# Patient Record
Sex: FEMALE
[De-identification: names, ages, dates, MRNs, and addresses within clinical notes are randomized per-mention and may not be internally consistent; named-entity substitution may affect disease eponyms.]

---

## 2017-01-23 ENCOUNTER — HOSPITAL (OUTPATIENT)
Dept: OTHER | Age: 72
End: 2017-01-23
Attending: INTERNAL MEDICINE

## 2017-01-23 ENCOUNTER — IMAGING SERVICES (OUTPATIENT)
Dept: OTHER | Age: 72
End: 2017-01-23

## 2017-01-23 LAB
ALBUMIN SERPL-MCNC: 3.8 GM/DL (ref 3.6–5.1)
ALBUMIN/GLOB SERPL: 1.2 {RATIO} (ref 1–2.4)
ALP SERPL-CCNC: 70 UNIT/L (ref 45–117)
ALT SERPL-CCNC: 32 UNIT/L
ANALYZER ANC (IANC): NORMAL
ANION GAP SERPL CALC-SCNC: 12 MMOL/L (ref 10–20)
AST SERPL-CCNC: 17 UNIT/L
BASOPHILS # BLD: 0 THOUSAND/MCL (ref 0–0.3)
BASOPHILS NFR BLD: 1 %
BILIRUB SERPL-MCNC: 0.3 MG/DL (ref 0.2–1)
BUN SERPL-MCNC: 18 MG/DL (ref 10–20)
BUN/CREAT SERPL: 34 (ref 7–25)
CALCIUM SERPL-MCNC: 9 MG/DL (ref 8.4–10.2)
CHLORIDE: 105 MMOL/L (ref 98–107)
CO2 SERPL-SCNC: 29 MMOL/L (ref 21–32)
CREAT SERPL-MCNC: 0.53 MG/DL (ref 0.51–0.95)
D DIMER PPP FEU-MCNC: <0.19 MG/L FEU
DIFFERENTIAL METHOD BLD: NORMAL
EOSINOPHIL # BLD: 0.1 THOUSAND/MCL (ref 0.1–0.5)
EOSINOPHIL NFR BLD: 1 %
ERYTHROCYTE [DISTWIDTH] IN BLOOD: 13.6 % (ref 11–15)
GLOBULIN SER-MCNC: 3.2 GM/DL (ref 2–4)
GLUCOSE SERPL-MCNC: 114 MG/DL (ref 65–99)
HEMATOCRIT: 37 % (ref 36–46.5)
HGB BLD-MCNC: 12.4 GM/DL (ref 12–15.5)
LYMPHOCYTES # BLD: 3.1 THOUSAND/MCL (ref 1–4)
LYMPHOCYTES NFR BLD: 49 %
MCH RBC QN AUTO: 30.4 PG (ref 26–34)
MCHC RBC AUTO-ENTMCNC: 33.5 GM/DL (ref 32–36.5)
MCV RBC AUTO: 90.7 FL (ref 78–100)
MONOCYTES # BLD: 0.3 THOUSAND/MCL (ref 0.3–0.9)
MONOCYTES NFR BLD: 4 %
NEUTROPHILS # BLD: 2.8 THOUSAND/MCL (ref 1.8–7.7)
NEUTROPHILS NFR BLD: 45 %
NEUTS SEG NFR BLD: NORMAL %
PERCENT NRBC: NORMAL
PLATELET # BLD: 213 THOUSAND/MCL (ref 140–450)
POTASSIUM SERPL-SCNC: 4.2 MMOL/L (ref 3.4–5.1)
PROT SERPL-MCNC: 7 GM/DL (ref 6.4–8.2)
RBC # BLD: 4.08 MILLION/MCL (ref 4–5.2)
SODIUM SERPL-SCNC: 142 MMOL/L (ref 135–145)
TROPONIN I SERPL HS-MCNC: <0.02 NG/ML
WBC # BLD: 6.2 THOUSAND/MCL (ref 4.2–11)

## 2017-01-24 ENCOUNTER — IMAGING SERVICES (OUTPATIENT)
Dept: OTHER | Age: 72
End: 2017-01-24

## 2017-01-24 LAB
ANALYZER ANC (IANC): NORMAL
ANION GAP SERPL CALC-SCNC: 15 MMOL/L (ref 10–20)
BASOPHILS # BLD: 0 THOUSAND/MCL (ref 0–0.3)
BASOPHILS NFR BLD: 1 %
BUN SERPL-MCNC: 14 MG/DL (ref 10–20)
BUN/CREAT SERPL: 26 (ref 7–25)
CALCIUM SERPL-MCNC: 8.4 MG/DL (ref 8.4–10.2)
CHLORIDE: 107 MMOL/L (ref 98–107)
CHOLEST SERPL-MCNC: 154 MG/DL
CHOLEST/HDLC SERPL: 2.4 {RATIO}
CO2 SERPL-SCNC: 26 MMOL/L (ref 21–32)
CREAT SERPL-MCNC: 0.53 MG/DL (ref 0.51–0.95)
DIFFERENTIAL METHOD BLD: NORMAL
EOSINOPHIL # BLD: 0.1 THOUSAND/MCL (ref 0.1–0.5)
EOSINOPHIL NFR BLD: 2 %
ERYTHROCYTE [DISTWIDTH] IN BLOOD: 13.8 % (ref 11–15)
GLUCOSE SERPL-MCNC: 87 MG/DL (ref 65–99)
HDLC SERPL-MCNC: 64 MG/DL
HEMATOCRIT: 37.2 % (ref 36–46.5)
HGB BLD-MCNC: 12.3 GM/DL (ref 12–15.5)
LDLC SERPL CALC-MCNC: 70 MG/DL
LYMPHOCYTES # BLD: 2.4 THOUSAND/MCL (ref 1–4)
LYMPHOCYTES NFR BLD: 47 %
MCH RBC QN AUTO: 30.1 PG (ref 26–34)
MCHC RBC AUTO-ENTMCNC: 33.1 GM/DL (ref 32–36.5)
MCV RBC AUTO: 91.2 FL (ref 78–100)
MONOCYTES # BLD: 0.3 THOUSAND/MCL (ref 0.3–0.9)
MONOCYTES NFR BLD: 7 %
NEUTROPHILS # BLD: 2.2 THOUSAND/MCL (ref 1.8–7.7)
NEUTROPHILS NFR BLD: 43 %
NEUTS SEG NFR BLD: NORMAL %
NONHDLC SERPL-MCNC: 90 MG/DL
PERCENT NRBC: NORMAL
PLATELET # BLD: 204 THOUSAND/MCL (ref 140–450)
POTASSIUM SERPL-SCNC: 3.7 MMOL/L (ref 3.4–5.1)
RBC # BLD: 4.08 MILLION/MCL (ref 4–5.2)
SODIUM SERPL-SCNC: 144 MMOL/L (ref 135–145)
T3FREE SERPL-MCNC: 2.1 PG/ML (ref 2.2–4)
T4 FREE SERPL-MCNC: 0.8 NG/DL (ref 0.8–1.5)
TRIGL SERPL-MCNC: 102 MG/DL
TROPONIN I SERPL HS-MCNC: <0.02 NG/ML
TROPONIN I SERPL HS-MCNC: <0.02 NG/ML
TSH SERPL-ACNC: 0.28 MCUNIT/ML (ref 0.35–5)
WBC # BLD: 5.1 THOUSAND/MCL (ref 4.2–11)

## 2018-10-29 ENCOUNTER — OFFICE VISIT (OUTPATIENT)
Dept: INTERNAL MEDICINE CLINIC | Facility: CLINIC | Age: 73
End: 2018-10-29
Payer: MEDICARE

## 2018-10-29 VITALS
HEIGHT: 59 IN | BODY MASS INDEX: 21.17 KG/M2 | SYSTOLIC BLOOD PRESSURE: 103 MMHG | RESPIRATION RATE: 12 BRPM | WEIGHT: 105 LBS | HEART RATE: 90 BPM | TEMPERATURE: 98 F | DIASTOLIC BLOOD PRESSURE: 58 MMHG | OXYGEN SATURATION: 96 %

## 2018-10-29 DIAGNOSIS — M79.7 FIBROMYALGIA: Primary | ICD-10-CM

## 2018-10-29 DIAGNOSIS — Z86.19 HISTORY OF LYME DISEASE: ICD-10-CM

## 2018-10-29 PROCEDURE — 99203 OFFICE O/P NEW LOW 30 MIN: CPT | Performed by: INTERNAL MEDICINE

## 2018-10-29 NOTE — PROGRESS NOTES
2mos ago, getting her life together. Hx fibromyalgia and Lyme. Requested records and will review-recent hosp [facility] 2018. No Rx all natural. Will bring in bottles next week. 3o min, new pt all discussion.

## 2018-11-01 ENCOUNTER — TELEPHONE (OUTPATIENT)
Dept: INTERNAL MEDICINE CLINIC | Facility: CLINIC | Age: 73
End: 2018-11-01

## 2018-11-01 DIAGNOSIS — A69.20 LYME DISEASE: Primary | ICD-10-CM

## 2018-11-01 NOTE — TELEPHONE ENCOUNTER
Patient called and has some questions for the nurse regarding how the \"Dr treats certain situations\".  Didn't specify what exactly but would just like to speak to a nurse

## 2018-11-02 NOTE — TELEPHONE ENCOUNTER
Pc to pt    Confirmed case of Lymes disease in 2012  And was followed by Dr. Clau Quintana MD moved and pt has not been followed for Lymes disease in 2 yrs.      In addition, Pt also states she feels like she has mold symptoms    Pt reports overall body pain  a

## 2018-11-02 NOTE — TELEPHONE ENCOUNTER
She has fu 11/7 for her questions, just getting to know her, I did advise she take melatonin 10mg hs and she would be be bringing her supplements in to discuss 11/7, ok to get Lyme blood test tomorrow at [facility] lab so we will have 11/7.

## 2018-11-09 ENCOUNTER — APPOINTMENT (OUTPATIENT)
Dept: LAB | Age: 73
End: 2018-11-09
Attending: INTERNAL MEDICINE
Payer: MEDICARE

## 2018-11-09 ENCOUNTER — OFFICE VISIT (OUTPATIENT)
Dept: INTERNAL MEDICINE CLINIC | Facility: CLINIC | Age: 73
End: 2018-11-09
Payer: MEDICARE

## 2018-11-09 VITALS
SYSTOLIC BLOOD PRESSURE: 116 MMHG | RESPIRATION RATE: 16 BRPM | DIASTOLIC BLOOD PRESSURE: 70 MMHG | BODY MASS INDEX: 21.37 KG/M2 | HEIGHT: 59 IN | HEART RATE: 68 BPM | TEMPERATURE: 98 F | WEIGHT: 106 LBS

## 2018-11-09 DIAGNOSIS — M79.7 FIBROMYALGIA: Primary | ICD-10-CM

## 2018-11-09 DIAGNOSIS — M06.9 RHEUMATOID ARTHRITIS, INVOLVING UNSPECIFIED SITE, UNSPECIFIED RHEUMATOID FACTOR PRESENCE: ICD-10-CM

## 2018-11-09 DIAGNOSIS — M79.7 FIBROMYALGIA: ICD-10-CM

## 2018-11-09 DIAGNOSIS — Z86.19 HISTORY OF LYME DISEASE: ICD-10-CM

## 2018-11-09 DIAGNOSIS — A69.20 LYME DISEASE: ICD-10-CM

## 2018-11-09 DIAGNOSIS — Z77.120 MOLD EXPOSURE: ICD-10-CM

## 2018-11-09 PROCEDURE — 87476 LYME DIS DNA AMP PROBE: CPT

## 2018-11-09 PROCEDURE — 85652 RBC SED RATE AUTOMATED: CPT

## 2018-11-09 PROCEDURE — 36415 COLL VENOUS BLD VENIPUNCTURE: CPT

## 2018-11-09 PROCEDURE — 99214 OFFICE O/P EST MOD 30 MIN: CPT | Performed by: INTERNAL MEDICINE

## 2018-11-09 PROCEDURE — 86038 ANTINUCLEAR ANTIBODIES: CPT

## 2018-11-09 PROCEDURE — 86431 RHEUMATOID FACTOR QUANT: CPT

## 2018-11-09 NOTE — PROGRESS NOTES
HPI:    Patient ID: Sabrina Germain is a 74year old female new pt just learning about previous dx Lyme, Mold, fibromyalgia. Took down list of her many supplements. Fibromyalgia type pains shoulders, spine, hips. Getting old records.  Order labs and ref lyme disease  Mold exposure  Labs for Lyme, RF, DIMITRIOS, ESR  Refer John for allergy mold  Refer Geraldine Nunez for Lyme  May need rheum  RTC 1 mo  Took list of her supplements, wants no Rx and no vaccines (flushot) at this time       Orders Placed This Encounter

## 2018-12-07 ENCOUNTER — MED REC SCAN ONLY (OUTPATIENT)
Dept: INTERNAL MEDICINE CLINIC | Facility: CLINIC | Age: 73
End: 2018-12-07

## 2019-01-16 ENCOUNTER — PATIENT MESSAGE (OUTPATIENT)
Dept: INTERNAL MEDICINE CLINIC | Facility: CLINIC | Age: 74
End: 2019-01-16

## 2019-01-17 NOTE — TELEPHONE ENCOUNTER
From: Trang Dugan  To: Martha Carranza MD  Sent: 1/16/2019 5:21 PM CST  Subject: Other    Please change my name as it appears on my Medicare Health Insurance card:   [address] [place] [address] #[id number]. Medical part B 01/01/2008    BXBS. Trang Dugan.  I

## 2019-01-21 ENCOUNTER — OFFICE VISIT (OUTPATIENT)
Dept: INTERNAL MEDICINE CLINIC | Facility: CLINIC | Age: 74
End: 2019-01-21
Payer: MEDICARE

## 2019-01-21 ENCOUNTER — TELEPHONE (OUTPATIENT)
Dept: INTERNAL MEDICINE CLINIC | Facility: CLINIC | Age: 74
End: 2019-01-21

## 2019-01-21 ENCOUNTER — LAB ENCOUNTER (OUTPATIENT)
Dept: LAB | Age: 74
End: 2019-01-21
Attending: INTERNAL MEDICINE
Payer: MEDICARE

## 2019-01-21 VITALS
OXYGEN SATURATION: 97 % | HEART RATE: 61 BPM | DIASTOLIC BLOOD PRESSURE: 74 MMHG | BODY MASS INDEX: 20.56 KG/M2 | WEIGHT: 102 LBS | SYSTOLIC BLOOD PRESSURE: 120 MMHG | HEIGHT: 59 IN

## 2019-01-21 DIAGNOSIS — M79.7 FIBROMYALGIA: ICD-10-CM

## 2019-01-21 DIAGNOSIS — M79.7 FIBROMYALGIA: Primary | ICD-10-CM

## 2019-01-21 DIAGNOSIS — F32.9 REACTIVE DEPRESSION: ICD-10-CM

## 2019-01-21 DIAGNOSIS — R30.0 DYSURIA: ICD-10-CM

## 2019-01-21 DIAGNOSIS — D64.9 ANEMIA, UNSPECIFIED TYPE: ICD-10-CM

## 2019-01-21 LAB
APPEARANCE: CLEAR
BASOPHILS # BLD AUTO: 0.07 X10(3) UL (ref 0–0.1)
BASOPHILS NFR BLD AUTO: 0.8 %
BILIRUBIN: NEGATIVE
EOSINOPHIL # BLD AUTO: 0.07 X10(3) UL (ref 0–0.3)
EOSINOPHIL NFR BLD AUTO: 0.8 %
ERYTHROCYTE [DISTWIDTH] IN BLOOD BY AUTOMATED COUNT: 14.3 % (ref 11.5–16)
GLUCOSE (URINE DIPSTICK): NEGATIVE MG/DL
HCT VFR BLD AUTO: 39.8 % (ref 34–50)
HGB BLD-MCNC: 13.2 G/DL (ref 12–16)
IMMATURE GRANULOCYTE COUNT: 0.01 X10(3) UL (ref 0–1)
IMMATURE GRANULOCYTE RATIO %: 0.1 %
KETONES (URINE DIPSTICK): NEGATIVE MG/DL
LEUKOCYTES: NEGATIVE
LYMPHOCYTES # BLD AUTO: 4.04 X10(3) UL (ref 0.9–4)
LYMPHOCYTES NFR BLD AUTO: 45.9 %
MCH RBC QN AUTO: 30.4 PG (ref 27–33.2)
MCHC RBC AUTO-ENTMCNC: 33.2 G/DL (ref 31–37)
MCV RBC AUTO: 91.7 FL (ref 81–100)
MONOCYTES # BLD AUTO: 0.46 X10(3) UL (ref 0.1–1)
MONOCYTES NFR BLD AUTO: 5.2 %
MULTISTIX LOT#: NORMAL NUMERIC
NEUTROPHIL ABS PRELIM: 4.15 X10 (3) UL (ref 1.3–6.7)
NEUTROPHILS # BLD AUTO: 4.15 X10(3) UL (ref 1.3–6.7)
NEUTROPHILS NFR BLD AUTO: 47.2 %
NITRITE, URINE: NEGATIVE
PH, URINE: 7 (ref 4.5–8)
PLATELET # BLD AUTO: 245 10(3)UL (ref 150–450)
PROTEIN (URINE DIPSTICK): NEGATIVE MG/DL
RBC # BLD AUTO: 4.34 X10(6)UL (ref 3.8–5.1)
RED CELL DISTRIBUTION WIDTH-SD: 48.2 FL (ref 35.1–46.3)
SPECIFIC GRAVITY: 1.01 (ref 1–1.03)
URINE-COLOR: COLORLESS
UROBILINOGEN,SEMI-QN: 0.2 MG/DL (ref 0–1.9)
WBC # BLD AUTO: 8.8 X10(3) UL (ref 4–13)

## 2019-01-21 PROCEDURE — 81003 URINALYSIS AUTO W/O SCOPE: CPT | Performed by: INTERNAL MEDICINE

## 2019-01-21 PROCEDURE — 99212 OFFICE O/P EST SF 10 MIN: CPT | Performed by: INTERNAL MEDICINE

## 2019-01-21 PROCEDURE — 85025 COMPLETE CBC W/AUTO DIFF WBC: CPT

## 2019-01-21 PROCEDURE — 36415 COLL VENOUS BLD VENIPUNCTURE: CPT

## 2019-01-21 RX ORDER — UBIDECARENONE 75 MG
250 CAPSULE ORAL DAILY
COMMUNITY
End: 2020-12-30

## 2019-01-21 NOTE — TELEPHONE ENCOUNTER
----- Message from Garett Stephens MD sent at 1/21/2019  3:49 PM CST -----  Normal-ignore the minor variations

## 2019-01-21 NOTE — TELEPHONE ENCOUNTER
----- Message from Karenann Kawasaki, MD sent at 1/21/2019  3:49 PM CST -----  Normal-ignore the minor variations

## 2019-02-12 PROCEDURE — 84165 PROTEIN E-PHORESIS SERUM: CPT | Performed by: INTERNAL MEDICINE

## 2019-02-12 PROCEDURE — 86200 CCP ANTIBODY: CPT | Performed by: INTERNAL MEDICINE

## 2019-02-12 PROCEDURE — 83883 ASSAY NEPHELOMETRY NOT SPEC: CPT | Performed by: INTERNAL MEDICINE

## 2019-02-12 PROCEDURE — 86334 IMMUNOFIX E-PHORESIS SERUM: CPT | Performed by: INTERNAL MEDICINE

## 2019-02-12 PROCEDURE — 86431 RHEUMATOID FACTOR QUANT: CPT | Performed by: INTERNAL MEDICINE

## 2019-02-12 PROCEDURE — 86160 COMPLEMENT ANTIGEN: CPT | Performed by: INTERNAL MEDICINE

## 2019-02-22 ENCOUNTER — TELEPHONE (OUTPATIENT)
Dept: INTERNAL MEDICINE CLINIC | Facility: CLINIC | Age: 74
End: 2019-02-22

## 2019-02-22 NOTE — TELEPHONE ENCOUNTER
Patient called 1/31/19 to confirm she would be leaving the practice due to financial reasons. She had previously told the doctor of her plans.

## 2019-02-28 PROCEDURE — 86618 LYME DISEASE ANTIBODY: CPT | Performed by: INTERNAL MEDICINE

## 2019-03-22 ENCOUNTER — EXTERNAL RECORD (OUTPATIENT)
Dept: OTHER | Age: 74
End: 2019-03-22

## 2019-03-25 PROCEDURE — 82784 ASSAY IGA/IGD/IGG/IGM EACH: CPT | Performed by: PHYSICIAN ASSISTANT

## 2019-03-25 PROCEDURE — 86256 FLUORESCENT ANTIBODY TITER: CPT | Performed by: PHYSICIAN ASSISTANT

## 2019-05-03 PROBLEM — M81.0 AGE-RELATED OSTEOPOROSIS WITHOUT CURRENT PATHOLOGICAL FRACTURE: Status: ACTIVE | Noted: 2019-05-03

## 2019-05-03 PROBLEM — M54.50 CHRONIC BILATERAL LOW BACK PAIN WITHOUT SCIATICA: Status: ACTIVE | Noted: 2019-05-03

## 2019-05-03 PROBLEM — G89.29 CHRONIC BILATERAL LOW BACK PAIN WITHOUT SCIATICA: Status: ACTIVE | Noted: 2019-05-03

## 2019-05-03 PROBLEM — M81.8 OTHER OSTEOPOROSIS WITHOUT CURRENT PATHOLOGICAL FRACTURE: Status: ACTIVE | Noted: 2019-05-03

## 2020-03-20 ENCOUNTER — HOSPITAL ENCOUNTER (EMERGENCY)
Facility: HOSPITAL | Age: 75
Discharge: HOME | End: 2020-03-20
Attending: EMERGENCY MEDICINE
Payer: MEDICARE

## 2020-03-20 ENCOUNTER — APPOINTMENT (OUTPATIENT)
Dept: GENERAL RADIOLOGY | Facility: HOSPITAL | Age: 75
End: 2020-03-20
Attending: EMERGENCY MEDICINE
Payer: MEDICARE

## 2020-03-20 VITALS
OXYGEN SATURATION: 97 % | DIASTOLIC BLOOD PRESSURE: 71 MMHG | TEMPERATURE: 98 F | BODY MASS INDEX: 21 KG/M2 | RESPIRATION RATE: 15 BRPM | HEART RATE: 65 BPM | WEIGHT: 103.63 LBS | SYSTOLIC BLOOD PRESSURE: 115 MMHG

## 2020-03-20 DIAGNOSIS — R07.9 CHEST PAIN OF UNCERTAIN ETIOLOGY: Primary | ICD-10-CM

## 2020-03-20 LAB
ALBUMIN SERPL-MCNC: 3.6 G/DL (ref 3.4–5)
ALBUMIN/GLOB SERPL: 1.1 {RATIO} (ref 1–2)
ALP LIVER SERPL-CCNC: 63 U/L (ref 55–142)
ALT SERPL-CCNC: 21 U/L (ref 13–56)
ANION GAP SERPL CALC-SCNC: 5 MMOL/L (ref 0–18)
AST SERPL-CCNC: 15 U/L (ref 15–37)
BASOPHILS # BLD AUTO: 0.05 X10(3) UL (ref 0–0.2)
BASOPHILS NFR BLD AUTO: 0.9 %
BILIRUB SERPL-MCNC: 0.6 MG/DL (ref 0.1–2)
BUN BLD-MCNC: 23 MG/DL (ref 7–18)
BUN/CREAT SERPL: 42.6 (ref 10–20)
CALCIUM BLD-MCNC: 8.6 MG/DL (ref 8.5–10.1)
CHLORIDE SERPL-SCNC: 109 MMOL/L (ref 98–112)
CO2 SERPL-SCNC: 26 MMOL/L (ref 21–32)
CREAT BLD-MCNC: 0.54 MG/DL (ref 0.55–1.02)
D-DIMER: <0.27 UG/ML FEU (ref ?–0.77)
DEPRECATED RDW RBC AUTO: 46.9 FL (ref 35.1–46.3)
EOSINOPHIL # BLD AUTO: 0.06 X10(3) UL (ref 0–0.7)
EOSINOPHIL NFR BLD AUTO: 1 %
ERYTHROCYTE [DISTWIDTH] IN BLOOD BY AUTOMATED COUNT: 13.8 % (ref 11–15)
GLOBULIN PLAS-MCNC: 3.3 G/DL (ref 2.8–4.4)
GLUCOSE BLD-MCNC: 96 MG/DL (ref 70–99)
HCT VFR BLD AUTO: 37.9 % (ref 35–48)
HGB BLD-MCNC: 12.4 G/DL (ref 12–16)
IMM GRANULOCYTES # BLD AUTO: 0.01 X10(3) UL (ref 0–1)
IMM GRANULOCYTES NFR BLD: 0.2 %
LYMPHOCYTES # BLD AUTO: 2.21 X10(3) UL (ref 1–4)
LYMPHOCYTES NFR BLD AUTO: 37.8 %
M PROTEIN MFR SERPL ELPH: 6.9 G/DL (ref 6.4–8.2)
MCH RBC QN AUTO: 30.7 PG (ref 26–34)
MCHC RBC AUTO-ENTMCNC: 32.7 G/DL (ref 31–37)
MCV RBC AUTO: 93.8 FL (ref 80–100)
MONOCYTES # BLD AUTO: 0.27 X10(3) UL (ref 0.1–1)
MONOCYTES NFR BLD AUTO: 4.6 %
NEUTROPHILS # BLD AUTO: 3.25 X10 (3) UL (ref 1.5–7.7)
NEUTROPHILS # BLD AUTO: 3.25 X10(3) UL (ref 1.5–7.7)
NEUTROPHILS NFR BLD AUTO: 55.5 %
OSMOLALITY SERPL CALC.SUM OF ELEC: 294 MOSM/KG (ref 275–295)
PLATELET # BLD AUTO: 226 10(3)UL (ref 150–450)
POTASSIUM SERPL-SCNC: 3.7 MMOL/L (ref 3.5–5.1)
RBC # BLD AUTO: 4.04 X10(6)UL (ref 3.8–5.3)
SODIUM SERPL-SCNC: 140 MMOL/L (ref 136–145)
TROPONIN I SERPL-MCNC: <0.045 NG/ML (ref ?–0.04)
TROPONIN I SERPL-MCNC: <0.045 NG/ML (ref ?–0.04)
WBC # BLD AUTO: 5.9 X10(3) UL (ref 4–11)

## 2020-03-20 PROCEDURE — 85025 COMPLETE CBC W/AUTO DIFF WBC: CPT | Performed by: EMERGENCY MEDICINE

## 2020-03-20 PROCEDURE — 85379 FIBRIN DEGRADATION QUANT: CPT | Performed by: EMERGENCY MEDICINE

## 2020-03-20 PROCEDURE — 71045 X-RAY EXAM CHEST 1 VIEW: CPT | Performed by: EMERGENCY MEDICINE

## 2020-03-20 PROCEDURE — 93010 ELECTROCARDIOGRAM REPORT: CPT

## 2020-03-20 PROCEDURE — 93005 ELECTROCARDIOGRAM TRACING: CPT

## 2020-03-20 PROCEDURE — 99285 EMERGENCY DEPT VISIT HI MDM: CPT

## 2020-03-20 PROCEDURE — 36415 COLL VENOUS BLD VENIPUNCTURE: CPT

## 2020-03-20 PROCEDURE — 80053 COMPREHEN METABOLIC PANEL: CPT | Performed by: EMERGENCY MEDICINE

## 2020-03-20 PROCEDURE — 99284 EMERGENCY DEPT VISIT MOD MDM: CPT

## 2020-03-20 PROCEDURE — 84484 ASSAY OF TROPONIN QUANT: CPT | Performed by: EMERGENCY MEDICINE

## 2020-03-20 RX ORDER — IBUPROFEN 600 MG/1
600 TABLET ORAL ONCE
Status: DISCONTINUED | OUTPATIENT
Start: 2020-03-20 | End: 2020-03-20

## 2020-03-20 RX ORDER — ASPIRIN 81 MG/1
81 TABLET, CHEWABLE ORAL ONCE
Status: COMPLETED | OUTPATIENT
Start: 2020-03-20 | End: 2020-03-20

## 2020-03-20 NOTE — ED INITIAL ASSESSMENT (HPI)
Per patient, sharp chest pain on the right side without shortness of breath since last night. AAO x 4.

## 2020-03-20 NOTE — ED PROVIDER NOTES
Patient Seen in: [facility] Emergency Department      History   Patient presents with:  Chest Pain Angina    Stated Complaint: Chest pain    HPI    This is a 47-year-old female who arrives here with complaints of a sharp pain to the right side of he 16[address] None (Room air)       Current:/59   Pulse 70   Temp 97.9 °F (36.6 °C) (Temporal)   Resp 19   Wt 47 kg   SpO2 96%   BMI 20.93 kg/m²         Physical Exam  General: Patient is in no respiratory distress. Thin female.   The patient is in no respira on the individual orders. TROPONIN I   RAINBOW DRAW BLUE   RAINBOW DRAW LAVENDER   RAINBOW DRAW LIGHT GREEN   RAINBOW DRAW GOLD                 The EKG shows normal sinus rhythm. There is no acute ST elevations or ischemic findings.   The rest of the EKG discussed if she has any recurrent chest pain to return here to take an aspirin daily. The pain is completely reproducible on her right side of her chest wall. The EKG shows normal sinus rhythm. There is no acute ST elevations or ischemic findings.   Yani Zamora

## 2020-03-21 ENCOUNTER — HOSPITAL ENCOUNTER (EMERGENCY)
Facility: HOSPITAL | Age: 75
Discharge: LEFT BEFORE DISCHARGE | End: 2020-03-21
Attending: EMERGENCY MEDICINE
Payer: MEDICARE

## 2020-03-21 VITALS
OXYGEN SATURATION: 98 % | HEIGHT: 59 IN | BODY MASS INDEX: 20.96 KG/M2 | HEART RATE: 72 BPM | SYSTOLIC BLOOD PRESSURE: 135 MMHG | DIASTOLIC BLOOD PRESSURE: 66 MMHG | RESPIRATION RATE: 17 BRPM | TEMPERATURE: 99 F | WEIGHT: 104 LBS

## 2020-03-21 DIAGNOSIS — R07.9 ACUTE CHEST PAIN: Primary | ICD-10-CM

## 2020-03-21 LAB
ATRIAL RATE: 64 BPM
P AXIS: 43 DEGREES
P-R INTERVAL: 142 MS
Q-T INTERVAL: 420 MS
QRS DURATION: 82 MS
QTC CALCULATION (BEZET): 433 MS
R AXIS: 35 DEGREES
T AXIS: 65 DEGREES
TROPONIN I SERPL-MCNC: <0.045 NG/ML (ref ?–0.04)
VENTRICULAR RATE: 64 BPM

## 2020-03-21 PROCEDURE — 99285 EMERGENCY DEPT VISIT HI MDM: CPT

## 2020-03-21 PROCEDURE — 99284 EMERGENCY DEPT VISIT MOD MDM: CPT

## 2020-03-21 PROCEDURE — 93010 ELECTROCARDIOGRAM REPORT: CPT

## 2020-03-21 PROCEDURE — 84484 ASSAY OF TROPONIN QUANT: CPT | Performed by: EMERGENCY MEDICINE

## 2020-03-21 PROCEDURE — 36415 COLL VENOUS BLD VENIPUNCTURE: CPT

## 2020-03-21 PROCEDURE — 93005 ELECTROCARDIOGRAM TRACING: CPT

## 2020-03-21 NOTE — ED PROVIDER NOTES
Patient Seen in: [facility] Emergency Department      History   Patient presents with:  Chest Pain Angina    Stated Complaint: CP    HPI    This is a 42-year-old female with past medical history of fibromyalgia mild's disease, history of Lyme diseas All other systems reviewed and negative except as noted above.     Physical Exam     ED Triage Vitals [03/21/20 1907]   /66   Pulse 72   Resp 17   Temp 98.6 °F (37 °C)   Temp src Temporal   SpO2 98 %   O2 Device        Current:/66   Pulse 72 Repeat troponin was negative.         Disposition and Plan     Clinical Impression:  Acute chest pain  (primary encounter diagnosis)    Disposition:  Eloped  3/21/2020  6:38 pm    Follow-up:  Bruce Sepulveda MD  [address] [address]

## 2020-03-21 NOTE — ED INITIAL ASSESSMENT (HPI)
Pt presented to ED from home c/o chest pain, shortness of breath and neck pain states it doesn't radiate from chest to neck NSR on monitor O2 sat 100% RA.  Pt was here in ED with same complaint
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
## 2020-03-22 LAB
ATRIAL RATE: 69 BPM
ATRIAL RATE: 73 BPM
P AXIS: 67 DEGREES
P AXIS: 72 DEGREES
P-R INTERVAL: 148 MS
P-R INTERVAL: 152 MS
Q-T INTERVAL: 394 MS
Q-T INTERVAL: 398 MS
QRS DURATION: 74 MS
QRS DURATION: 80 MS
QTC CALCULATION (BEZET): 426 MS
QTC CALCULATION (BEZET): 434 MS
R AXIS: 46 DEGREES
R AXIS: 46 DEGREES
T AXIS: 67 DEGREES
T AXIS: 71 DEGREES
VENTRICULAR RATE: 69 BPM
VENTRICULAR RATE: 73 BPM

## 2020-12-03 PROBLEM — G31.84 MCI (MILD COGNITIVE IMPAIRMENT): Status: ACTIVE | Noted: 2020-12-03

## 2020-12-03 PROBLEM — M79.2 NEURALGIA INVOLVING SCALP: Status: ACTIVE | Noted: 2020-12-03

## 2021-01-22 ENCOUNTER — TELEPHONE (OUTPATIENT)
Dept: INTERNAL MEDICINE CLINIC | Facility: CLINIC | Age: 76
End: 2021-01-22

## 2021-01-22 NOTE — TELEPHONE ENCOUNTER
Patient looking to establish with Dr. Monse Rutherford nothing scheduled. Patient states having severe back pain, SOB, and difficulty walking. Patient denies injuries or fevers. Patient advised to be seen in the ER given symptomps.  Patient advised would need to f

## 2021-02-08 NOTE — TELEPHONE ENCOUNTER
Patient states worsening SOB, decreased appetite, nausea over the last 5 days. Patient was seen in ER at [facility] in [address] [place] [address],[address]. Patient states last ER visit 1/28/2021 and states she was told symptoms were related to GERD.  Patient advised of ER warning signs

## 2021-02-08 NOTE — TELEPHONE ENCOUNTER
Pt called back and stated that she is still have the same SOB and Back pain but now has GERD that is making it hard to eat anything     Pt was advised per Mirna RN to contact previous PCP since pt has not est care in this office.      Please advise

## 2021-02-09 NOTE — TELEPHONE ENCOUNTER
Future Appointments   Date Time Provider Alexa Ela   2/12/2021  8:40 AM Vandana Mabry MD [facility]

## 2021-02-10 ENCOUNTER — TELEMEDICINE (OUTPATIENT)
Dept: INTERNAL MEDICINE CLINIC | Facility: CLINIC | Age: 76
End: 2021-02-10
Payer: MEDICARE

## 2021-02-10 DIAGNOSIS — R10.13 DYSPEPSIA: ICD-10-CM

## 2021-02-10 DIAGNOSIS — E03.8 SUBCLINICAL HYPOTHYROIDISM: Primary | ICD-10-CM

## 2021-02-10 DIAGNOSIS — R06.02 SHORTNESS OF BREATH: ICD-10-CM

## 2021-02-10 DIAGNOSIS — K59.09 CHRONIC CONSTIPATION: ICD-10-CM

## 2021-02-10 PROCEDURE — 99214 OFFICE O/P EST MOD 30 MIN: CPT | Performed by: INTERNAL MEDICINE

## 2021-02-10 NOTE — TELEPHONE ENCOUNTER
Patient seen virtually by another IM service yesterday. Please emphasize UC/ED warnings given current symptoms. Also needs to schedule with GI service. Okay for office visit.

## 2021-02-10 NOTE — TELEPHONE ENCOUNTER
Patient contacted to discuss appointment 2/12 with AD in office, rescheduled to virtual after discussing virtual encounter yesterday with another IM office.  Patient had concerns for COVID and flu along with indicating symptoms worsening over the last 4 day
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
## 2021-02-12 ENCOUNTER — TELEPHONE (OUTPATIENT)
Dept: INTERNAL MEDICINE CLINIC | Facility: CLINIC | Age: 76
End: 2021-02-12

## 2021-02-18 LAB
T4, FREE: 1 NG/DL (ref 0.8–1.8)
TSH W/REFLEX TO FT4: 10.1 MIU/L (ref 0.4–4.5)

## 2021-03-03 ENCOUNTER — HOSPITAL ENCOUNTER (EMERGENCY)
Facility: HOSPITAL | Age: 76
Discharge: HOME | End: 2021-03-03
Attending: EMERGENCY MEDICINE
Payer: MEDICARE

## 2021-03-03 ENCOUNTER — APPOINTMENT (OUTPATIENT)
Dept: GENERAL RADIOLOGY | Facility: HOSPITAL | Age: 76
End: 2021-03-03
Attending: EMERGENCY MEDICINE
Payer: MEDICARE

## 2021-03-03 VITALS
HEART RATE: 68 BPM | DIASTOLIC BLOOD PRESSURE: 63 MMHG | BODY MASS INDEX: 22.64 KG/M2 | SYSTOLIC BLOOD PRESSURE: 112 MMHG | RESPIRATION RATE: 16 BRPM | OXYGEN SATURATION: 99 % | WEIGHT: 104.94 LBS | TEMPERATURE: 99 F | HEIGHT: 57 IN

## 2021-03-03 DIAGNOSIS — S76.012A HIP STRAIN, LEFT, INITIAL ENCOUNTER: Primary | ICD-10-CM

## 2021-03-03 PROCEDURE — 73502 X-RAY EXAM HIP UNI 2-3 VIEWS: CPT | Performed by: EMERGENCY MEDICINE

## 2021-03-03 PROCEDURE — 99283 EMERGENCY DEPT VISIT LOW MDM: CPT

## 2021-03-03 RX ORDER — ONDANSETRON 4 MG/1
4 TABLET, ORALLY DISINTEGRATING ORAL ONCE
Status: COMPLETED | OUTPATIENT
Start: 2021-03-03 | End: 2021-03-03

## 2021-03-04 ENCOUNTER — TELEPHONE (OUTPATIENT)
Dept: INTERNAL MEDICINE CLINIC | Facility: CLINIC | Age: 76
End: 2021-03-04

## 2021-03-04 NOTE — ED NOTES
Patient reported she cannot find her wallet. RN looked through linens on bed, around room, and xray department patient transferred to and it was not located. Patient brought to public safety for further assistance.

## 2021-03-04 NOTE — ED NOTES
Patient is able to ambulate with use of walker, reports pain is less in her left hip. Patient ambulated to bathroom safely.

## 2021-03-04 NOTE — ED INITIAL ASSESSMENT (HPI)
26-year-old female reports difficult standing on her left hip due to pain, with back pain, and nausea. Has a hx of osteopetrosis. Took a walk two days ago and has not been able to since. Denies any falls. A&Ox4.

## 2021-03-04 NOTE — ED PROVIDER NOTES
Patient Seen in: [facility] Emergency Department      History   Patient presents with:  Leg Pain    Stated Complaint: reports left leg pain, sob, nausea after a two mile walk    HPI/Subjective:   HPI    73-year-old female presents to the emergency d ecchymosis or crepitations. Full range of motion of both hips and distal extremities are neurovascularly intact with full range of motion. Dorsal pedal pulses are present. Capillary refill to the digits is brisk. Skin: No rashes or lesions.   Neuro exam [place]    Call in 1 day            Medications Prescribed:  Current Discharge Medication List

## 2021-03-04 NOTE — TELEPHONE ENCOUNTER
Pt went to ER yesterday for hip pain, constant nausea, headache, fatigue. Wants to follow-up with Dr. Tessa Fontenot. She didn't have covid test done while in ER so it can't be ruled out. Now pt has drainage in the back of throat and sneezing.  Would like to know wh

## 2021-03-05 NOTE — TELEPHONE ENCOUNTER
Patient scheduled for Virtual appt with AD for 3/11/21 40 minutes ER f/u hip pain and COVid testing. Pt verbalizes understanding.

## 2021-03-05 NOTE — TELEPHONE ENCOUNTER
Seen in ED secondary to hip pain attributed to overuse. There was no mention of sick symptoms. Agree with covid test and possible further management if not feeling well; please schedule virtual encounter to discuss (and obtain covid test).

## 2021-03-11 ENCOUNTER — TELEPHONE (OUTPATIENT)
Dept: INTERNAL MEDICINE CLINIC | Facility: CLINIC | Age: 76
End: 2021-03-11

## 2021-03-11 ENCOUNTER — TELEMEDICINE (OUTPATIENT)
Dept: INTERNAL MEDICINE CLINIC | Facility: CLINIC | Age: 76
End: 2021-03-11
Payer: MEDICARE

## 2021-03-11 DIAGNOSIS — M25.552 ACUTE PAIN OF LEFT HIP: Primary | ICD-10-CM

## 2021-03-11 DIAGNOSIS — M79.7 FIBROMYALGIA: ICD-10-CM

## 2021-03-11 DIAGNOSIS — E03.8 SUBCLINICAL HYPOTHYROIDISM: Primary | ICD-10-CM

## 2021-03-11 DIAGNOSIS — E03.8 SUBCLINICAL HYPOTHYROIDISM: ICD-10-CM

## 2021-03-11 DIAGNOSIS — M25.50 POLYARTHRALGIA: ICD-10-CM

## 2021-03-11 PROCEDURE — 99214 OFFICE O/P EST MOD 30 MIN: CPT | Performed by: INTERNAL MEDICINE

## 2021-03-11 RX ORDER — LEVOTHYROXINE SODIUM 0.03 MG/1
25 TABLET ORAL
Qty: 90 TABLET | Refills: 0 | Status: SHIPPED | OUTPATIENT
Start: 2021-03-11 | End: 2021-06-18

## 2021-03-11 NOTE — TELEPHONE ENCOUNTER
Patient had a TE visit with AD today, however she forgot to tell him that she does want to do the Thyroid medication and to please send to her [facility].

## 2021-03-11 NOTE — PROGRESS NOTES
Lencho Colin  1/25/1945    No chief complaint on file. Video Encounter    Rekha Galarza is a 64year old female who presents as an ED follow-up.     The patient was in her usual state of health until approximately 3/3 when she presented HOMEOPATHIC PRODUCTS EX Apply topically.  Essential oils           Carbamazepine           OTHER (SEE COMMENTS), SWELLING    Comment:Facial swelling  Clonazepam              OTHER (SEE COMMENTS)    Comment:Hallucinations             Hallucination Social History    Tobacco Use      Smoking status: Former Smoker        Types: Cigarettes        Quit date: 1971        Years since quittin.2      Smokeless tobacco: Never Used      Tobacco comment: smoked 2 years while she was in the college la

## 2021-03-12 NOTE — TELEPHONE ENCOUNTER
Patient notified to take medication first thing in the morning 30 min-1 hour prior to breakfast with full glass of water. Patient will repeat testing in 4 weeks [facility] by [facility]. Patient aware medication sent to pharmacy.

## 2021-05-19 ENCOUNTER — OFFICE VISIT (OUTPATIENT)
Dept: INTERNAL MEDICINE CLINIC | Facility: CLINIC | Age: 76
End: 2021-05-19
Payer: MEDICARE

## 2021-05-19 VITALS
TEMPERATURE: 98 F | SYSTOLIC BLOOD PRESSURE: 104 MMHG | HEART RATE: 68 BPM | WEIGHT: 102 LBS | DIASTOLIC BLOOD PRESSURE: 64 MMHG | BODY MASS INDEX: 22.01 KG/M2 | HEIGHT: 57 IN

## 2021-05-19 DIAGNOSIS — K59.09 CHRONIC CONSTIPATION: ICD-10-CM

## 2021-05-19 DIAGNOSIS — E03.9 ACQUIRED HYPOTHYROIDISM: ICD-10-CM

## 2021-05-19 DIAGNOSIS — M81.0 AGE-RELATED OSTEOPOROSIS WITHOUT CURRENT PATHOLOGICAL FRACTURE: ICD-10-CM

## 2021-05-19 DIAGNOSIS — R53.1 GENERALIZED WEAKNESS: ICD-10-CM

## 2021-05-19 DIAGNOSIS — G31.84 MCI (MILD COGNITIVE IMPAIRMENT): ICD-10-CM

## 2021-05-19 DIAGNOSIS — M79.7 FIBROMYALGIA: ICD-10-CM

## 2021-05-19 DIAGNOSIS — Z00.00 ENCOUNTER FOR ANNUAL HEALTH EXAMINATION: Primary | ICD-10-CM

## 2021-05-19 PROCEDURE — 99397 PER PM REEVAL EST PAT 65+ YR: CPT | Performed by: INTERNAL MEDICINE

## 2021-05-19 PROCEDURE — G0439 PPPS, SUBSEQ VISIT: HCPCS | Performed by: INTERNAL MEDICINE

## 2021-05-19 PROCEDURE — 3008F BODY MASS INDEX DOCD: CPT | Performed by: INTERNAL MEDICINE

## 2021-05-19 PROCEDURE — 96160 PT-FOCUSED HLTH RISK ASSMT: CPT | Performed by: INTERNAL MEDICINE

## 2021-05-19 PROCEDURE — 3078F DIAST BP <80 MM HG: CPT | Performed by: INTERNAL MEDICINE

## 2021-05-19 PROCEDURE — 3074F SYST BP LT 130 MM HG: CPT | Performed by: INTERNAL MEDICINE

## 2021-05-19 RX ORDER — OMEGA-3/DHA/EPA/FISH OIL 500-1000MG
1 CAPSULE ORAL DAILY
COMMUNITY

## 2021-05-19 RX ORDER — RIBOFLAVIN (VITAMIN B2) 100 MG
100 TABLET ORAL DAILY
COMMUNITY
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
## 2021-05-19 NOTE — PATIENT INSTRUCTIONS
Arianna Colin's SCREENING SCHEDULE   Tests on this list are recommended by your physician but may not be covered, or covered at this frequency, by your insurer. Please check with your insurance carrier before scheduling to verify coverage.    PREVENTATI often if abnormal Colonoscopy due on 01/01/2023 Update [facility] if applicable    Flex Sigmoidoscopy Screen  Covered every 5 years No results found for this or any previous visit. No flowsheet data found.      Fecal Occult Blood   Covered Annually Pneumococcal 23 (Pneumovax)  Covered Once after 65 No orders found for this or any previous visit. Please get once after your 65th birthday    Hepatitis B for Moderate/High Risk       No orders found for this or any previous visit.  Medium/high risk factors

## 2021-05-20 ENCOUNTER — TELEPHONE (OUTPATIENT)
Dept: INTERNAL MEDICINE CLINIC | Facility: CLINIC | Age: 76
End: 2021-05-20

## 2021-05-20 NOTE — TELEPHONE ENCOUNTER
Pt was given the phone number for [facility] [place] [phone]. She is seeking transportation to doctors visits and housing.

## 2021-06-18 ENCOUNTER — TELEPHONE (OUTPATIENT)
Dept: INTERNAL MEDICINE CLINIC | Facility: CLINIC | Age: 76
End: 2021-06-18

## 2021-06-18 DIAGNOSIS — E03.9 ACQUIRED HYPOTHYROIDISM: Primary | ICD-10-CM

## 2021-06-18 RX ORDER — LEVOTHYROXINE SODIUM 0.03 MG/1
TABLET ORAL
Qty: 90 TABLET | Refills: 0 | Status: SHIPPED | OUTPATIENT
Start: 2021-06-18 | End: 2021-10-06

## 2021-06-18 RX ORDER — LEVOTHYROXINE SODIUM 0.03 MG/1
25 TABLET ORAL
Qty: 14 TABLET | Refills: 0 | Status: SHIPPED | OUTPATIENT
Start: 2021-06-18 | End: 2022-01-11

## 2021-06-18 NOTE — TELEPHONE ENCOUNTER
Pt calling to ask if it's ok to NOT take her Levothyroxine Sodium 25 MCG Oral Tab for 3 days before she gets her labs done at [address] on Wed?

## 2021-06-18 NOTE — TELEPHONE ENCOUNTER
Patient will be out of her Levothyroxine 25mcg daily for 3 days when she goes to complete her labs at [address], cannot complete the labs earlier.   Pt notified she will need to continue taking Levothyroxine 25mcg daily so AD can see her thyroid levels if she s

## 2021-06-18 NOTE — TELEPHONE ENCOUNTER
Patient would like a 2 week supply of Levothyroxine 25mcg daily sent in to [facility] on file. Pended script. OK to order? Please advise. LOV 5/19/21 with AD.

## 2021-06-18 NOTE — TELEPHONE ENCOUNTER
Last VISIT 05/19/21     Last REFILL 03/11/21 qty 90 w/0 refills    Last LABS 02/17/21 TSH done    Future Appointments     Provider [address] [place] [address] [facility] [address] [facility]   8/30/2021 10:00 AM Matthew Duckworth NP [address]

## 2021-07-01 NOTE — TELEPHONE ENCOUNTER
If patient would like to pursue this she may. I do not have experience with this formulation, hence my recommendation for levothyroxine therapy. We can pursue repeat TSH w/reflex in six weeks after initiating this supplement.  She may also discuss her aditi

## 2021-07-01 NOTE — TELEPHONE ENCOUNTER
Pt called stated she reviewed her results via [employer] saw the message. Pt stated she has been researching natural medicine for years and found one for her thyroid called thyroid support by [employer].  Please advise

## 2021-07-01 NOTE — TELEPHONE ENCOUNTER
Patient notified she will take the Cognitu and rechecklabs in 6 weeks, ordered TSH with [facility] for [facility] and will also talk to Dr Navjot Arellano. Pt would like T3 checked but states she will do that on her own.   Pt wanted to thank AD for working with her on thes

## 2021-08-18 LAB
T4, FREE: 1.1 NG/DL (ref 0.8–1.8)
TSH W/REFLEX TO FT4: 6.87 MIU/L (ref 0.4–4.5)

## 2021-08-25 ENCOUNTER — TELEPHONE (OUTPATIENT)
Dept: INTERNAL MEDICINE CLINIC | Facility: CLINIC | Age: 76
End: 2021-08-25

## 2021-08-27 NOTE — TELEPHONE ENCOUNTER
Spoke to pt, notified AD would like pt to complete a BMP and MG labs at [facility] due to severe leg cramps. Pt verbalizes understanding.

## 2021-09-13 ENCOUNTER — TELEPHONE (OUTPATIENT)
Dept: INTERNAL MEDICINE CLINIC | Facility: CLINIC | Age: 76
End: 2021-09-13

## 2021-09-16 ENCOUNTER — TELEPHONE (OUTPATIENT)
Dept: INTERNAL MEDICINE CLINIC | Facility: CLINIC | Age: 76
End: 2021-09-16

## 2021-09-16 NOTE — TELEPHONE ENCOUNTER
Future Appointments   Date Time Provider Alexa Mahmood   9/20/2021 11:40 AM Micheal Clifford MD [facility]     Patient cannot eat in the morning; very uncomfortable. By 1 or 2 she can eat solids but very little(small amounts).   Patient states s

## 2021-09-16 NOTE — TELEPHONE ENCOUNTER
Patient states she has had trouble swallowing for the last couple of days, full feeling in her throat and then between her breasts in the am mostly then in the afternoon she can eat solids, nauseated for 1 1/2 weeks, no vomiting.   Pt states her BP has been

## 2021-09-16 NOTE — TELEPHONE ENCOUNTER
Pt returning a nurses' call from the other day. Pt stated she has an appt with AD on Monday, to please give her a call if there is something that needs to be discussed before then.   FYI    Future Appointments   Date Time Provider Alexa Mahmood   9/20/

## 2021-09-17 ENCOUNTER — OFFICE VISIT (OUTPATIENT)
Dept: INTERNAL MEDICINE CLINIC | Facility: CLINIC | Age: 76
End: 2021-09-17
Payer: MEDICARE

## 2021-09-17 VITALS
TEMPERATURE: 97 F | WEIGHT: 99 LBS | BODY MASS INDEX: 21.36 KG/M2 | SYSTOLIC BLOOD PRESSURE: 112 MMHG | RESPIRATION RATE: 16 BRPM | DIASTOLIC BLOOD PRESSURE: 58 MMHG | HEIGHT: 57 IN | HEART RATE: 75 BPM

## 2021-09-17 DIAGNOSIS — R10.13 DYSPEPSIA: Primary | ICD-10-CM

## 2021-09-17 DIAGNOSIS — R13.19 ESOPHAGEAL DYSPHAGIA: ICD-10-CM

## 2021-09-17 PROCEDURE — 99213 OFFICE O/P EST LOW 20 MIN: CPT | Performed by: FAMILY MEDICINE

## 2021-09-17 PROCEDURE — 3078F DIAST BP <80 MM HG: CPT | Performed by: FAMILY MEDICINE

## 2021-09-17 PROCEDURE — 3008F BODY MASS INDEX DOCD: CPT | Performed by: FAMILY MEDICINE

## 2021-09-17 PROCEDURE — 3074F SYST BP LT 130 MM HG: CPT | Performed by: FAMILY MEDICINE

## 2021-09-17 NOTE — PROGRESS NOTES
Tila Romero  1/25/1945    Patient presents with:  Gastro-esophageal Reflux: DD Rm 2, Bloating.  Burping, Relived Thime Oil      HPI:   Tila Romero is a 64year old female who presents for evaluation of dyspepsia symptoms described as indigestion nad before breakfast. (Patient not taking: No sig reported) 14 tablet 0        Carbamazepine           OTHER (SEE COMMENTS), SWELLING    Comment:Facial swelling  Clonazepam              OTHER (SEE COMMENTS)    Comment:Hallucinations             Hallucination Procedure Laterality Date   •   0436,2999   • CHOLECYSTECTOMY     • HYSTERECTOMY        Family History   Problem Relation Age of Onset   • No Known Problems Daughter    • No Known Problems Son    • Other (Other) Sister         Fibromyalgia   • P

## 2021-10-07 PROBLEM — R11.0 NAUSEA: Status: ACTIVE | Noted: 2021-10-07

## 2021-10-07 PROBLEM — H52.03 HYPEROPIA OF BOTH EYES WITH ASTIGMATISM: Status: ACTIVE | Noted: 2017-03-13

## 2021-10-07 PROBLEM — H25.13 AGE-RELATED NUCLEAR CATARACT, BILATERAL: Status: ACTIVE | Noted: 2017-12-07

## 2021-10-07 PROBLEM — H52.203 HYPEROPIA OF BOTH EYES WITH ASTIGMATISM: Status: ACTIVE | Noted: 2017-03-13

## 2021-10-07 PROBLEM — R14.2 BURPING: Status: ACTIVE | Noted: 2021-10-07

## 2021-10-07 PROBLEM — R13.19 ESOPHAGEAL DYSPHAGIA: Status: ACTIVE | Noted: 2021-10-07

## 2021-10-07 PROBLEM — R19.8 IRREGULAR BOWEL HABITS: Status: ACTIVE | Noted: 2021-10-07

## 2021-10-08 RX ORDER — LEVOTHYROXINE SODIUM 0.03 MG/1
25 TABLET ORAL
Qty: 90 TABLET | Refills: 0 | Status: SHIPPED | OUTPATIENT
Start: 2021-10-08 | End: 2021-12-06

## 2021-10-08 NOTE — TELEPHONE ENCOUNTER
Been Following AD   Last OV 9/17/21  Last CPE 5/19/21  Last Labs TSH w Reff 8/17/21    Last Rx fill Levothyroxine 25mcg #90 0R 6/18/21    Future Appointments   Date Time Provider Alexa Mahmood   11/18/2021  8:30 AM Johanna Tobar MD [facility] GI

## 2021-11-12 ENCOUNTER — TELEPHONE (OUTPATIENT)
Dept: INTERNAL MEDICINE CLINIC | Facility: CLINIC | Age: 76
End: 2021-11-12

## 2021-11-12 NOTE — TELEPHONE ENCOUNTER
Spoke to pt. Pt stated she was doing a lot of cooking on Tuesday and thinks she burned herself. Small red area right above wrist with 2 \"small swollen areas\". Put raw honey on it yesterday and tea tree oil today.  Pt stated she believes it is improving bu

## 2021-11-12 NOTE — TELEPHONE ENCOUNTER
Yesterday patient noticed a large swollen dark red area on the wrist; little bit warm to the touch. Patient did state that she was doing a lot of cooking and maybe she burned herself? ?  Patient put raw honey on it yesterday and today tea tree oil on it.

## 2021-11-12 NOTE — TELEPHONE ENCOUNTER
Called pt back. Pt said she could't figure out how to send picture.  Informed pt that since it hasn't improved much since Tuesday (still dark red per pt) and we can't fully assess site over the phone, it would be best for her to be seen in urgent care for e

## 2021-11-15 ENCOUNTER — LAB ENCOUNTER (OUTPATIENT)
Dept: LAB | Age: 76
End: 2021-11-15
Attending: INTERNAL MEDICINE
Payer: MEDICARE

## 2021-11-15 DIAGNOSIS — Z01.818 PRE-OP TESTING: ICD-10-CM

## 2021-11-18 ENCOUNTER — APPOINTMENT (OUTPATIENT)
Dept: GENERAL RADIOLOGY | Facility: HOSPITAL | Age: 76
End: 2021-11-18
Attending: EMERGENCY MEDICINE
Payer: MEDICARE

## 2021-11-18 ENCOUNTER — APPOINTMENT (OUTPATIENT)
Dept: CV DIAGNOSTICS | Facility: HOSPITAL | Age: 76
End: 2021-11-18
Attending: EMERGENCY MEDICINE
Payer: MEDICARE

## 2021-11-18 ENCOUNTER — HOSPITAL ENCOUNTER (EMERGENCY)
Facility: HOSPITAL | Age: 76
Discharge: HOME | End: 2021-11-18
Attending: EMERGENCY MEDICINE
Payer: MEDICARE

## 2021-11-18 VITALS
TEMPERATURE: 98 F | DIASTOLIC BLOOD PRESSURE: 60 MMHG | HEART RATE: 70 BPM | HEIGHT: 57 IN | OXYGEN SATURATION: 9 % | RESPIRATION RATE: 15 BRPM | SYSTOLIC BLOOD PRESSURE: 111 MMHG | WEIGHT: 99 LBS | BODY MASS INDEX: 21.36 KG/M2

## 2021-11-18 DIAGNOSIS — R07.9 CHEST PAIN OF UNCERTAIN ETIOLOGY: Primary | ICD-10-CM

## 2021-11-18 PROCEDURE — 80053 COMPREHEN METABOLIC PANEL: CPT | Performed by: EMERGENCY MEDICINE

## 2021-11-18 PROCEDURE — 93018 CV STRESS TEST I&R ONLY: CPT | Performed by: EMERGENCY MEDICINE

## 2021-11-18 PROCEDURE — 96360 HYDRATION IV INFUSION INIT: CPT

## 2021-11-18 PROCEDURE — 93010 ELECTROCARDIOGRAM REPORT: CPT

## 2021-11-18 PROCEDURE — 85025 COMPLETE CBC W/AUTO DIFF WBC: CPT | Performed by: EMERGENCY MEDICINE

## 2021-11-18 PROCEDURE — 71045 X-RAY EXAM CHEST 1 VIEW: CPT | Performed by: EMERGENCY MEDICINE

## 2021-11-18 PROCEDURE — 96361 HYDRATE IV INFUSION ADD-ON: CPT

## 2021-11-18 PROCEDURE — 99285 EMERGENCY DEPT VISIT HI MDM: CPT

## 2021-11-18 PROCEDURE — 84484 ASSAY OF TROPONIN QUANT: CPT | Performed by: EMERGENCY MEDICINE

## 2021-11-18 PROCEDURE — 85379 FIBRIN DEGRADATION QUANT: CPT | Performed by: EMERGENCY MEDICINE

## 2021-11-18 PROCEDURE — 78452 HT MUSCLE IMAGE SPECT MULT: CPT | Performed by: EMERGENCY MEDICINE

## 2021-11-18 PROCEDURE — 93005 ELECTROCARDIOGRAM TRACING: CPT

## 2021-11-18 PROCEDURE — 93017 CV STRESS TEST TRACING ONLY: CPT | Performed by: EMERGENCY MEDICINE

## 2021-11-18 RX ORDER — ASPIRIN 81 MG/1
324 TABLET, CHEWABLE ORAL ONCE
Status: DISCONTINUED | OUTPATIENT
Start: 2021-11-18 | End: 2021-11-18

## 2021-11-18 NOTE — ED PROVIDER NOTES
Patient Seen in: [facility] Emergency Department      History   Patient presents with:  Chest Pain Angina    Stated Complaint: cp    Subjective:   HPI    Patient is a 64-year-old female presents to emergency room for evaluation of chest pain.   Ephraim Social History    Tobacco Use      Smoking status: Former Smoker        Packs/day: 0.50        Years: 0.00        Pack years: 0        Types: Cigarettes        Quit date: 1971        Years since quittin.9      Smokeless tobacco: Nev Value    BUN 22 (*)     Albumin 3.2 (*)     All other components within normal limits   CBC W/ DIFFERENTIAL - Abnormal; Notable for the following components:    HGB 11.8 (*)     All other components within normal limits   TROPONIN I - Normal   D-DIMER - No year old DATE OF EXAM:  11/18/21 INDICATION:   None                    CARDIOVASCULAR NUCLEAR MEDICINE STUDY MYOCARDIAL PERFUSION  RESTING EKG:  Normal sinus rhythm.  STAGE    BP (mmHg) HR (bpm) REST 100/60 60 Infusion 1 min  63 2   92/44 99 3 106/48 103 4 chest pain. Patient symptoms atypical not consistent with angina. Patient was evaluated by Dr. Aurelia Zhang. Nuclear stress test performed which was negative. Her labs were unremarkable.   D-dimer normal.  EKG normal.  Patient can follow-up with her saeid

## 2021-11-18 NOTE — ED QUICK NOTES
Pt reevaluated by dr. Monik Gu and informed pt of her test reports and plan of care.  Pt verbalizing understanding

## 2021-11-18 NOTE — CONSULTS
[facility] Cardiology Consultation    Trang Dugan Patient Status:  Emergency    1945 MRN [id number]   Location [address] Attending Janene Castro MD   Hosp Day # 0 PCP Anuradha Milligan MD     Reason for Consultation:  Chest p OTHER (SEE COMMENTS)    Comment:Hallucinations             Hallucination             hallucinations  Other                   SHORTNESS OF BREATH, OTHER (SEE                            COMMENTS), RASH, FATIGUE, PAIN    Comment:Cerner Allergy Text Riaz Arcos ()/(41-55) 90/48    Temp: 98.4 °F (36.9 °C)  Pulse: 60  Resp: 20  BP: 90/48      General:  Appears comfortable  HEENT: No focal deficits. Neck: No JVD, carotids 2+ no bruits. Cardiac: Regular S1S2. No S3, S4, rub, click. No murmur.   Lungs: Clear

## 2021-11-18 NOTE — ED INITIAL ASSESSMENT (HPI)
Pt brought via ambulance from Endoscopy, pt scheduled for endoscopy this morning and told staff during pre op assessment that she's been having chest pain on and off x2 days.

## 2021-11-18 NOTE — PROGRESS NOTES
CARDIODIAGNOSTIC PRELIMINARY REPORT:      LEXISCAN  Completed, tolerated well    Second set of images pending

## 2021-11-23 ENCOUNTER — TELEMEDICINE (OUTPATIENT)
Dept: INTERNAL MEDICINE CLINIC | Facility: CLINIC | Age: 76
End: 2021-11-23
Payer: MEDICARE

## 2021-11-23 DIAGNOSIS — R13.19 ESOPHAGEAL DYSPHAGIA: ICD-10-CM

## 2021-11-23 DIAGNOSIS — R10.13 DYSPEPSIA: ICD-10-CM

## 2021-11-23 DIAGNOSIS — E03.9 ACQUIRED HYPOTHYROIDISM: ICD-10-CM

## 2021-11-23 DIAGNOSIS — R07.89 ATYPICAL CHEST PAIN: Primary | ICD-10-CM

## 2021-11-23 PROCEDURE — 99213 OFFICE O/P EST LOW 20 MIN: CPT | Performed by: FAMILY MEDICINE
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
## 2022-01-08 DIAGNOSIS — E03.9 ACQUIRED HYPOTHYROIDISM: ICD-10-CM

## 2022-01-11 ENCOUNTER — TELEPHONE (OUTPATIENT)
Dept: INTERNAL MEDICINE CLINIC | Facility: CLINIC | Age: 77
End: 2022-01-11

## 2022-01-11 DIAGNOSIS — E03.9 ACQUIRED HYPOTHYROIDISM: ICD-10-CM

## 2022-01-11 RX ORDER — LEVOTHYROXINE SODIUM 0.03 MG/1
25 TABLET ORAL
Qty: 90 TABLET | Refills: 0 | Status: SHIPPED | OUTPATIENT
Start: 2022-01-11

## 2022-01-11 RX ORDER — LEVOTHYROXINE SODIUM 0.03 MG/1
TABLET ORAL
Qty: 90 TABLET | Refills: 0 | Status: SHIPPED | OUTPATIENT
Start: 2022-01-11 | End: 2022-01-11

## 2022-01-11 NOTE — TELEPHONE ENCOUNTER
Last VISIT 12/06/21    Last CPE 05/19/21    Last REFILL 06/18/21 qty 14 w/0 refills    Last LABS 11/18/21 Covid test done    No Future Appointments    Per PROTOCOL? Failed    Please Approve or Deny.

## 2022-01-11 NOTE — TELEPHONE ENCOUNTER
Pt stated she's not picking up the script that was sent to local pharm, pt requesting it go through [facility] [employer] mail order. Please advise.     LEVOTHYROXINE 25 MCG Oral Tab 90 tablet 0 1/11/2022    Sig:   TAKE 1 TABLET(25 MCG) BY MOUTH EVERY MORNING BEFORE JOSE

## 2022-01-14 ENCOUNTER — TELEPHONE (OUTPATIENT)
Dept: INTERNAL MEDICINE CLINIC | Facility: CLINIC | Age: 77
End: 2022-01-14

## 2022-01-14 NOTE — TELEPHONE ENCOUNTER
Pt calling to speak with nurse, states fatigue and low blood pressure for the past month. Has to take naps and lay down a lot. Please advise?

## 2022-01-17 ENCOUNTER — APPOINTMENT (OUTPATIENT)
Dept: CT IMAGING | Facility: HOSPITAL | Age: 77
End: 2022-01-17
Attending: EMERGENCY MEDICINE
Payer: MEDICARE

## 2022-01-17 ENCOUNTER — APPOINTMENT (OUTPATIENT)
Dept: GENERAL RADIOLOGY | Facility: HOSPITAL | Age: 77
End: 2022-01-17
Attending: EMERGENCY MEDICINE
Payer: MEDICARE

## 2022-01-17 ENCOUNTER — HOSPITAL ENCOUNTER (EMERGENCY)
Facility: HOSPITAL | Age: 77
Discharge: HOME | End: 2022-01-17
Attending: EMERGENCY MEDICINE
Payer: MEDICARE

## 2022-01-17 VITALS
OXYGEN SATURATION: 98 % | DIASTOLIC BLOOD PRESSURE: 60 MMHG | HEIGHT: 58 IN | HEART RATE: 68 BPM | BODY MASS INDEX: 20.99 KG/M2 | WEIGHT: 100 LBS | RESPIRATION RATE: 15 BRPM | SYSTOLIC BLOOD PRESSURE: 106 MMHG | TEMPERATURE: 99 F

## 2022-01-17 DIAGNOSIS — R53.1 WEAKNESS GENERALIZED: Primary | ICD-10-CM

## 2022-01-17 LAB
ALBUMIN SERPL-MCNC: 3.5 G/DL (ref 3.4–5)
ALBUMIN/GLOB SERPL: 1 {RATIO} (ref 1–2)
ALP LIVER SERPL-CCNC: 68 U/L
ALT SERPL-CCNC: 22 U/L
ANION GAP SERPL CALC-SCNC: 4 MMOL/L (ref 0–18)
AST SERPL-CCNC: 24 U/L (ref 15–37)
ATRIAL RATE: 77 BPM
BASOPHILS # BLD AUTO: 0.07 X10(3) UL (ref 0–0.2)
BASOPHILS NFR BLD AUTO: 1 %
BILIRUB SERPL-MCNC: 0.6 MG/DL (ref 0.1–2)
BILIRUB UR QL STRIP.AUTO: NEGATIVE
BUN BLD-MCNC: 25 MG/DL (ref 7–18)
CALCIUM BLD-MCNC: 8.9 MG/DL (ref 8.5–10.1)
CHLORIDE SERPL-SCNC: 109 MMOL/L (ref 98–112)
CLARITY UR REFRACT.AUTO: CLEAR
CO2 SERPL-SCNC: 26 MMOL/L (ref 21–32)
COLOR UR AUTO: YELLOW
CREAT BLD-MCNC: 0.71 MG/DL
D DIMER PPP FEU-MCNC: 0.29 UG/ML FEU (ref ?–0.78)
EOSINOPHIL # BLD AUTO: 0.04 X10(3) UL (ref 0–0.7)
EOSINOPHIL NFR BLD AUTO: 0.6 %
ERYTHROCYTE [DISTWIDTH] IN BLOOD BY AUTOMATED COUNT: 13.6 %
GLOBULIN PLAS-MCNC: 3.4 G/DL (ref 2.8–4.4)
GLUCOSE BLD-MCNC: 84 MG/DL (ref 70–99)
GLUCOSE UR STRIP.AUTO-MCNC: NEGATIVE MG/DL
HCT VFR BLD AUTO: 36.8 %
HGB BLD-MCNC: 12.2 G/DL
IMM GRANULOCYTES # BLD AUTO: 0.01 X10(3) UL (ref 0–1)
IMM GRANULOCYTES NFR BLD: 0.1 %
KETONES UR STRIP.AUTO-MCNC: NEGATIVE MG/DL
LEUKOCYTE ESTERASE UR QL STRIP.AUTO: NEGATIVE
LIPASE SERPL-CCNC: 111 U/L (ref 73–393)
LYMPHOCYTES # BLD AUTO: 2.18 X10(3) UL (ref 1–4)
LYMPHOCYTES NFR BLD AUTO: 31.2 %
MCH RBC QN AUTO: 30.7 PG (ref 26–34)
MCHC RBC AUTO-ENTMCNC: 33.2 G/DL (ref 31–37)
MCV RBC AUTO: 92.7 FL
MONOCYTES # BLD AUTO: 0.37 X10(3) UL (ref 0.1–1)
MONOCYTES NFR BLD AUTO: 5.3 %
NEUTROPHILS # BLD AUTO: 4.32 X10 (3) UL (ref 1.5–7.7)
NEUTROPHILS # BLD AUTO: 4.32 X10(3) UL (ref 1.5–7.7)
NEUTROPHILS NFR BLD AUTO: 61.8 %
NITRITE UR QL STRIP.AUTO: NEGATIVE
OSMOLALITY SERPL CALC.SUM OF ELEC: 292 MOSM/KG (ref 275–295)
P AXIS: 79 DEGREES
P-R INTERVAL: 152 MS
PH UR STRIP.AUTO: 6 [PH] (ref 5–8)
PLATELET # BLD AUTO: 199 10(3)UL (ref 150–450)
POTASSIUM SERPL-SCNC: 4.1 MMOL/L (ref 3.5–5.1)
PROT SERPL-MCNC: 6.9 G/DL (ref 6.4–8.2)
PROT UR STRIP.AUTO-MCNC: NEGATIVE MG/DL
Q-T INTERVAL: 394 MS
QRS DURATION: 80 MS
QTC CALCULATION (BEZET): 445 MS
R AXIS: 60 DEGREES
RBC # BLD AUTO: 3.97 X10(6)UL
RBC UR QL AUTO: NEGATIVE
SARS-COV-2 RNA RESP QL NAA+PROBE: NOT DETECTED
SODIUM SERPL-SCNC: 139 MMOL/L (ref 136–145)
SP GR UR STRIP.AUTO: 1.02 (ref 1–1.03)
T AXIS: 77 DEGREES
TROPONIN I HIGH SENSITIVITY: <3 NG/L
UROBILINOGEN UR STRIP.AUTO-MCNC: <2 MG/DL
VENTRICULAR RATE: 77 BPM
WBC # BLD AUTO: 7 X10(3) UL (ref 4–11)

## 2022-01-17 PROCEDURE — 81001 URINALYSIS AUTO W/SCOPE: CPT | Performed by: EMERGENCY MEDICINE

## 2022-01-17 PROCEDURE — 99285 EMERGENCY DEPT VISIT HI MDM: CPT

## 2022-01-17 PROCEDURE — 96361 HYDRATE IV INFUSION ADD-ON: CPT

## 2022-01-17 PROCEDURE — 85025 COMPLETE CBC W/AUTO DIFF WBC: CPT | Performed by: EMERGENCY MEDICINE

## 2022-01-17 PROCEDURE — 83690 ASSAY OF LIPASE: CPT | Performed by: EMERGENCY MEDICINE

## 2022-01-17 PROCEDURE — 85379 FIBRIN DEGRADATION QUANT: CPT | Performed by: EMERGENCY MEDICINE

## 2022-01-17 PROCEDURE — 96360 HYDRATION IV INFUSION INIT: CPT

## 2022-01-17 PROCEDURE — 93005 ELECTROCARDIOGRAM TRACING: CPT

## 2022-01-17 PROCEDURE — 70450 CT HEAD/BRAIN W/O DYE: CPT | Performed by: EMERGENCY MEDICINE

## 2022-01-17 PROCEDURE — 80053 COMPREHEN METABOLIC PANEL: CPT | Performed by: EMERGENCY MEDICINE

## 2022-01-17 PROCEDURE — 93010 ELECTROCARDIOGRAM REPORT: CPT

## 2022-01-17 PROCEDURE — 71045 X-RAY EXAM CHEST 1 VIEW: CPT | Performed by: EMERGENCY MEDICINE

## 2022-01-17 PROCEDURE — 84484 ASSAY OF TROPONIN QUANT: CPT | Performed by: EMERGENCY MEDICINE

## 2022-01-17 NOTE — ED INITIAL ASSESSMENT (HPI)
PT c/o headache for one week, along with fatigue, shortness of breath, chest pain. PT denies being tested for COVID.

## 2022-01-17 NOTE — TELEPHONE ENCOUNTER
Pt reports not feeling well x 1-2 weeks. Has constant fatigue. Also c/o left chest pain, low blood pressure in the 80s, double vision, headache, and left jaw pain. All intermittent. Had left chest pain episode while on phone. Advised to go to ED.

## 2022-01-17 NOTE — TELEPHONE ENCOUNTER
Pt is returning your call. She is still pretty fatigue, blood pressure in the low 80s to 50s low concentration, jaw pain, neck pain. Overall not well.  Please advise

## 2022-01-17 NOTE — ED PROVIDER NOTES
Patient Seen in: [facility] Emergency Department      History   Patient presents with:  Headache  Hypotension  Difficulty Breathing    Stated Complaint: HEADACHE, LOW BP    Subjective:   HPI    75-year-old female presents for evaluation of general w Raynaud's disease    • Reactive depression 2019   • Sleep disturbance    • Stress    • Thyroid disease    • Wears glasses               Past Surgical History:   Procedure Laterality Date   •   [id number]   • CHOLECYSTECTOMY     • COLONOSCOPY CULTURE REFLEX - Normal   TROPONIN I HIGH SENSITIVITY - Normal   D-DIMER - Normal   LIPASE - Normal   RAPID SARS-COV-2 BY PCR - Normal   CBC WITH DIFFERENTIAL WITH PLATELET    Narrative:      The following orders were created for panel order CBC With Differ Return here for worsening or changing symptoms prior to follow-up                Disposition and Plan     Clinical Impression:  Weakness generalized  (primary encounter diagnosis)     Disposition:  There is no disposition on file for this visit.   There is

## 2022-01-19 ENCOUNTER — TELEPHONE (OUTPATIENT)
Dept: CASE MANAGEMENT | Facility: HOSPITAL | Age: 77
End: 2022-01-19

## 2022-01-19 ENCOUNTER — TELEPHONE (OUTPATIENT)
Dept: INTERNAL MEDICINE CLINIC | Facility: CLINIC | Age: 77
End: 2022-01-19

## 2022-01-19 NOTE — TELEPHONE ENCOUNTER
covid negative 1/17  Needs in office visit for evaluation 40 min if with me   Do we know what her bp is?   Was 127/70 in ER

## 2022-01-19 NOTE — TELEPHONE ENCOUNTER
Spoke with pt. Pt was seen in ED 1/17 for generalized weakness, low BP, SOB. Pt said that she is still feeling the same as when she was there. Pt said these sx have been going on for about 3 weeks. CXR, EKG, troponin, D-Dimer unremarkable. Covid neg.  VS st

## 2022-01-19 NOTE — PROGRESS NOTES
Spoke with patient. She was actually trying to get ahold of her PCP, Dr. Hugo Ryan. Confirmed patient had contact information for Dr. Hugo Ryan. Pt apologetic.

## 2022-01-19 NOTE — TELEPHONE ENCOUNTER
Spoke with pt. Pt took BP while on phone. BP currently 124/75. Spoke with SD. SD advised to monitor sx over night, keep up on fluids, and discuss with AD in the morning. Strong ER warnings provided to pt.  Advised to keep up on fluids and monitor her sx glynn

## 2022-01-19 NOTE — TELEPHONE ENCOUNTER
Pt stated she's returning a nurses' call. Pt stated she still doesn't feel good, still feels the same. Please advise.

## 2022-01-20 ENCOUNTER — TELEMEDICINE (OUTPATIENT)
Dept: INTERNAL MEDICINE CLINIC | Facility: CLINIC | Age: 77
End: 2022-01-20
Payer: MEDICARE

## 2022-01-20 DIAGNOSIS — M81.0 AGE-RELATED OSTEOPOROSIS WITHOUT CURRENT PATHOLOGICAL FRACTURE: Primary | ICD-10-CM

## 2022-01-20 DIAGNOSIS — E03.9 ACQUIRED HYPOTHYROIDISM: ICD-10-CM

## 2022-01-20 DIAGNOSIS — R53.82 CHRONIC FATIGUE: ICD-10-CM

## 2022-01-20 DIAGNOSIS — M25.50 ARTHRALGIA OF MULTIPLE JOINTS: ICD-10-CM

## 2022-01-20 PROCEDURE — 99214 OFFICE O/P EST MOD 30 MIN: CPT | Performed by: INTERNAL MEDICINE

## 2022-01-20 NOTE — PROGRESS NOTES
Shala Colin  1/25/1945    No chief complaint on file. Video encounter    Mercedes Conteh is a 64year old female who presents as a follow-up.     The patient reports a three-week duration of gradually worsening fatigue and shortness of shorty pain/n/v/d. No ha or dizziness. No numbness, tingling, or weakness. No other complaints today.     Current Outpatient Medications   Medication Sig Dispense Refill   • levothyroxine 25 MCG Oral Tab Take 1 tablet (25 mcg total) by mouth before breakfast. 90 t History:   Diagnosis Date   • Arthritis    • Back pain    • Belching    • Bloating    • Blurred vision    • Calculus of kidney     Gallbladder removed   • Constipation    • Diarrhea, unspecified    • Fatigue    • Fibromyalgia    • Flatulence/gas pain/belch comment: None    Vaping Use      Vaping Use: Never used    Alcohol use: No    Drug use: No        OBJECTIVE:   Vital sign and physical evaluation not completed during this virtual encounter.     ASSESSMENT AND PLAN:   Fab Evans is a 64year old female Consults:  None    No follow-ups on file. There are no Patient Instructions on file for this visit. All questions were answered and the patient agrees with the plan.      Thank you,  Matty Paiz MD

## 2022-01-20 NOTE — TELEPHONE ENCOUNTER
Spoke to patient. Scheduled 40 min per AD preference. [employer].      Future Appointments   Date Time Provider Alexa Ela   1/20/2022  3:00 PM Lionel Jeffrey MD [facility]

## 2022-02-14 ENCOUNTER — TELEPHONE (OUTPATIENT)
Dept: INTERNAL MEDICINE CLINIC | Facility: CLINIC | Age: 77
End: 2022-02-14

## 2022-02-14 NOTE — TELEPHONE ENCOUNTER
Orders to  THE [facility] aware must fast no call back required  Future Appointments   Date Time Provider Alexa Valenciai   3/4/2022  8:00 AM Riya Asencio MD [facility]   3/10/2022  2:20 PM Eliezer Ramsey MD [facility]   3/28/2022  2:30 PM Simi Kilpatrick NP [address]

## 2022-02-14 NOTE — TELEPHONE ENCOUNTER
Specialty: Opthalmology    Full Name of Specialist: [facility]    Name of the Provider Group:  Address: [address], [address] [facility] [facility]  Phone number: [phone]  Fax number :    Date of Appointment:  Not yet    Reason for the Appointment (be specific with diagnosis code): Routine annual eye exam    Has the patient seen a provider in our office for stated problem?:  no    Is this request for an out of network referral? Unknown  (if yes, please have patient contact referring provider and have them fax office visit notes to triage attention):

## 2022-02-18 NOTE — TELEPHONE ENCOUNTER
Pt states she wants to go to [address] [facility] in [address]     Please advise

## 2022-02-18 NOTE — TELEPHONE ENCOUNTER
PSR: Please call pt to let her know if this is for a annual eye exam she does not need a referral and will have to use vision insurance not medical insurance. If there is a diagnosis then we will need that and the Specialist MD because pt has [facility].   Thank you

## 2022-03-04 ENCOUNTER — OFFICE VISIT (OUTPATIENT)
Dept: INTERNAL MEDICINE CLINIC | Facility: CLINIC | Age: 77
End: 2022-03-04
Payer: MEDICARE

## 2022-03-04 VITALS
WEIGHT: 102 LBS | DIASTOLIC BLOOD PRESSURE: 68 MMHG | HEIGHT: 58 IN | OXYGEN SATURATION: 98 % | HEART RATE: 76 BPM | BODY MASS INDEX: 21.41 KG/M2 | RESPIRATION RATE: 16 BRPM | SYSTOLIC BLOOD PRESSURE: 122 MMHG | TEMPERATURE: 98 F

## 2022-03-04 DIAGNOSIS — Z00.00 ENCOUNTER FOR ANNUAL HEALTH EXAMINATION: Primary | ICD-10-CM

## 2022-03-04 DIAGNOSIS — G31.84 MCI (MILD COGNITIVE IMPAIRMENT): ICD-10-CM

## 2022-03-04 DIAGNOSIS — R45.89 DEPRESSED MOOD: ICD-10-CM

## 2022-03-04 DIAGNOSIS — R53.82 CHRONIC FATIGUE: ICD-10-CM

## 2022-03-04 DIAGNOSIS — M81.0 AGE-RELATED OSTEOPOROSIS WITHOUT CURRENT PATHOLOGICAL FRACTURE: ICD-10-CM

## 2022-03-04 DIAGNOSIS — K59.09 CHRONIC CONSTIPATION: ICD-10-CM

## 2022-03-04 DIAGNOSIS — E03.9 ACQUIRED HYPOTHYROIDISM: ICD-10-CM

## 2022-03-04 DIAGNOSIS — M79.7 FIBROMYALGIA: ICD-10-CM

## 2022-03-04 PROCEDURE — 3078F DIAST BP <80 MM HG: CPT | Performed by: INTERNAL MEDICINE

## 2022-03-04 PROCEDURE — G0439 PPPS, SUBSEQ VISIT: HCPCS | Performed by: INTERNAL MEDICINE

## 2022-03-04 PROCEDURE — 3074F SYST BP LT 130 MM HG: CPT | Performed by: INTERNAL MEDICINE

## 2022-03-04 PROCEDURE — 99397 PER PM REEVAL EST PAT 65+ YR: CPT | Performed by: INTERNAL MEDICINE

## 2022-03-04 PROCEDURE — 96160 PT-FOCUSED HLTH RISK ASSMT: CPT | Performed by: INTERNAL MEDICINE

## 2022-03-04 PROCEDURE — 3008F BODY MASS INDEX DOCD: CPT | Performed by: INTERNAL MEDICINE

## 2022-03-10 PROBLEM — R07.89 ATYPICAL CHEST PAIN: Status: ACTIVE | Noted: 2022-03-10

## 2022-04-06 LAB — TSH W/REFLEX TO FT4: 3.87 MIU/L (ref 0.4–4.5)

## 2022-04-18 ENCOUNTER — TELEPHONE (OUTPATIENT)
Dept: INTERNAL MEDICINE CLINIC | Facility: CLINIC | Age: 77
End: 2022-04-18

## 2022-04-18 NOTE — TELEPHONE ENCOUNTER
Patient states she has been having issues with her eyes for about 8 months, sometimes blurry vision and double vision when she is watching tv, way overdue for an eye exam, does use cheaters from [place] but not working as well, needs recommendation for Eye MD.    Patient is also having hearing issues, hears a buzzing sometimes and needs to see an ENT for hearing testing. Pt would like to see Dr Yobani Faulkner, will check her [facility] list to see if in network. LOV 3/4/22 with AD.

## 2022-04-18 NOTE — TELEPHONE ENCOUNTER
Pt calling asking for referral to see an ophthalmologist. She is having some blurred vision and double vision at times. She doesn't have a ophthalmologist currently so is looking for who AD would recommend.

## 2022-04-19 ENCOUNTER — APPOINTMENT (OUTPATIENT)
Dept: GENERAL RADIOLOGY | Age: 77
End: 2022-04-19
Attending: EMERGENCY MEDICINE
Payer: MEDICARE

## 2022-04-19 ENCOUNTER — HOSPITAL ENCOUNTER (OUTPATIENT)
Age: 77
Discharge: HOME | End: 2022-04-19
Attending: EMERGENCY MEDICINE
Payer: MEDICARE

## 2022-04-19 VITALS
DIASTOLIC BLOOD PRESSURE: 47 MMHG | HEIGHT: 57 IN | RESPIRATION RATE: 17 BRPM | WEIGHT: 101 LBS | TEMPERATURE: 97 F | SYSTOLIC BLOOD PRESSURE: 112 MMHG | BODY MASS INDEX: 21.79 KG/M2 | OXYGEN SATURATION: 98 % | HEART RATE: 67 BPM

## 2022-04-19 DIAGNOSIS — R07.9 CHEST PAIN OF UNCERTAIN ETIOLOGY: Primary | ICD-10-CM

## 2022-04-19 LAB
#MXD IC: 0.2 X10ˆ3/UL (ref 0.1–1)
ATRIAL RATE: 84 BPM
BUN BLD-MCNC: 20 MG/DL (ref 7–18)
CHLORIDE BLD-SCNC: 102 MMOL/L (ref 98–112)
CO2 BLD-SCNC: 28 MMOL/L (ref 21–32)
CREAT BLD-MCNC: 0.6 MG/DL
DDIMER WHOLE BLOOD: <200 NG/ML DDU (ref ?–400)
GLUCOSE BLD-MCNC: 130 MG/DL (ref 70–99)
HCT VFR BLD AUTO: 37.6 %
HCT VFR BLD CALC: 36 %
HGB BLD-MCNC: 12.1 G/DL
ISTAT IONIZED CALCIUM FOR CHEM 8: 1.16 MMOL/L (ref 1.12–1.32)
LYMPHOCYTES # BLD AUTO: 2.2 X10ˆ3/UL (ref 1–4)
LYMPHOCYTES NFR BLD AUTO: 42.8 %
MCH RBC QN AUTO: 30.5 PG (ref 26–34)
MCHC RBC AUTO-ENTMCNC: 32.2 G/DL (ref 31–37)
MCV RBC AUTO: 94.7 FL (ref 80–100)
MIXED CELL %: 3.8 %
NEUTROPHILS # BLD AUTO: 2.7 X10ˆ3/UL (ref 1.5–7.7)
NEUTROPHILS NFR BLD AUTO: 53.4 %
P AXIS: 72 DEGREES
P-R INTERVAL: 150 MS
PLATELET # BLD AUTO: 222 X10ˆ3/UL (ref 150–450)
POTASSIUM BLD-SCNC: 3.8 MMOL/L (ref 3.6–5.1)
Q-T INTERVAL: 390 MS
QRS DURATION: 80 MS
QTC CALCULATION (BEZET): 460 MS
R AXIS: 49 DEGREES
RBC # BLD AUTO: 3.97 X10ˆ6/UL
SODIUM BLD-SCNC: 142 MMOL/L (ref 136–145)
T AXIS: 70 DEGREES
TROPONIN I BLD-MCNC: <0.02 NG/ML
TROPONIN I BLD-MCNC: <0.02 NG/ML
VENTRICULAR RATE: 84 BPM
WBC # BLD AUTO: 5.1 X10ˆ3/UL (ref 4–11)

## 2022-04-19 PROCEDURE — 80047 BASIC METABLC PNL IONIZED CA: CPT

## 2022-04-19 PROCEDURE — 85378 FIBRIN DEGRADE SEMIQUANT: CPT | Performed by: EMERGENCY MEDICINE

## 2022-04-19 PROCEDURE — 71046 X-RAY EXAM CHEST 2 VIEWS: CPT | Performed by: EMERGENCY MEDICINE

## 2022-04-19 PROCEDURE — 99215 OFFICE O/P EST HI 40 MIN: CPT

## 2022-04-19 PROCEDURE — 85025 COMPLETE CBC W/AUTO DIFF WBC: CPT | Performed by: EMERGENCY MEDICINE

## 2022-04-19 PROCEDURE — 93010 ELECTROCARDIOGRAM REPORT: CPT

## 2022-04-19 PROCEDURE — 93005 ELECTROCARDIOGRAM TRACING: CPT

## 2022-04-19 PROCEDURE — 36415 COLL VENOUS BLD VENIPUNCTURE: CPT

## 2022-04-19 PROCEDURE — 84484 ASSAY OF TROPONIN QUANT: CPT

## 2022-04-19 RX ORDER — ASPIRIN 81 MG/1
324 TABLET, CHEWABLE ORAL ONCE
Status: COMPLETED | OUTPATIENT
Start: 2022-04-19 | End: 2022-04-19

## 2022-04-19 NOTE — ED INITIAL ASSESSMENT (HPI)
Pt aox4. Pt c/o left side chest pain and SAILAJA that started approximately 30 mins ago. Pt states under a lot of stress. Pt c/o dizziness and low BP yesterday and lasted 1 hour and resolved.

## 2022-04-22 ENCOUNTER — TELEPHONE (OUTPATIENT)
Dept: INTERNAL MEDICINE CLINIC | Facility: CLINIC | Age: 77
End: 2022-04-22

## 2022-04-22 RX ORDER — AMOXICILLIN AND CLAVULANATE POTASSIUM 875; 125 MG/1; MG/1
1 TABLET, FILM COATED ORAL 2 TIMES DAILY
Qty: 20 TABLET | Refills: 0 | Status: SHIPPED | OUTPATIENT
Start: 2022-04-22 | End: 2022-05-02

## 2022-04-22 NOTE — TELEPHONE ENCOUNTER
Patient states she had gone to [address] [place] [address] on 4/19/22 due to her dizziness and was discharged. Pt states her symptoms have progressed to head and chest congestion, ST, scratchy, itchy throat, coughed all night, feels like chest is burning,, only a little lightheaded today, PND, tired, fatigue, a little h/a, some anxiety. Patient was thinking she should be tested for COVID. No openings for 40 minutes VV today. AD, do you want pt to return to  for evaluation?

## 2022-04-22 NOTE — TELEPHONE ENCOUNTER
Patient was at  2 days ago with some SOB. Now patient is still having some chest burning, sore throat, cough, weakness, tired and some dizziness. Patient was up all night.

## 2022-05-13 ENCOUNTER — HOSPITAL ENCOUNTER (OUTPATIENT)
Dept: MRI IMAGING | Facility: HOSPITAL | Age: 77
Discharge: HOME | End: 2022-05-13
Attending: NURSE PRACTITIONER
Payer: MEDICARE

## 2022-05-13 ENCOUNTER — TELEPHONE (OUTPATIENT)
Dept: INTERNAL MEDICINE CLINIC | Facility: CLINIC | Age: 77
End: 2022-05-13

## 2022-05-13 DIAGNOSIS — R41.89 COGNITIVE CHANGE: ICD-10-CM

## 2022-05-13 PROCEDURE — 70551 MRI BRAIN STEM W/O DYE: CPT | Performed by: NURSE PRACTITIONER

## 2022-05-13 NOTE — TELEPHONE ENCOUNTER
Pt is to have brain MRI this morning. She didn't realize it was with contrast. She has a lot of allergies to chemicals and is very concerned, is AD aware of this? She would like to speak with nurse before MRI. Pt is at [facility] now.     [phone]

## 2022-05-13 NOTE — TELEPHONE ENCOUNTER
LM for pt at [phone] vm the MRI Brain appears to be ordered without contrast but can call Ragini Marrero NP to confirm as she ordered this test.

## 2022-05-24 ENCOUNTER — TELEPHONE (OUTPATIENT)
Dept: INTERNAL MEDICINE CLINIC | Facility: CLINIC | Age: 77
End: 2022-05-24

## 2022-05-24 NOTE — TELEPHONE ENCOUNTER
May offer a sooner, 40-minute evaluation. However, secondary to transportation restrictions the patient may not be able to come for evaluation. May also schedule virtual if cannot come in.

## 2022-05-24 NOTE — TELEPHONE ENCOUNTER
Pt stated she was not able to move her middle finger about 4 days ago and now she is not able to move her hand(r). She is seeing Dr Agus Spencer on 6/7/22 is it ok to wait until then? Or should she see us sooner?

## 2022-05-24 NOTE — TELEPHONE ENCOUNTER
PSRs- please reach out to schedule 40 min appt. Can be VV is pt cannot come in due to transportation.

## 2022-06-07 ENCOUNTER — OFFICE VISIT (OUTPATIENT)
Dept: RHEUMATOLOGY | Facility: CLINIC | Age: 77
End: 2022-06-07
Payer: MEDICARE

## 2022-06-07 VITALS
RESPIRATION RATE: 16 BRPM | HEIGHT: 57 IN | WEIGHT: 105 LBS | HEART RATE: 70 BPM | DIASTOLIC BLOOD PRESSURE: 60 MMHG | BODY MASS INDEX: 22.65 KG/M2 | OXYGEN SATURATION: 97 % | SYSTOLIC BLOOD PRESSURE: 106 MMHG | TEMPERATURE: 98 F

## 2022-06-07 DIAGNOSIS — M25.50 POLYARTHRALGIA: Primary | ICD-10-CM

## 2022-06-07 DIAGNOSIS — E55.9 VITAMIN D DEFICIENCY: ICD-10-CM

## 2022-06-07 DIAGNOSIS — R53.82 CHRONIC FATIGUE: ICD-10-CM

## 2022-06-07 DIAGNOSIS — R20.0 NUMBNESS AND TINGLING: ICD-10-CM

## 2022-06-07 DIAGNOSIS — M81.0 AGE-RELATED OSTEOPOROSIS WITHOUT CURRENT PATHOLOGICAL FRACTURE: ICD-10-CM

## 2022-06-07 DIAGNOSIS — M79.641 RIGHT HAND PAIN: ICD-10-CM

## 2022-06-07 DIAGNOSIS — R20.2 NUMBNESS AND TINGLING: ICD-10-CM

## 2022-06-07 DIAGNOSIS — M79.7 FIBROMYALGIA: ICD-10-CM

## 2022-06-07 PROCEDURE — 3074F SYST BP LT 130 MM HG: CPT | Performed by: INTERNAL MEDICINE

## 2022-06-07 PROCEDURE — 3078F DIAST BP <80 MM HG: CPT | Performed by: INTERNAL MEDICINE

## 2022-06-07 PROCEDURE — 3008F BODY MASS INDEX DOCD: CPT | Performed by: INTERNAL MEDICINE

## 2022-06-07 PROCEDURE — 99205 OFFICE O/P NEW HI 60 MIN: CPT | Performed by: INTERNAL MEDICINE

## 2022-06-07 RX ORDER — MULTIVIT-MIN/IRON/FOLIC ACID/K 18-600-40
CAPSULE ORAL
COMMUNITY

## 2022-06-07 RX ORDER — B-COMPLEX WITH VITAMIN C
TABLET ORAL
COMMUNITY

## 2022-06-20 ENCOUNTER — LAB ENCOUNTER (OUTPATIENT)
Dept: LAB | Facility: HOSPITAL | Age: 77
End: 2022-06-20
Attending: INTERNAL MEDICINE
Payer: MEDICARE

## 2022-06-20 ENCOUNTER — HOSPITAL ENCOUNTER (OUTPATIENT)
Dept: GENERAL RADIOLOGY | Facility: HOSPITAL | Age: 77
Discharge: HOME | End: 2022-06-20
Attending: INTERNAL MEDICINE
Payer: MEDICARE

## 2022-06-20 DIAGNOSIS — M25.50 POLYARTHRALGIA: ICD-10-CM

## 2022-06-20 DIAGNOSIS — R53.82 CHRONIC FATIGUE: ICD-10-CM

## 2022-06-20 DIAGNOSIS — M79.641 RIGHT HAND PAIN: ICD-10-CM

## 2022-06-20 DIAGNOSIS — R20.0 NUMBNESS AND TINGLING: ICD-10-CM

## 2022-06-20 DIAGNOSIS — E03.9 ACQUIRED HYPOTHYROIDISM: ICD-10-CM

## 2022-06-20 DIAGNOSIS — R20.2 NUMBNESS AND TINGLING: ICD-10-CM

## 2022-06-20 DIAGNOSIS — E03.9 ACQUIRED HYPOTHYROIDISM: Primary | ICD-10-CM

## 2022-06-20 DIAGNOSIS — M81.0 AGE-RELATED OSTEOPOROSIS WITHOUT CURRENT PATHOLOGICAL FRACTURE: ICD-10-CM

## 2022-06-20 DIAGNOSIS — E55.9 VITAMIN D DEFICIENCY: ICD-10-CM

## 2022-06-20 LAB
ALBUMIN SERPL-MCNC: 4.2 G/DL (ref 3.4–5)
ALBUMIN/GLOB SERPL: 1.2 {RATIO} (ref 1–2)
ALP LIVER SERPL-CCNC: 78 U/L
ALT SERPL-CCNC: 26 U/L
ANION GAP SERPL CALC-SCNC: 3 MMOL/L (ref 0–18)
AST SERPL-CCNC: 25 U/L (ref 15–37)
BASOPHILS # BLD AUTO: 0.07 X10(3) UL (ref 0–0.2)
BASOPHILS NFR BLD AUTO: 1.3 %
BILIRUB SERPL-MCNC: 0.8 MG/DL (ref 0.1–2)
BUN BLD-MCNC: 19 MG/DL (ref 7–18)
CALCIUM BLD-MCNC: 9.7 MG/DL (ref 8.5–10.1)
CHLORIDE SERPL-SCNC: 105 MMOL/L (ref 98–112)
CK SERPL-CCNC: 123 U/L
CO2 SERPL-SCNC: 30 MMOL/L (ref 21–32)
CREAT BLD-MCNC: 0.63 MG/DL
CRP SERPL-MCNC: <0.29 MG/DL (ref ?–0.3)
EOSINOPHIL # BLD AUTO: 0.05 X10(3) UL (ref 0–0.7)
EOSINOPHIL NFR BLD AUTO: 0.9 %
ERYTHROCYTE [DISTWIDTH] IN BLOOD BY AUTOMATED COUNT: 13.8 %
ERYTHROCYTE [SEDIMENTATION RATE] IN BLOOD: 10 MM/HR
FASTING STATUS PATIENT QL REPORTED: NO
GLOBULIN PLAS-MCNC: 3.4 G/DL (ref 2.8–4.4)
GLUCOSE BLD-MCNC: 91 MG/DL (ref 70–99)
HCT VFR BLD AUTO: 42.2 %
HGB BLD-MCNC: 13.8 G/DL
IMM GRANULOCYTES # BLD AUTO: 0.01 X10(3) UL (ref 0–1)
IMM GRANULOCYTES NFR BLD: 0.2 %
LYMPHOCYTES # BLD AUTO: 2.22 X10(3) UL (ref 1–4)
LYMPHOCYTES NFR BLD AUTO: 40.7 %
MAGNESIUM SERPL-MCNC: 2.4 MG/DL (ref 1.6–2.6)
MCH RBC QN AUTO: 30.8 PG (ref 26–34)
MCHC RBC AUTO-ENTMCNC: 32.7 G/DL (ref 31–37)
MCV RBC AUTO: 94.2 FL
MONOCYTES # BLD AUTO: 0.28 X10(3) UL (ref 0.1–1)
MONOCYTES NFR BLD AUTO: 5.1 %
NEUTROPHILS # BLD AUTO: 2.83 X10 (3) UL (ref 1.5–7.7)
NEUTROPHILS # BLD AUTO: 2.83 X10(3) UL (ref 1.5–7.7)
NEUTROPHILS NFR BLD AUTO: 51.8 %
OSMOLALITY SERPL CALC.SUM OF ELEC: 288 MOSM/KG (ref 275–295)
PHOSPHATE SERPL-MCNC: 3.9 MG/DL (ref 2.5–4.9)
PLATELET # BLD AUTO: 230 10(3)UL (ref 150–450)
POTASSIUM SERPL-SCNC: 3.9 MMOL/L (ref 3.5–5.1)
PROT SERPL-MCNC: 7.6 G/DL (ref 6.4–8.2)
RBC # BLD AUTO: 4.48 X10(6)UL
SODIUM SERPL-SCNC: 138 MMOL/L (ref 136–145)
T4 FREE SERPL-MCNC: 1 NG/DL (ref 0.8–1.7)
TSI SER-ACNC: 4.08 MIU/ML (ref 0.36–3.74)
VIT D+METAB SERPL-MCNC: 84.8 NG/ML (ref 30–100)
WBC # BLD AUTO: 5.5 X10(3) UL (ref 4–11)

## 2022-06-20 PROCEDURE — 85652 RBC SED RATE AUTOMATED: CPT | Performed by: INTERNAL MEDICINE

## 2022-06-20 PROCEDURE — 86038 ANTINUCLEAR ANTIBODIES: CPT | Performed by: INTERNAL MEDICINE

## 2022-06-20 PROCEDURE — 82550 ASSAY OF CK (CPK): CPT | Performed by: INTERNAL MEDICINE

## 2022-06-20 PROCEDURE — 86038 ANTINUCLEAR ANTIBODIES: CPT

## 2022-06-20 PROCEDURE — 84100 ASSAY OF PHOSPHORUS: CPT | Performed by: INTERNAL MEDICINE

## 2022-06-20 PROCEDURE — 80053 COMPREHEN METABOLIC PANEL: CPT | Performed by: INTERNAL MEDICINE

## 2022-06-20 PROCEDURE — 73130 X-RAY EXAM OF HAND: CPT | Performed by: INTERNAL MEDICINE

## 2022-06-20 PROCEDURE — 36415 COLL VENOUS BLD VENIPUNCTURE: CPT | Performed by: INTERNAL MEDICINE

## 2022-06-20 PROCEDURE — 84443 ASSAY THYROID STIM HORMONE: CPT

## 2022-06-20 PROCEDURE — 86140 C-REACTIVE PROTEIN: CPT | Performed by: INTERNAL MEDICINE

## 2022-06-20 PROCEDURE — 72052 X-RAY EXAM NECK SPINE 6/>VWS: CPT | Performed by: INTERNAL MEDICINE

## 2022-06-20 PROCEDURE — 83735 ASSAY OF MAGNESIUM: CPT | Performed by: INTERNAL MEDICINE

## 2022-06-20 PROCEDURE — 84439 ASSAY OF FREE THYROXINE: CPT

## 2022-06-20 PROCEDURE — 85025 COMPLETE CBC W/AUTO DIFF WBC: CPT | Performed by: INTERNAL MEDICINE

## 2022-06-20 PROCEDURE — 82306 VITAMIN D 25 HYDROXY: CPT

## 2022-06-20 RX ORDER — LEVOTHYROXINE SODIUM 0.05 MG/1
50 TABLET ORAL
Qty: 90 TABLET | Refills: 0 | Status: SHIPPED | OUTPATIENT
Start: 2022-06-20

## 2022-06-21 ENCOUNTER — TELEMEDICINE (OUTPATIENT)
Dept: RHEUMATOLOGY | Facility: CLINIC | Age: 77
End: 2022-06-21
Payer: MEDICARE

## 2022-06-21 VITALS — HEIGHT: 57 IN | BODY MASS INDEX: 21.57 KG/M2 | WEIGHT: 100 LBS

## 2022-06-21 DIAGNOSIS — M15.9 PRIMARY OSTEOARTHRITIS INVOLVING MULTIPLE JOINTS: ICD-10-CM

## 2022-06-21 DIAGNOSIS — M25.50 POLYARTHRALGIA: ICD-10-CM

## 2022-06-21 DIAGNOSIS — M50.30 DDD (DEGENERATIVE DISC DISEASE), CERVICAL: ICD-10-CM

## 2022-06-21 DIAGNOSIS — M79.7 FIBROMYALGIA: Primary | ICD-10-CM

## 2022-06-21 DIAGNOSIS — R53.82 CHRONIC FATIGUE: ICD-10-CM

## 2022-06-21 DIAGNOSIS — R20.0 NUMBNESS AND TINGLING: ICD-10-CM

## 2022-06-21 DIAGNOSIS — R20.2 NUMBNESS AND TINGLING: ICD-10-CM

## 2022-06-21 DIAGNOSIS — M81.0 AGE-RELATED OSTEOPOROSIS WITHOUT CURRENT PATHOLOGICAL FRACTURE: ICD-10-CM

## 2022-06-21 PROCEDURE — 3008F BODY MASS INDEX DOCD: CPT | Performed by: INTERNAL MEDICINE

## 2022-06-21 PROCEDURE — 99214 OFFICE O/P EST MOD 30 MIN: CPT | Performed by: INTERNAL MEDICINE

## 2022-06-23 LAB — ANA SER QL: NEGATIVE

## 2022-07-18 ENCOUNTER — TELEPHONE (OUTPATIENT)
Dept: RHEUMATOLOGY | Facility: CLINIC | Age: 77
End: 2022-07-18

## 2022-07-29 ENCOUNTER — APPOINTMENT (OUTPATIENT)
Dept: GENERAL RADIOLOGY | Age: 77
End: 2022-07-29
Attending: PHYSICIAN ASSISTANT
Payer: MEDICARE

## 2022-07-29 ENCOUNTER — TELEPHONE (OUTPATIENT)
Dept: INTERNAL MEDICINE CLINIC | Facility: CLINIC | Age: 77
End: 2022-07-29

## 2022-07-29 ENCOUNTER — HOSPITAL ENCOUNTER (OUTPATIENT)
Age: 77
Discharge: HOME | End: 2022-07-29
Payer: MEDICARE

## 2022-07-29 VITALS
BODY MASS INDEX: 20.16 KG/M2 | TEMPERATURE: 98 F | OXYGEN SATURATION: 99 % | HEIGHT: 59 IN | DIASTOLIC BLOOD PRESSURE: 57 MMHG | HEART RATE: 81 BPM | RESPIRATION RATE: 18 BRPM | WEIGHT: 100 LBS | SYSTOLIC BLOOD PRESSURE: 131 MMHG

## 2022-07-29 DIAGNOSIS — S80.11XA CONTUSION OF LOWER EXTREMITY, RIGHT, INITIAL ENCOUNTER: ICD-10-CM

## 2022-07-29 DIAGNOSIS — S60.221A CONTUSION OF RIGHT HAND, INITIAL ENCOUNTER: ICD-10-CM

## 2022-07-29 DIAGNOSIS — S60.222A CONTUSION OF LEFT HAND, INITIAL ENCOUNTER: ICD-10-CM
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
## 2022-07-29 PROCEDURE — 73130 X-RAY EXAM OF HAND: CPT | Performed by: PHYSICIAN ASSISTANT

## 2022-07-29 PROCEDURE — 99213 OFFICE O/P EST LOW 20 MIN: CPT

## 2022-07-29 PROCEDURE — 73590 X-RAY EXAM OF LOWER LEG: CPT | Performed by: PHYSICIAN ASSISTANT

## 2022-07-29 PROCEDURE — 73560 X-RAY EXAM OF KNEE 1 OR 2: CPT | Performed by: PHYSICIAN ASSISTANT

## 2022-07-29 PROCEDURE — 99214 OFFICE O/P EST MOD 30 MIN: CPT

## 2022-07-29 RX ORDER — IBUPROFEN 200 MG
400 TABLET ORAL ONCE
Status: COMPLETED | OUTPATIENT
Start: 2022-07-29 | End: 2022-07-29

## 2022-07-29 RX ORDER — ACETAMINOPHEN 325 MG/1
650 TABLET ORAL ONCE
Status: DISCONTINUED | OUTPATIENT
Start: 2022-07-29 | End: 2022-07-29

## 2022-07-29 NOTE — TELEPHONE ENCOUNTER
Pt paged the on call about a discuss she had with Dr. Lexa Ribeiro yesterday. Pt fell and has knee and leg pain, HA, lethargy as a result. Pt does not think she hit her head, she was alone when it happened. No LOC. Hard to put on her shoes related to back pain and having difficulty walking due to pain. Pt states Dr. Lexa Ribeiro told her yesterday that if she was not feeling better she should call the office. I advised pt to go to  for further evaluation.

## 2022-07-29 NOTE — TELEPHONE ENCOUNTER
Pt states she fell yesterday and has quite a few injuries.  She stated that she spoke with AD about this yesterday (I dont see a note) and was advised to call today if she is not better     Please advise

## 2022-07-29 NOTE — ED INITIAL ASSESSMENT (HPI)
Pt sts that she tripped walking yesterday. Fell onto bilateral knees and hands. Pain and Bruising to bilateral hands, pain and abrasion to r knee, right upper pain, left knee pain. Denies hitting head.

## 2022-08-01 DIAGNOSIS — E03.9 ACQUIRED HYPOTHYROIDISM: ICD-10-CM

## 2022-08-01 RX ORDER — LEVOTHYROXINE SODIUM 0.03 MG/1
TABLET ORAL
Qty: 90 TABLET | Refills: 0 | Status: SHIPPED | OUTPATIENT
Start: 2022-08-01

## 2022-08-12 ENCOUNTER — OFFICE VISIT (OUTPATIENT)
Dept: INTERNAL MEDICINE CLINIC | Facility: CLINIC | Age: 77
End: 2022-08-12
Payer: MEDICARE

## 2022-08-12 ENCOUNTER — MED REC SCAN ONLY (OUTPATIENT)
Dept: INTERNAL MEDICINE CLINIC | Facility: CLINIC | Age: 77
End: 2022-08-12

## 2022-08-12 VITALS
TEMPERATURE: 98 F | RESPIRATION RATE: 18 BRPM | HEIGHT: 59 IN | WEIGHT: 103 LBS | SYSTOLIC BLOOD PRESSURE: 120 MMHG | HEART RATE: 72 BPM | BODY MASS INDEX: 20.76 KG/M2 | DIASTOLIC BLOOD PRESSURE: 62 MMHG

## 2022-08-12 DIAGNOSIS — G89.29 CHRONIC NECK PAIN: ICD-10-CM

## 2022-08-12 DIAGNOSIS — M54.2 CHRONIC NECK PAIN: ICD-10-CM

## 2022-08-12 DIAGNOSIS — S80.01XA CONTUSION OF RIGHT KNEE, INITIAL ENCOUNTER: ICD-10-CM
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
## 2022-08-12 DIAGNOSIS — S60.229A CONTUSION OF HAND, UNSPECIFIED LATERALITY, INITIAL ENCOUNTER: ICD-10-CM

## 2022-08-12 DIAGNOSIS — M79.7 FIBROMYALGIA: ICD-10-CM

## 2022-08-12 DIAGNOSIS — M19.042 OSTEOARTHRITIS OF BOTH HANDS, UNSPECIFIED OSTEOARTHRITIS TYPE: ICD-10-CM

## 2022-08-12 DIAGNOSIS — M19.041 OSTEOARTHRITIS OF BOTH HANDS, UNSPECIFIED OSTEOARTHRITIS TYPE: ICD-10-CM

## 2022-08-18 ENCOUNTER — APPOINTMENT (OUTPATIENT)
Dept: GENERAL RADIOLOGY | Facility: HOSPITAL | Age: 77
End: 2022-08-18
Attending: EMERGENCY MEDICINE
Payer: MEDICARE

## 2022-08-18 ENCOUNTER — HOSPITAL ENCOUNTER (EMERGENCY)
Facility: HOSPITAL | Age: 77
Discharge: HOME | End: 2022-08-18
Attending: EMERGENCY MEDICINE
Payer: MEDICARE

## 2022-08-18 VITALS
BODY MASS INDEX: 21 KG/M2 | WEIGHT: 102 LBS | OXYGEN SATURATION: 98 % | SYSTOLIC BLOOD PRESSURE: 147 MMHG | RESPIRATION RATE: 22 BRPM | TEMPERATURE: 97 F | DIASTOLIC BLOOD PRESSURE: 84 MMHG | HEART RATE: 87 BPM

## 2022-08-18 DIAGNOSIS — M25.511 ACUTE PAIN OF RIGHT SHOULDER: Primary | ICD-10-CM

## 2022-08-18 PROCEDURE — 73030 X-RAY EXAM OF SHOULDER: CPT | Performed by: EMERGENCY MEDICINE

## 2022-08-18 PROCEDURE — 99284 EMERGENCY DEPT VISIT MOD MDM: CPT | Performed by: EMERGENCY MEDICINE

## 2022-08-18 PROCEDURE — 72050 X-RAY EXAM NECK SPINE 4/5VWS: CPT | Performed by: EMERGENCY MEDICINE

## 2022-08-18 RX ORDER — ACETAMINOPHEN 500 MG
1000 TABLET ORAL ONCE
Status: COMPLETED | OUTPATIENT
Start: 2022-08-18 | End: 2022-08-18

## 2022-08-18 RX ORDER — IBUPROFEN 600 MG/1
600 TABLET ORAL ONCE
Status: COMPLETED | OUTPATIENT
Start: 2022-08-18 | End: 2022-08-18

## 2022-08-18 NOTE — ED INITIAL ASSESSMENT (HPI)
77YF c/c of R arm pain pt state that she fall on 7/28 Pt state that she been having R arm pain since Pt state that she here today due to increased pain

## 2022-08-22 ENCOUNTER — TELEPHONE (OUTPATIENT)
Dept: ADMINISTRATIVE | Age: 77
End: 2022-08-22

## 2022-08-22 DIAGNOSIS — M79.641 HAND PAIN, RIGHT: ICD-10-CM

## 2022-08-22 DIAGNOSIS — M25.511 CHRONIC RIGHT SHOULDER PAIN: Primary | ICD-10-CM

## 2022-08-22 DIAGNOSIS — M54.2 NECK PAIN: ICD-10-CM

## 2022-08-22 DIAGNOSIS — G89.29 CHRONIC RIGHT SHOULDER PAIN: Primary | ICD-10-CM

## 2022-08-23 NOTE — TELEPHONE ENCOUNTER
Pt calling to check status on her referral.  I advised it's still pending, pt would like a call back when it authorized as she is in pain.

## 2022-08-23 NOTE — TELEPHONE ENCOUNTER
Patient states she went to the ER on 8/18/22 due to fall on knees and hands, she states she was in so much pain right shoulder, neck, sometimes left arm as well, states she sometimes has stabbing pain in her right shoulder, position related, constant pain in her neck, pain rated a 6-7, right hand /fingers tingling, alternating Tylenol/ Motrin which only helps an little bit. Patient states today she is weak, tired and short of breath, blood pressure yesterday was 78/43, BP today after activity is 126/81, heard shortness of breath over the phone. Pt notified she will need to go to the ER for evaluation. Pt verbalizes understanding. Masood MEDRANO RN took information. Pt states she had a bad experience at the ER last time and didn't want to go back but this RN advised and verbalizes understanding she will need to go.   RAMSES to CHRISTINE

## 2022-08-24 ENCOUNTER — TELEPHONE (OUTPATIENT)
Dept: INTERNAL MEDICINE CLINIC | Facility: CLINIC | Age: 77
End: 2022-08-24

## 2022-08-24 NOTE — TELEPHONE ENCOUNTER
Spoke to pt. Home Health order is not placed- unsure why they would call about that. Pt unsure also. Suggested maybe it was for PT or ortho, per our referrals that are placed. She agrees that maybe it was ortho. Gave phone number to ortho, pt will call to schedule.

## 2022-08-24 NOTE — TELEPHONE ENCOUNTER
Pt stated she received a call from her ins company today and they informed her ins will cover home health services. Pt is confused she is not sure what that means. She is calling to speak to nurse Karen Quintana pt stated because \"Linda knows\"  She was recently in the ER. I offered to schedule an ER follow up to further discuss with the provider. Pt declined stating no one told her a follow up is needed.

## 2022-08-30 ENCOUNTER — TELEPHONE (OUTPATIENT)
Dept: INTERNAL MEDICINE CLINIC | Facility: CLINIC | Age: 77
End: 2022-08-30

## 2022-08-30 ENCOUNTER — HOSPITAL ENCOUNTER (EMERGENCY)
Facility: HOSPITAL | Age: 77
Discharge: HOME | End: 2022-08-30
Attending: EMERGENCY MEDICINE
Payer: MEDICARE

## 2022-08-30 VITALS
DIASTOLIC BLOOD PRESSURE: 48 MMHG | TEMPERATURE: 98 F | HEIGHT: 59 IN | SYSTOLIC BLOOD PRESSURE: 108 MMHG | WEIGHT: 102 LBS | OXYGEN SATURATION: 100 % | HEART RATE: 82 BPM | BODY MASS INDEX: 20.56 KG/M2 | RESPIRATION RATE: 18 BRPM

## 2022-08-30 DIAGNOSIS — R11.10 VOMITING, UNSPECIFIED VOMITING TYPE, UNSPECIFIED WHETHER NAUSEA PRESENT: Primary | ICD-10-CM

## 2022-08-30 LAB
ALBUMIN SERPL-MCNC: 4 G/DL (ref 3.4–5)
ALBUMIN/GLOB SERPL: 1.1 {RATIO} (ref 1–2)
ALP LIVER SERPL-CCNC: 77 U/L
ALT SERPL-CCNC: 23 U/L
ANION GAP SERPL CALC-SCNC: 4 MMOL/L (ref 0–18)
AST SERPL-CCNC: 20 U/L (ref 15–37)
BASOPHILS # BLD AUTO: 0.02 X10(3) UL (ref 0–0.2)
BASOPHILS NFR BLD AUTO: 0.3 %
BILIRUB SERPL-MCNC: 1 MG/DL (ref 0.1–2)
BUN BLD-MCNC: 22 MG/DL (ref 7–18)
CALCIUM BLD-MCNC: 9.5 MG/DL (ref 8.5–10.1)
CHLORIDE SERPL-SCNC: 105 MMOL/L (ref 98–112)
CO2 SERPL-SCNC: 28 MMOL/L (ref 21–32)
CREAT BLD-MCNC: 0.69 MG/DL
EOSINOPHIL # BLD AUTO: 0.02 X10(3) UL (ref 0–0.7)
EOSINOPHIL NFR BLD AUTO: 0.3 %
ERYTHROCYTE [DISTWIDTH] IN BLOOD BY AUTOMATED COUNT: 13.6 %
GFR SERPLBLD BASED ON 1.73 SQ M-ARVRAT: 88 ML/MIN/1.73M2 (ref 60–?)
GLOBULIN PLAS-MCNC: 3.7 G/DL (ref 2.8–4.4)
GLUCOSE BLD-MCNC: 96 MG/DL (ref 70–99)
HCT VFR BLD AUTO: 42.2 %
HGB BLD-MCNC: 14 G/DL
IMM GRANULOCYTES # BLD AUTO: 0.01 X10(3) UL (ref 0–1)
IMM GRANULOCYTES NFR BLD: 0.1 %
LYMPHOCYTES # BLD AUTO: 0.81 X10(3) UL (ref 1–4)
LYMPHOCYTES NFR BLD AUTO: 10.9 %
MCH RBC QN AUTO: 31 PG (ref 26–34)
MCHC RBC AUTO-ENTMCNC: 33.2 G/DL (ref 31–37)
MCV RBC AUTO: 93.6 FL
MONOCYTES # BLD AUTO: 0.18 X10(3) UL (ref 0.1–1)
MONOCYTES NFR BLD AUTO: 2.4 %
NEUTROPHILS # BLD AUTO: 6.4 X10 (3) UL (ref 1.5–7.7)
NEUTROPHILS # BLD AUTO: 6.4 X10(3) UL (ref 1.5–7.7)
NEUTROPHILS NFR BLD AUTO: 86 %
OSMOLALITY SERPL CALC.SUM OF ELEC: 287 MOSM/KG (ref 275–295)
PLATELET # BLD AUTO: 226 10(3)UL (ref 150–450)
POTASSIUM SERPL-SCNC: 3.9 MMOL/L (ref 3.5–5.1)
PROT SERPL-MCNC: 7.7 G/DL (ref 6.4–8.2)
RBC # BLD AUTO: 4.51 X10(6)UL
SODIUM SERPL-SCNC: 137 MMOL/L (ref 136–145)
WBC # BLD AUTO: 7.4 X10(3) UL (ref 4–11)

## 2022-08-30 PROCEDURE — 80053 COMPREHEN METABOLIC PANEL: CPT | Performed by: EMERGENCY MEDICINE

## 2022-08-30 PROCEDURE — 96361 HYDRATE IV INFUSION ADD-ON: CPT

## 2022-08-30 PROCEDURE — 99285 EMERGENCY DEPT VISIT HI MDM: CPT

## 2022-08-30 PROCEDURE — 99284 EMERGENCY DEPT VISIT MOD MDM: CPT

## 2022-08-30 PROCEDURE — 96374 THER/PROPH/DIAG INJ IV PUSH: CPT

## 2022-08-30 PROCEDURE — 85025 COMPLETE CBC W/AUTO DIFF WBC: CPT | Performed by: EMERGENCY MEDICINE

## 2022-08-30 RX ORDER — PANTOPRAZOLE SODIUM 40 MG/1
40 TABLET, DELAYED RELEASE ORAL DAILY
Qty: 30 TABLET | Refills: 0 | Status: SHIPPED | OUTPATIENT
Start: 2022-08-30 | End: 2022-09-29

## 2022-08-30 RX ORDER — ONDANSETRON 2 MG/ML
4 INJECTION INTRAMUSCULAR; INTRAVENOUS ONCE
Status: COMPLETED | OUTPATIENT
Start: 2022-08-30 | End: 2022-08-30

## 2022-08-30 NOTE — TELEPHONE ENCOUNTER
pt calling stating lastnight she was nausea/vomited 3 times and was red liquid-she said she did not eat anything red

## 2022-08-30 NOTE — TELEPHONE ENCOUNTER
Spoke to pt. Pt said that last night, she was vomiting bright red. Denies eating or drinking anything red that may have caused this. Vomited 3x, all were bright red. Pt has not vomited in a few hours, but is still not feeling well. Advised to be seen in ED for eval. Pt stated understanding and agreed to plan. HEIDII to CHRISTINE.

## 2022-08-30 NOTE — ED INITIAL ASSESSMENT (HPI)
Pt arrives to ER from home c/o vomiting x3 lastnight. States it was watery and bright red. Also had lower abd pain. No c/o diarrhea. No urinary symptoms.

## 2022-08-31 NOTE — CM/SW NOTE
EDCM received call from patient requesting assistance with pcp appointment. Spoke with Dr. Tono Odonnell office and scheduled patient appointment for :     Dr. Tono Odonnell  09/02/21 @ 0940am  [address]   [address] [place] [address] Drive      Called patient @ [phone] and informed of appointment.

## 2022-09-02 ENCOUNTER — OFFICE VISIT (OUTPATIENT)
Dept: INTERNAL MEDICINE CLINIC | Facility: CLINIC | Age: 77
End: 2022-09-02
Payer: MEDICARE

## 2022-09-02 VITALS
WEIGHT: 99.63 LBS | TEMPERATURE: 97 F | HEART RATE: 88 BPM | HEIGHT: 59 IN | RESPIRATION RATE: 18 BRPM | SYSTOLIC BLOOD PRESSURE: 106 MMHG | OXYGEN SATURATION: 96 % | DIASTOLIC BLOOD PRESSURE: 64 MMHG | BODY MASS INDEX: 20.08 KG/M2

## 2022-09-02 DIAGNOSIS — R11.2 NAUSEA AND VOMITING, UNSPECIFIED VOMITING TYPE: ICD-10-CM

## 2022-09-02 DIAGNOSIS — E03.9 ACQUIRED HYPOTHYROIDISM: Primary | ICD-10-CM

## 2022-09-02 DIAGNOSIS — M79.7 FIBROMYALGIA: ICD-10-CM

## 2022-09-02 DIAGNOSIS — M25.521 RIGHT ELBOW PAIN: ICD-10-CM

## 2022-09-02 PROCEDURE — 3078F DIAST BP <80 MM HG: CPT | Performed by: INTERNAL MEDICINE

## 2022-09-02 PROCEDURE — 3074F SYST BP LT 130 MM HG: CPT | Performed by: INTERNAL MEDICINE

## 2022-09-02 PROCEDURE — 3008F BODY MASS INDEX DOCD: CPT | Performed by: INTERNAL MEDICINE

## 2022-09-02 PROCEDURE — 99214 OFFICE O/P EST MOD 30 MIN: CPT | Performed by: INTERNAL MEDICINE

## 2022-09-07 ENCOUNTER — TELEPHONE (OUTPATIENT)
Dept: ORTHOPEDICS CLINIC | Facility: CLINIC | Age: 77
End: 2022-09-07

## 2022-09-09 ENCOUNTER — TELEPHONE (OUTPATIENT)
Dept: INTERNAL MEDICINE CLINIC | Facility: CLINIC | Age: 77
End: 2022-09-09

## 2022-09-09 NOTE — TELEPHONE ENCOUNTER
Pt said Dr. Kashmir Eid was going to give her name of  and also CHELSEY HANKS to see-she does not have any names and she will also then need a referral for her [address] as well.

## 2022-09-09 NOTE — TELEPHONE ENCOUNTER
Can we facilitate housing accommodations for the patient? Or please let me know of the next steps.  Thank you

## 2022-09-13 ENCOUNTER — OFFICE VISIT (OUTPATIENT)
Dept: ORTHOPEDICS CLINIC | Facility: CLINIC | Age: 77
End: 2022-09-13
Payer: MEDICARE

## 2022-09-13 VITALS — WEIGHT: 100 LBS | BODY MASS INDEX: 20.16 KG/M2 | HEIGHT: 59 IN

## 2022-09-13 DIAGNOSIS — M65.20 CALCIFIC TENDINITIS: ICD-10-CM

## 2022-09-13 DIAGNOSIS — M75.01 ADHESIVE CAPSULITIS OF RIGHT SHOULDER: Primary | ICD-10-CM

## 2022-09-13 PROCEDURE — 99203 OFFICE O/P NEW LOW 30 MIN: CPT | Performed by: PHYSICIAN ASSISTANT

## 2022-09-13 PROCEDURE — 3008F BODY MASS INDEX DOCD: CPT | Performed by: PHYSICIAN ASSISTANT

## 2022-09-13 PROCEDURE — 1125F AMNT PAIN NOTED PAIN PRSNT: CPT | Performed by: PHYSICIAN ASSISTANT

## 2022-09-13 NOTE — TELEPHONE ENCOUNTER
Tried calling pt several times but her line was busy. Trying to give her the phone number for [place] [phone] regarding her housing issues. I will send her a my chart message.

## 2022-09-14 ENCOUNTER — TELEPHONE (OUTPATIENT)
Dept: INTERNAL MEDICINE CLINIC | Facility: CLINIC | Age: 77
End: 2022-09-14

## 2022-09-14 DIAGNOSIS — M81.0 AGE-RELATED OSTEOPOROSIS WITHOUT CURRENT PATHOLOGICAL FRACTURE: Primary | ICD-10-CM

## 2022-09-14 NOTE — TELEPHONE ENCOUNTER
Specialty:   rheumatology    Full Name of Specialist:  Yesy Cotton  Name of the Provider Group:  Address:  Phone number:  Fax number :  NPI:    (NPI number of the service provider. the nurses need this information for the referral.  Its for service providers only like [employer], [facility], Etc.   We not NOT need physician NPI's)    Date of Appointment:  10/21/22    Reason for the Appointment (be specific with diagnosis code): 6 mo fup/osteoporosis/arthralgia    Has the patient seen a provider in our office for stated problem?: yes    Is this request for an out of network referral? no  (if yes, please have patient contact referring provider and have them fax office visit notes to triage attention):

## 2022-09-16 ENCOUNTER — TELEPHONE (OUTPATIENT)
Dept: INTERNAL MEDICINE CLINIC | Facility: CLINIC | Age: 77
End: 2022-09-16

## 2022-09-16 DIAGNOSIS — Z12.11 SCREEN FOR COLON CANCER: Primary | ICD-10-CM

## 2022-09-16 NOTE — TELEPHONE ENCOUNTER
LOV 9/2/22 with AD. Colorectal Screening is due 1/2023. Pt asking for referral to Dr Octaviano Ivan MD.  OK to order?

## 2022-09-22 ENCOUNTER — TELEPHONE (OUTPATIENT)
Dept: INTERNAL MEDICINE CLINIC | Facility: CLINIC | Age: 77
End: 2022-09-22

## 2022-09-22 NOTE — TELEPHONE ENCOUNTER
Patient notified Judythe Bos[place] Dr. Valinda Brasher out of network she will call with other providers covered for referral to be updated.

## 2022-09-22 NOTE — TELEPHONE ENCOUNTER
HR recently ok  HR was occ rising up for no apparent reason and then self corrects   ? POTS (also has orthostatic hypotension)  On Coreg  Pt drinking enough water  Sodium levels ok  Note: now on Lasix (7/1)  Monitor   Mohan Glover [facility] Clinical Staff  Good Morning     The Rendering Provider is out of network with the patient's insurance plan. I am unable to process this request at this time. Please update referral with an in network Rendering Provider and I will be happy to reprocess this referral request.     Thank you,   Tiffanie Bowens- sub GI out of network patient to contact insurance for in network GI for referral to be updated.

## 2022-11-10 NOTE — TELEPHONE ENCOUNTER
Pt has not called back. [address] sent to pt asking to check with insurance for covered provider.

## 2022-12-06 NOTE — TELEPHONE ENCOUNTER
Per [facility] message, \"Pt stated her issue resolved and she doesn't need a referral at this time\".

## 2022-12-13 ENCOUNTER — TELEPHONE (OUTPATIENT)
Dept: INTERNAL MEDICINE CLINIC | Facility: CLINIC | Age: 77
End: 2022-12-13

## 2022-12-13 DIAGNOSIS — E03.9 ACQUIRED HYPOTHYROIDISM: ICD-10-CM

## 2022-12-13 DIAGNOSIS — Z00.00 ROUTINE GENERAL MEDICAL EXAMINATION AT A HEALTH CARE FACILITY: Primary | ICD-10-CM

## 2022-12-13 DIAGNOSIS — Z13.0 SCREENING FOR DISORDER OF BLOOD AND BLOOD-FORMING ORGANS: ICD-10-CM

## 2022-12-13 NOTE — TELEPHONE ENCOUNTER
Informed must fast no call back required.  Orders to [facility].    Future Appointments   Date Time Provider Alexa Mahmood   3/13/2023 11:20 AM Tori Christie MD [facility]

## 2023-01-03 NOTE — TELEPHONE ENCOUNTER
Pt scheduled earlier supervisit.  Please place appropriate orders to [facility]    Future Appointments   Date Time Provider Alexa Mahmood   2/13/2023 11:20 AM Aleisha Fish MD [facility]

## 2023-01-06 LAB
T4, FREE: 1 NG/DL (ref 0.8–1.8)
TSH W/REFLEX TO FT4: 6.17 MIU/L (ref 0.4–4.5)

## 2023-01-09 ENCOUNTER — OFFICE VISIT (OUTPATIENT)
Dept: INTERNAL MEDICINE CLINIC | Facility: CLINIC | Age: 78
End: 2023-01-09
Payer: MEDICARE

## 2023-01-09 VITALS
TEMPERATURE: 98 F | RESPIRATION RATE: 16 BRPM | SYSTOLIC BLOOD PRESSURE: 104 MMHG | HEART RATE: 88 BPM | WEIGHT: 102 LBS | DIASTOLIC BLOOD PRESSURE: 60 MMHG | BODY MASS INDEX: 20.56 KG/M2 | HEIGHT: 59 IN

## 2023-01-09 DIAGNOSIS — R45.89 DEPRESSED MOOD: ICD-10-CM

## 2023-01-09 DIAGNOSIS — E03.9 ACQUIRED HYPOTHYROIDISM: Primary | ICD-10-CM

## 2023-01-09 DIAGNOSIS — M79.7 FIBROMYALGIA: ICD-10-CM

## 2023-01-09 PROCEDURE — 3074F SYST BP LT 130 MM HG: CPT | Performed by: INTERNAL MEDICINE

## 2023-01-09 PROCEDURE — 3078F DIAST BP <80 MM HG: CPT | Performed by: INTERNAL MEDICINE

## 2023-01-09 PROCEDURE — 99214 OFFICE O/P EST MOD 30 MIN: CPT | Performed by: INTERNAL MEDICINE

## 2023-01-09 PROCEDURE — 3008F BODY MASS INDEX DOCD: CPT | Performed by: INTERNAL MEDICINE

## 2023-01-09 RX ORDER — LEVOTHYROXINE SODIUM 0.03 MG/1
25 TABLET ORAL
Qty: 90 TABLET | Refills: 1 | Status: SHIPPED | OUTPATIENT
Start: 2023-01-09

## 2023-02-06 ENCOUNTER — TELEPHONE (OUTPATIENT)
Dept: INTERNAL MEDICINE CLINIC | Facility: CLINIC | Age: 78
End: 2023-02-06

## 2023-02-06 NOTE — TELEPHONE ENCOUNTER
Orders to THE [facility] Pt aware to get labs done no sooner than 2 weeks prior to the appt.   Pt aware to fast.  No call back required.'  Future Appointments   Date Time Provider Alexa Ela   3/27/2023  1:40 PM Micheal Meléndez MD [facility]

## 2023-03-27 ENCOUNTER — OFFICE VISIT (OUTPATIENT)
Dept: INTERNAL MEDICINE CLINIC | Facility: CLINIC | Age: 78
End: 2023-03-27
Payer: MEDICARE

## 2023-03-27 VITALS
BODY MASS INDEX: 21.37 KG/M2 | WEIGHT: 106 LBS | HEART RATE: 82 BPM | HEIGHT: 59 IN | DIASTOLIC BLOOD PRESSURE: 52 MMHG | TEMPERATURE: 98 F | SYSTOLIC BLOOD PRESSURE: 106 MMHG

## 2023-03-27 DIAGNOSIS — M81.0 AGE-RELATED OSTEOPOROSIS WITHOUT CURRENT PATHOLOGICAL FRACTURE: ICD-10-CM

## 2023-03-27 DIAGNOSIS — I73.00 RAYNAUD'S DISEASE WITHOUT GANGRENE: ICD-10-CM

## 2023-03-27 DIAGNOSIS — R45.89 DEPRESSED MOOD: ICD-10-CM

## 2023-03-27 DIAGNOSIS — M79.7 FIBROMYALGIA: ICD-10-CM

## 2023-03-27 DIAGNOSIS — M19.042 OSTEOARTHRITIS OF BOTH HANDS, UNSPECIFIED OSTEOARTHRITIS TYPE: ICD-10-CM

## 2023-03-27 DIAGNOSIS — G31.84 MCI (MILD COGNITIVE IMPAIRMENT): ICD-10-CM

## 2023-03-27 DIAGNOSIS — Z86.19 HISTORY OF LYME DISEASE: ICD-10-CM

## 2023-03-27 DIAGNOSIS — F32.9 REACTIVE DEPRESSION: ICD-10-CM

## 2023-03-27 DIAGNOSIS — E03.9 ACQUIRED HYPOTHYROIDISM: ICD-10-CM

## 2023-03-27 DIAGNOSIS — Z00.00 ENCOUNTER FOR ANNUAL HEALTH EXAMINATION: Primary | ICD-10-CM

## 2023-03-27 DIAGNOSIS — M19.041 OSTEOARTHRITIS OF BOTH HANDS, UNSPECIFIED OSTEOARTHRITIS TYPE: ICD-10-CM

## 2023-03-27 PROBLEM — R14.2 BURPING: Status: RESOLVED | Noted: 2021-10-07 | Resolved: 2023-03-27

## 2023-03-27 PROBLEM — R13.19 ESOPHAGEAL DYSPHAGIA: Status: RESOLVED | Noted: 2021-10-07 | Resolved: 2023-03-27

## 2023-03-27 PROBLEM — M79.2 NEURALGIA INVOLVING SCALP: Status: RESOLVED | Noted: 2020-12-03 | Resolved: 2023-03-27

## 2023-03-27 PROBLEM — R07.89 ATYPICAL CHEST PAIN: Status: RESOLVED | Noted: 2022-03-10 | Resolved: 2023-03-27

## 2023-03-27 PROBLEM — M54.50 CHRONIC BILATERAL LOW BACK PAIN WITHOUT SCIATICA: Status: RESOLVED | Noted: 2019-05-03 | Resolved: 2023-03-27

## 2023-03-27 PROBLEM — R11.0 NAUSEA: Status: RESOLVED | Noted: 2021-10-07 | Resolved: 2023-03-27

## 2023-03-27 PROBLEM — R19.8 IRREGULAR BOWEL HABITS: Status: RESOLVED | Noted: 2021-10-07 | Resolved: 2023-03-27

## 2023-03-27 PROBLEM — G89.29 CHRONIC BILATERAL LOW BACK PAIN WITHOUT SCIATICA: Status: RESOLVED | Noted: 2019-05-03 | Resolved: 2023-03-27

## 2023-03-27 PROBLEM — K59.09 CHRONIC CONSTIPATION: Status: RESOLVED | Noted: 2021-05-19 | Resolved: 2023-03-27

## 2023-05-24 ENCOUNTER — HOSPITAL ENCOUNTER (EMERGENCY)
Facility: HOSPITAL | Age: 78
Discharge: HOME | End: 2023-05-24
Attending: EMERGENCY MEDICINE
Payer: MEDICARE

## 2023-05-24 ENCOUNTER — APPOINTMENT (OUTPATIENT)
Dept: GENERAL RADIOLOGY | Facility: HOSPITAL | Age: 78
End: 2023-05-24
Attending: EMERGENCY MEDICINE
Payer: MEDICARE

## 2023-05-24 VITALS
DIASTOLIC BLOOD PRESSURE: 51 MMHG | RESPIRATION RATE: 20 BRPM | WEIGHT: 98.63 LBS | BODY MASS INDEX: 14.61 KG/M2 | HEIGHT: 69 IN | HEART RATE: 67 BPM | TEMPERATURE: 98 F | SYSTOLIC BLOOD PRESSURE: 106 MMHG | OXYGEN SATURATION: 99 %

## 2023-05-24 DIAGNOSIS — R06.00 DYSPNEA, UNSPECIFIED TYPE: Primary | ICD-10-CM

## 2023-05-24 LAB
ALBUMIN SERPL-MCNC: 3.9 G/DL (ref 3.4–5)
ALBUMIN/GLOB SERPL: 1.2 {RATIO} (ref 1–2)
ALP LIVER SERPL-CCNC: 78 U/L
ALT SERPL-CCNC: 21 U/L
ANION GAP SERPL CALC-SCNC: 5 MMOL/L (ref 0–18)
AST SERPL-CCNC: 13 U/L (ref 15–37)
ATRIAL RATE: 66 BPM
BASOPHILS # BLD AUTO: 0.05 X10(3) UL (ref 0–0.2)
BASOPHILS NFR BLD AUTO: 0.8 %
BILIRUB SERPL-MCNC: 0.4 MG/DL (ref 0.1–2)
BUN BLD-MCNC: 20 MG/DL (ref 7–18)
CALCIUM BLD-MCNC: 9.3 MG/DL (ref 8.5–10.1)
CHLORIDE SERPL-SCNC: 108 MMOL/L (ref 98–112)
CO2 SERPL-SCNC: 27 MMOL/L (ref 21–32)
CREAT BLD-MCNC: 0.59 MG/DL
D DIMER PPP FEU-MCNC: <0.27 UG/ML FEU (ref ?–0.8)
EOSINOPHIL # BLD AUTO: 0.05 X10(3) UL (ref 0–0.7)
EOSINOPHIL NFR BLD AUTO: 0.8 %
ERYTHROCYTE [DISTWIDTH] IN BLOOD BY AUTOMATED COUNT: 13.3 %
GFR SERPLBLD BASED ON 1.73 SQ M-ARVRAT: 91 ML/MIN/1.73M2 (ref 60–?)
GLOBULIN PLAS-MCNC: 3.3 G/DL (ref 2.8–4.4)
GLUCOSE BLD-MCNC: 114 MG/DL (ref 70–99)
HCT VFR BLD AUTO: 39.6 %
HGB BLD-MCNC: 13.2 G/DL
IMM GRANULOCYTES # BLD AUTO: 0.01 X10(3) UL (ref 0–1)
IMM GRANULOCYTES NFR BLD: 0.2 %
LYMPHOCYTES # BLD AUTO: 2.68 X10(3) UL (ref 1–4)
LYMPHOCYTES NFR BLD AUTO: 44.7 %
MCH RBC QN AUTO: 30 PG (ref 26–34)
MCHC RBC AUTO-ENTMCNC: 33.3 G/DL (ref 31–37)
MCV RBC AUTO: 90 FL
MONOCYTES # BLD AUTO: 0.31 X10(3) UL (ref 0.1–1)
MONOCYTES NFR BLD AUTO: 5.2 %
NEUTROPHILS # BLD AUTO: 2.89 X10 (3) UL (ref 1.5–7.7)
NEUTROPHILS # BLD AUTO: 2.89 X10(3) UL (ref 1.5–7.7)
NEUTROPHILS NFR BLD AUTO: 48.3 %
NT-PROBNP SERPL-MCNC: 67 PG/ML (ref ?–450)
OSMOLALITY SERPL CALC.SUM OF ELEC: 293 MOSM/KG (ref 275–295)
P AXIS: 43 DEGREES
P-R INTERVAL: 136 MS
PLATELET # BLD AUTO: 280 10(3)UL (ref 150–450)
POTASSIUM SERPL-SCNC: 4.1 MMOL/L (ref 3.5–5.1)
PROT SERPL-MCNC: 7.2 G/DL (ref 6.4–8.2)
Q-T INTERVAL: 414 MS
QRS DURATION: 82 MS
QTC CALCULATION (BEZET): 434 MS
R AXIS: 42 DEGREES
RBC # BLD AUTO: 4.4 X10(6)UL
SODIUM SERPL-SCNC: 140 MMOL/L (ref 136–145)
T AXIS: 64 DEGREES
TROPONIN I HIGH SENSITIVITY: <3 NG/L
VENTRICULAR RATE: 66 BPM
WBC # BLD AUTO: 6 X10(3) UL (ref 4–11)

## 2023-05-24 PROCEDURE — 83880 ASSAY OF NATRIURETIC PEPTIDE: CPT | Performed by: EMERGENCY MEDICINE

## 2023-05-24 PROCEDURE — 93005 ELECTROCARDIOGRAM TRACING: CPT

## 2023-05-24 PROCEDURE — 71045 X-RAY EXAM CHEST 1 VIEW: CPT | Performed by: EMERGENCY MEDICINE

## 2023-05-24 PROCEDURE — 85025 COMPLETE CBC W/AUTO DIFF WBC: CPT | Performed by: EMERGENCY MEDICINE

## 2023-05-24 PROCEDURE — 80053 COMPREHEN METABOLIC PANEL: CPT | Performed by: EMERGENCY MEDICINE

## 2023-05-24 PROCEDURE — 99285 EMERGENCY DEPT VISIT HI MDM: CPT

## 2023-05-24 PROCEDURE — 93010 ELECTROCARDIOGRAM REPORT: CPT

## 2023-05-24 PROCEDURE — 36415 COLL VENOUS BLD VENIPUNCTURE: CPT

## 2023-05-24 PROCEDURE — 85379 FIBRIN DEGRADATION QUANT: CPT | Performed by: EMERGENCY MEDICINE

## 2023-05-24 PROCEDURE — 99284 EMERGENCY DEPT VISIT MOD MDM: CPT

## 2023-05-24 PROCEDURE — 84484 ASSAY OF TROPONIN QUANT: CPT | Performed by: EMERGENCY MEDICINE

## 2023-05-24 NOTE — ED INITIAL ASSESSMENT (HPI)
Pt to the ER via walk in d/t SOB and weakness. States was Covid + 5/12 and still having symptoms. Aox4.  resp even nonlabored

## 2023-05-25 ENCOUNTER — PATIENT OUTREACH (OUTPATIENT)
Dept: CASE MANAGEMENT | Age: 78
End: 2023-05-25

## 2023-05-25 NOTE — PROGRESS NOTES
1st attempt ED f/up apt request    Micheal Clifford  PCP  [id number]  [phone]  Apt:  May 30 @10:20am     No answer, LVM w/ apt info  Closing encounter

## 2023-05-30 ENCOUNTER — OFFICE VISIT (OUTPATIENT)
Dept: INTERNAL MEDICINE CLINIC | Facility: CLINIC | Age: 78
End: 2023-05-30
Payer: MEDICARE

## 2023-05-30 VITALS
OXYGEN SATURATION: 98 % | BODY MASS INDEX: 15 KG/M2 | DIASTOLIC BLOOD PRESSURE: 66 MMHG | SYSTOLIC BLOOD PRESSURE: 118 MMHG | TEMPERATURE: 97 F | WEIGHT: 100.81 LBS | HEART RATE: 73 BPM

## 2023-05-30 DIAGNOSIS — R13.10 DYSPHAGIA, UNSPECIFIED TYPE: ICD-10-CM

## 2023-05-30 DIAGNOSIS — E03.9 ACQUIRED HYPOTHYROIDISM: ICD-10-CM

## 2023-05-30 DIAGNOSIS — R06.02 CHRONIC SHORTNESS OF BREATH: Primary | ICD-10-CM

## 2023-05-30 DIAGNOSIS — Z86.16 HISTORY OF COVID-19: ICD-10-CM

## 2023-05-30 DIAGNOSIS — Z12.11 SCREEN FOR COLON CANCER: ICD-10-CM

## 2023-05-30 PROCEDURE — 3074F SYST BP LT 130 MM HG: CPT | Performed by: INTERNAL MEDICINE

## 2023-05-30 PROCEDURE — 1160F RVW MEDS BY RX/DR IN RCRD: CPT | Performed by: INTERNAL MEDICINE

## 2023-05-30 PROCEDURE — 99214 OFFICE O/P EST MOD 30 MIN: CPT | Performed by: INTERNAL MEDICINE

## 2023-05-30 PROCEDURE — 1111F DSCHRG MED/CURRENT MED MERGE: CPT | Performed by: INTERNAL MEDICINE

## 2023-05-30 PROCEDURE — 1170F FXNL STATUS ASSESSED: CPT | Performed by: INTERNAL MEDICINE

## 2023-05-30 PROCEDURE — 3078F DIAST BP <80 MM HG: CPT | Performed by: INTERNAL MEDICINE

## 2023-05-30 PROCEDURE — 1159F MED LIST DOCD IN RCRD: CPT | Performed by: INTERNAL MEDICINE

## 2023-06-01 ENCOUNTER — TELEPHONE (OUTPATIENT)
Dept: INTERNAL MEDICINE CLINIC | Facility: CLINIC | Age: 78
End: 2023-06-01

## 2023-06-01 DIAGNOSIS — R06.02 CHRONIC SHORTNESS OF BREATH: ICD-10-CM

## 2023-06-01 DIAGNOSIS — Z86.16 HISTORY OF COVID-19: Primary | ICD-10-CM

## 2023-06-01 LAB
AMB EXT BILIRUBIN, TOTAL: 0.6 MG/DL
AMB EXT BUN: 17 MG/DL
AMB EXT CALCIUM: 9.7
AMB EXT CARBON DIOXIDE: 25
AMB EXT CHLORIDE: 102
AMB EXT CHOLESTEROL, TOTAL: 185 MG/DL
AMB EXT CMP ALT: 16 U/L
AMB EXT CMP AST: 19 U/L
AMB EXT CREATININE: 0.67 MG/DL
AMB EXT GLUCOSE: 71 MG/DL
AMB EXT HDL CHOLESTEROL: 66 MG/DL
AMB EXT HEMATOCRIT: 39.2
AMB EXT HEMOGLOBIN: 13
AMB EXT LDL CHOLESTEROL, DIRECT: 98 MG/DL
AMB EXT MCV: 91
AMB EXT PLATELETS: 253
AMB EXT POSTASSIUM: 4.2 MMOL/L
AMB EXT SODIUM: 143 MMOL/L
AMB EXT TOTAL PROTEIN: 7.1
AMB EXT TRIGLYCERIDES: 121 MG/DL
AMB EXT VLDL: 21 MG/DL
AMB EXT WBC: 4.4 X10(3)UL

## 2023-06-01 NOTE — TELEPHONE ENCOUNTER
Dolores, [address] [facility] ordered a complete PFT on 5/20/23; I don't see that a referral was triggered on this, but pt is stating needs a prior-auth. Do we advise referral team? Any advice?

## 2023-06-01 NOTE — TELEPHONE ENCOUNTER
Per Layne at [facility]#[phone] PFT does not require prior auth but DOES require a referral. T#[id number]. The 2 codes I checked were [phone] and [phone]. I guess you would just put in a referral to out patient at [address] [place] [address] may need to notify Centralized Referrals of the above.

## 2023-06-01 NOTE — TELEPHONE ENCOUNTER
Pt states that AD wants her to have a lung function test. She wanted to check with her ins first. Her insurance will cover it but is requesting someone from our office call [employer] giving explanation and authorization as to why test is needed per patient.     [employer] [phone]

## 2023-06-05 ENCOUNTER — TELEPHONE (OUTPATIENT)
Dept: INTERNAL MEDICINE CLINIC | Facility: CLINIC | Age: 78
End: 2023-06-05

## 2023-06-05 DIAGNOSIS — Z12.11 SCREEN FOR COLON CANCER: ICD-10-CM

## 2023-06-05 DIAGNOSIS — R13.10 DYSPHAGIA, UNSPECIFIED TYPE: Primary | ICD-10-CM

## 2023-06-06 ENCOUNTER — APPOINTMENT (OUTPATIENT)
Dept: RESPIRATORY THERAPY | Facility: HOSPITAL | Age: 78
End: 2023-06-06
Attending: INTERNAL MEDICINE
Payer: MEDICARE

## 2023-06-06 DIAGNOSIS — R06.02 CHRONIC SHORTNESS OF BREATH: ICD-10-CM

## 2023-06-06 DIAGNOSIS — Z86.16 HISTORY OF COVID-19: ICD-10-CM

## 2023-06-06 NOTE — TELEPHONE ENCOUNTER
Called department at [facility] as referrals directed us to try to find codes for order. PFT department provided:  Procedure: Complete PFT Procedure: METHACHOLINE/ARIDOL CHALLENGE Scheduled at [facility] 6/6/2023 CPT codes: [id number] [facility]  Being completed at [facility] 6/6/2023        Sent message to referrals as I tried misc. Testing with no referral generated then tried DME order with referral # [id number] generated    Sent information for referrals for processing as patient scheduled today at .

## 2023-06-06 NOTE — TELEPHONE ENCOUNTER
GIANCARLO for someone in the respiratory dept at THE [facility] ph# [phone] to [facility] with CPT code for PFT2 test. Pt is scheduled today at 2;00 for this test and her insurance company is telling us she needs a referral.

## 2023-06-13 ENCOUNTER — TELEPHONE (OUTPATIENT)
Dept: INTERNAL MEDICINE CLINIC | Facility: CLINIC | Age: 78
End: 2023-06-13

## 2023-06-13 ENCOUNTER — OFFICE VISIT (OUTPATIENT)
Dept: INTERNAL MEDICINE CLINIC | Facility: CLINIC | Age: 78
End: 2023-06-13
Payer: MEDICARE

## 2023-06-13 VITALS
HEIGHT: 69 IN | SYSTOLIC BLOOD PRESSURE: 102 MMHG | DIASTOLIC BLOOD PRESSURE: 58 MMHG | WEIGHT: 100 LBS | TEMPERATURE: 97 F | OXYGEN SATURATION: 98 % | BODY MASS INDEX: 14.81 KG/M2 | HEART RATE: 72 BPM

## 2023-06-13 DIAGNOSIS — R07.9 CHEST PAIN, UNSPECIFIED TYPE: Primary | ICD-10-CM

## 2023-06-13 DIAGNOSIS — R06.02 CHRONIC SHORTNESS OF BREATH: ICD-10-CM

## 2023-06-13 LAB
ATRIAL RATE: 66 BPM
P AXIS: 70 DEGREES
P-R INTERVAL: 136 MS
Q-T INTERVAL: 438 MS
QRS DURATION: 94 MS
QTC CALCULATION (BEZET): 459 MS
R AXIS: 58 DEGREES
T AXIS: 76 DEGREES
VENTRICULAR RATE: 66 BPM

## 2023-06-13 PROCEDURE — 1159F MED LIST DOCD IN RCRD: CPT | Performed by: INTERNAL MEDICINE

## 2023-06-13 PROCEDURE — 3078F DIAST BP <80 MM HG: CPT | Performed by: INTERNAL MEDICINE

## 2023-06-13 PROCEDURE — 3074F SYST BP LT 130 MM HG: CPT | Performed by: INTERNAL MEDICINE

## 2023-06-13 PROCEDURE — 93000 ELECTROCARDIOGRAM COMPLETE: CPT | Performed by: INTERNAL MEDICINE

## 2023-06-13 PROCEDURE — 1160F RVW MEDS BY RX/DR IN RCRD: CPT | Performed by: INTERNAL MEDICINE

## 2023-06-13 PROCEDURE — 3008F BODY MASS INDEX DOCD: CPT | Performed by: INTERNAL MEDICINE

## 2023-06-13 PROCEDURE — 99214 OFFICE O/P EST MOD 30 MIN: CPT | Performed by: INTERNAL MEDICINE

## 2023-06-13 RX ORDER — ALBUTEROL SULFATE 90 UG/1
1 AEROSOL, METERED RESPIRATORY (INHALATION) EVERY 6 HOURS PRN
Qty: 1 EACH | Refills: 3 | Status: SHIPPED | OUTPATIENT
Start: 2023-06-13

## 2023-06-21 ENCOUNTER — OFFICE VISIT (OUTPATIENT)
Facility: CLINIC | Age: 78
End: 2023-06-21
Payer: MEDICARE

## 2023-06-21 VITALS
HEIGHT: 57 IN | HEART RATE: 74 BPM | OXYGEN SATURATION: 98 % | WEIGHT: 100 LBS | BODY MASS INDEX: 21.57 KG/M2 | SYSTOLIC BLOOD PRESSURE: 104 MMHG | RESPIRATION RATE: 16 BRPM | DIASTOLIC BLOOD PRESSURE: 52 MMHG

## 2023-06-21 DIAGNOSIS — R06.09 DYSPNEA ON EXERTION: Primary | ICD-10-CM

## 2023-06-21 PROCEDURE — 1159F MED LIST DOCD IN RCRD: CPT | Performed by: INTERNAL MEDICINE

## 2023-06-21 PROCEDURE — 3008F BODY MASS INDEX DOCD: CPT | Performed by: INTERNAL MEDICINE

## 2023-06-21 PROCEDURE — 3074F SYST BP LT 130 MM HG: CPT | Performed by: INTERNAL MEDICINE

## 2023-06-21 PROCEDURE — 3078F DIAST BP <80 MM HG: CPT | Performed by: INTERNAL MEDICINE

## 2023-06-21 PROCEDURE — 1160F RVW MEDS BY RX/DR IN RCRD: CPT | Performed by: INTERNAL MEDICINE

## 2023-06-21 PROCEDURE — 99204 OFFICE O/P NEW MOD 45 MIN: CPT | Performed by: INTERNAL MEDICINE

## 2023-06-21 RX ORDER — ALBUTEROL SULFATE 90 UG/1
2 AEROSOL, METERED RESPIRATORY (INHALATION) EVERY 4 HOURS PRN
Qty: 1 EACH | Refills: 11 | Status: SHIPPED | OUTPATIENT
Start: 2023-06-21

## 2023-06-21 RX ORDER — FLUTICASONE FUROATE AND VILANTEROL 100; 25 UG/1; UG/1
1 POWDER RESPIRATORY (INHALATION) DAILY
Qty: 1 EACH | Refills: 5 | Status: SHIPPED | OUTPATIENT
Start: 2023-06-21

## 2023-06-21 NOTE — PATIENT INSTRUCTIONS
Plan- to begin Breo -one puff every morning-- only one puff --  - rinse and spit          - to begin rescue inhaler -- albuterol - 2 puffs every 4 hours as needed           - call with any issues or concerns         - see me  In 6 weeks   -    Macario Munoz MD  Pulmonary Medicine  6/21/2023

## 2023-06-22 ENCOUNTER — TELEPHONE (OUTPATIENT)
Facility: CLINIC | Age: 78
End: 2023-06-22

## 2023-06-22 RX ORDER — PREDNISONE 10 MG/1
TABLET ORAL
Qty: 10 TABLET | Refills: 0 | Status: SHIPPED | OUTPATIENT
Start: 2023-06-22

## 2023-06-22 NOTE — TELEPHONE ENCOUNTER
Received fax for Breo Alternative from [place]. Dr Michel Gallego was shown form and circled what was wanted as alternative. Faxed to raza @[phone].

## 2023-06-22 NOTE — TELEPHONE ENCOUNTER
Per Dr. Amber Littlejohn, Rx for prednisone taper sent to pharmacy. Message left for pt and advised her to call with any questions.

## 2023-06-22 NOTE — TELEPHONE ENCOUNTER
Pt called c/o sob. Has not yet received Breo. Thinks she is not using Albuterol correctly. Used just 1 puff at 11am and then again 1 puff at 210pm.  Pt notified that she should use a second puff of Albuterol now (3:10pm) and will follow up with her. A follow up call placed to pt at 350pm and she reports that she had a little improvement with the 2nd puff of albuterol. Reiterated that she can use TWO PUFFS of albuterol every 4 hours. She will  [facility] tomorrow. Advised pt that it may take 4 or 5 days for it to begin to work and she should continue to use the Albuterol every 4 hours. Pt advised to go to ER if resp sx worsen despite albuterol use.

## 2023-06-23 NOTE — TELEPHONE ENCOUNTER
Pt started Breo this am.  Will continue using the Albuterol every 4 hours as needed. Pt does not want to start the prednisone and would like a natural alternative. Advised pt that Prednisone would help her SOB while the Breo starts to work for her. Pt states that she will think about it. Again advised pt that she should go to ER if resp symptoms worsen. Dr. Amber Littlejohn notified.

## 2023-07-16 ENCOUNTER — APPOINTMENT (OUTPATIENT)
Dept: GENERAL RADIOLOGY | Facility: HOSPITAL | Age: 78
End: 2023-07-16
Attending: EMERGENCY MEDICINE
Payer: MEDICARE

## 2023-07-16 ENCOUNTER — HOSPITAL ENCOUNTER (EMERGENCY)
Facility: HOSPITAL | Age: 78
Discharge: HOME | End: 2023-07-16
Attending: EMERGENCY MEDICINE
Payer: MEDICARE

## 2023-07-16 VITALS
HEIGHT: 59 IN | BODY MASS INDEX: 20.16 KG/M2 | OXYGEN SATURATION: 97 % | HEART RATE: 70 BPM | WEIGHT: 100 LBS | DIASTOLIC BLOOD PRESSURE: 57 MMHG | SYSTOLIC BLOOD PRESSURE: 107 MMHG | RESPIRATION RATE: 14 BRPM | TEMPERATURE: 97 F

## 2023-07-16 DIAGNOSIS — J45.901 EXACERBATION OF ASTHMA, UNSPECIFIED ASTHMA SEVERITY, UNSPECIFIED WHETHER PERSISTENT: Primary | ICD-10-CM

## 2023-07-16 LAB
ALBUMIN SERPL-MCNC: 4 G/DL (ref 3.4–5)
ALBUMIN/GLOB SERPL: 1.3 {RATIO} (ref 1–2)
ALP LIVER SERPL-CCNC: 77 U/L
ALT SERPL-CCNC: 27 U/L
ANION GAP SERPL CALC-SCNC: 5 MMOL/L (ref 0–18)
AST SERPL-CCNC: 23 U/L (ref 15–37)
BASOPHILS # BLD AUTO: 0.06 X10(3) UL (ref 0–0.2)
BASOPHILS NFR BLD AUTO: 1 %
BILIRUB SERPL-MCNC: 0.8 MG/DL (ref 0.1–2)
BUN BLD-MCNC: 26 MG/DL (ref 7–18)
CALCIUM BLD-MCNC: 9.3 MG/DL (ref 8.5–10.1)
CHLORIDE SERPL-SCNC: 106 MMOL/L (ref 98–112)
CO2 SERPL-SCNC: 26 MMOL/L (ref 21–32)
CREAT BLD-MCNC: 0.72 MG/DL
D DIMER PPP FEU-MCNC: <0.27 UG/ML FEU (ref ?–0.8)
EOSINOPHIL # BLD AUTO: 0.04 X10(3) UL (ref 0–0.7)
EOSINOPHIL NFR BLD AUTO: 0.6 %
ERYTHROCYTE [DISTWIDTH] IN BLOOD BY AUTOMATED COUNT: 14.2 %
GFR SERPLBLD BASED ON 1.73 SQ M-ARVRAT: 84 ML/MIN/1.73M2 (ref 60–?)
GLOBULIN PLAS-MCNC: 3.2 G/DL (ref 2.8–4.4)
GLUCOSE BLD-MCNC: 108 MG/DL (ref 70–99)
HCT VFR BLD AUTO: 39.1 %
HGB BLD-MCNC: 12.8 G/DL
IMM GRANULOCYTES # BLD AUTO: 0.01 X10(3) UL (ref 0–1)
IMM GRANULOCYTES NFR BLD: 0.2 %
LYMPHOCYTES # BLD AUTO: 2.13 X10(3) UL (ref 1–4)
LYMPHOCYTES NFR BLD AUTO: 33.8 %
MCH RBC QN AUTO: 30.1 PG (ref 26–34)
MCHC RBC AUTO-ENTMCNC: 32.7 G/DL (ref 31–37)
MCV RBC AUTO: 92 FL
MONOCYTES # BLD AUTO: 0.34 X10(3) UL (ref 0.1–1)
MONOCYTES NFR BLD AUTO: 5.4 %
NEUTROPHILS # BLD AUTO: 3.72 X10 (3) UL (ref 1.5–7.7)
NEUTROPHILS # BLD AUTO: 3.72 X10(3) UL (ref 1.5–7.7)
NEUTROPHILS NFR BLD AUTO: 59 %
NT-PROBNP SERPL-MCNC: 37 PG/ML (ref ?–450)
OSMOLALITY SERPL CALC.SUM OF ELEC: 289 MOSM/KG (ref 275–295)
PLATELET # BLD AUTO: 235 10(3)UL (ref 150–450)
POTASSIUM SERPL-SCNC: 3.6 MMOL/L (ref 3.5–5.1)
PROT SERPL-MCNC: 7.2 G/DL (ref 6.4–8.2)
RBC # BLD AUTO: 4.25 X10(6)UL
SODIUM SERPL-SCNC: 137 MMOL/L (ref 136–145)
TROPONIN I HIGH SENSITIVITY: <3 NG/L
WBC # BLD AUTO: 6.3 X10(3) UL (ref 4–11)

## 2023-07-16 PROCEDURE — 94640 AIRWAY INHALATION TREATMENT: CPT

## 2023-07-16 PROCEDURE — 71045 X-RAY EXAM CHEST 1 VIEW: CPT | Performed by: EMERGENCY MEDICINE

## 2023-07-16 PROCEDURE — 99284 EMERGENCY DEPT VISIT MOD MDM: CPT

## 2023-07-16 PROCEDURE — 93005 ELECTROCARDIOGRAM TRACING: CPT

## 2023-07-16 PROCEDURE — 85379 FIBRIN DEGRADATION QUANT: CPT | Performed by: EMERGENCY MEDICINE

## 2023-07-16 PROCEDURE — 83880 ASSAY OF NATRIURETIC PEPTIDE: CPT | Performed by: EMERGENCY MEDICINE

## 2023-07-16 PROCEDURE — 36415 COLL VENOUS BLD VENIPUNCTURE: CPT

## 2023-07-16 PROCEDURE — 80053 COMPREHEN METABOLIC PANEL: CPT | Performed by: EMERGENCY MEDICINE

## 2023-07-16 PROCEDURE — 84484 ASSAY OF TROPONIN QUANT: CPT | Performed by: EMERGENCY MEDICINE

## 2023-07-16 PROCEDURE — 93010 ELECTROCARDIOGRAM REPORT: CPT

## 2023-07-16 PROCEDURE — 85025 COMPLETE CBC W/AUTO DIFF WBC: CPT | Performed by: EMERGENCY MEDICINE

## 2023-07-16 PROCEDURE — 99285 EMERGENCY DEPT VISIT HI MDM: CPT

## 2023-07-16 RX ORDER — PREDNISONE 20 MG/1
60 TABLET ORAL ONCE
Status: COMPLETED | OUTPATIENT
Start: 2023-07-16 | End: 2023-07-16

## 2023-07-16 RX ORDER — PREDNISONE 20 MG/1
40 TABLET ORAL DAILY
Qty: 10 TABLET | Refills: 0 | Status: SHIPPED | OUTPATIENT
Start: 2023-07-16 | End: 2023-07-19

## 2023-07-16 RX ORDER — ALBUTEROL SULFATE 2.5 MG/3ML
2.5 SOLUTION RESPIRATORY (INHALATION) EVERY 4 HOURS PRN
Qty: 30 EACH | Refills: 0 | Status: SHIPPED | OUTPATIENT
Start: 2023-07-16 | End: 2023-08-15

## 2023-07-16 RX ORDER — IPRATROPIUM BROMIDE AND ALBUTEROL SULFATE 2.5; .5 MG/3ML; MG/3ML
3 SOLUTION RESPIRATORY (INHALATION) ONCE
Status: COMPLETED | OUTPATIENT
Start: 2023-07-16 | End: 2023-07-16

## 2023-07-16 NOTE — ED INITIAL ASSESSMENT (HPI)
Pt present to ED with c/o asthma exacerbation. Pt states the last 3 days her asthma has been acting up. Pt states she is sob with ADLs at home. Pt uses albuterol and  Breo. No distress noted, breathing easy non labored 97% on RA.

## 2023-07-17 ENCOUNTER — HOSPITAL ENCOUNTER (OUTPATIENT)
Facility: HOSPITAL | Age: 78
Setting detail: OBSERVATION
Discharge: HOME HEALTH | End: 2023-07-19
Attending: EMERGENCY MEDICINE | Admitting: HOSPITALIST
Payer: MEDICARE

## 2023-07-17 ENCOUNTER — OFFICE VISIT (OUTPATIENT)
Facility: CLINIC | Age: 78
End: 2023-07-17
Payer: MEDICARE

## 2023-07-17 ENCOUNTER — APPOINTMENT (OUTPATIENT)
Dept: GENERAL RADIOLOGY | Facility: HOSPITAL | Age: 78
End: 2023-07-17
Attending: EMERGENCY MEDICINE
Payer: MEDICARE

## 2023-07-17 VITALS — HEART RATE: 84 BPM | OXYGEN SATURATION: 100 %

## 2023-07-17 DIAGNOSIS — R41.82 ALTERED MENTAL STATUS, UNSPECIFIED ALTERED MENTAL STATUS TYPE: Primary | ICD-10-CM

## 2023-07-17 DIAGNOSIS — J45.909 UNCOMPLICATED ASTHMA, UNSPECIFIED ASTHMA SEVERITY, UNSPECIFIED WHETHER PERSISTENT: ICD-10-CM

## 2023-07-17 DIAGNOSIS — R06.09 EXERTIONAL DYSPNEA: Primary | ICD-10-CM

## 2023-07-17 LAB
ADENOVIRUS PCR:: NOT DETECTED
ALBUMIN SERPL-MCNC: 4 G/DL (ref 3.4–5)
ALBUMIN/GLOB SERPL: 1.2 {RATIO} (ref 1–2)
ALP LIVER SERPL-CCNC: 77 U/L
ALT SERPL-CCNC: 24 U/L
ANION GAP SERPL CALC-SCNC: 7 MMOL/L (ref 0–18)
AST SERPL-CCNC: 23 U/L (ref 15–37)
ATRIAL RATE: 74 BPM
ATRIAL RATE: 75 BPM
B PARAPERT DNA SPEC QL NAA+PROBE: NOT DETECTED
B PERT DNA SPEC QL NAA+PROBE: NOT DETECTED
BASOPHILS # BLD AUTO: 0.04 X10(3) UL (ref 0–0.2)
BASOPHILS NFR BLD AUTO: 0.5 %
BILIRUB SERPL-MCNC: 0.9 MG/DL (ref 0.1–2)
BUN BLD-MCNC: 24 MG/DL (ref 7–18)
C PNEUM DNA SPEC QL NAA+PROBE: NOT DETECTED
CALCIUM BLD-MCNC: 9.4 MG/DL (ref 8.5–10.1)
CHLORIDE SERPL-SCNC: 106 MMOL/L (ref 98–112)
CO2 SERPL-SCNC: 25 MMOL/L (ref 21–32)
CORONAVIRUS 229E PCR:: NOT DETECTED
CORONAVIRUS HKU1 PCR:: NOT DETECTED
CORONAVIRUS NL63 PCR:: NOT DETECTED
CORONAVIRUS OC43 PCR:: NOT DETECTED
CREAT BLD-MCNC: 0.69 MG/DL
D DIMER PPP FEU-MCNC: <0.27 UG/ML FEU (ref ?–0.8)
EOSINOPHIL # BLD AUTO: 0.01 X10(3) UL (ref 0–0.7)
EOSINOPHIL NFR BLD AUTO: 0.1 %
ERYTHROCYTE [DISTWIDTH] IN BLOOD BY AUTOMATED COUNT: 13.9 %
FLUAV RNA SPEC QL NAA+PROBE: NOT DETECTED
FLUBV RNA SPEC QL NAA+PROBE: NOT DETECTED
GFR SERPLBLD BASED ON 1.73 SQ M-ARVRAT: 88 ML/MIN/1.73M2 (ref 60–?)
GLOBULIN PLAS-MCNC: 3.4 G/DL (ref 2.8–4.4)
GLUCOSE BLD-MCNC: 101 MG/DL (ref 70–99)
HCT VFR BLD AUTO: 39.5 %
HGB BLD-MCNC: 13.4 G/DL
IMM GRANULOCYTES # BLD AUTO: 0.02 X10(3) UL (ref 0–1)
IMM GRANULOCYTES NFR BLD: 0.2 %
LYMPHOCYTES # BLD AUTO: 2.74 X10(3) UL (ref 1–4)
LYMPHOCYTES NFR BLD AUTO: 31.5 %
MCH RBC QN AUTO: 30 PG (ref 26–34)
MCHC RBC AUTO-ENTMCNC: 33.9 G/DL (ref 31–37)
MCV RBC AUTO: 88.6 FL
METAPNEUMOVIRUS PCR:: NOT DETECTED
MONOCYTES # BLD AUTO: 0.36 X10(3) UL (ref 0.1–1)
MONOCYTES NFR BLD AUTO: 4.1 %
MYCOPLASMA PNEUMONIA PCR:: NOT DETECTED
NEUTROPHILS # BLD AUTO: 5.53 X10 (3) UL (ref 1.5–7.7)
NEUTROPHILS # BLD AUTO: 5.53 X10(3) UL (ref 1.5–7.7)
NEUTROPHILS NFR BLD AUTO: 63.6 %
OSMOLALITY SERPL CALC.SUM OF ELEC: 290 MOSM/KG (ref 275–295)
P AXIS: 67 DEGREES
P AXIS: 70 DEGREES
P-R INTERVAL: 156 MS
P-R INTERVAL: 158 MS
PARAINFLUENZA 1 PCR:: NOT DETECTED
PARAINFLUENZA 2 PCR:: NOT DETECTED
PARAINFLUENZA 3 PCR:: NOT DETECTED
PARAINFLUENZA 4 PCR:: NOT DETECTED
PLATELET # BLD AUTO: 247 10(3)UL (ref 150–450)
POTASSIUM SERPL-SCNC: 3.5 MMOL/L (ref 3.5–5.1)
PROT SERPL-MCNC: 7.4 G/DL (ref 6.4–8.2)
Q-T INTERVAL: 416 MS
Q-T INTERVAL: 416 MS
QRS DURATION: 80 MS
QRS DURATION: 80 MS
QTC CALCULATION (BEZET): 461 MS
QTC CALCULATION (BEZET): 464 MS
R AXIS: 38 DEGREES
R AXIS: 42 DEGREES
RBC # BLD AUTO: 4.46 X10(6)UL
RHINOVIRUS/ENTERO PCR:: NOT DETECTED
RSV RNA SPEC QL NAA+PROBE: NOT DETECTED
SARS-COV-2 RNA NPH QL NAA+NON-PROBE: NOT DETECTED
SODIUM SERPL-SCNC: 138 MMOL/L (ref 136–145)
T AXIS: 63 DEGREES
T AXIS: 64 DEGREES
TROPONIN I HIGH SENSITIVITY: 4 NG/L
TROPONIN I HIGH SENSITIVITY: 4 NG/L
TROPONIN I HIGH SENSITIVITY: <3 NG/L
VENTRICULAR RATE: 74 BPM
VENTRICULAR RATE: 75 BPM
WBC # BLD AUTO: 8.7 X10(3) UL (ref 4–11)

## 2023-07-17 PROCEDURE — 99223 1ST HOSP IP/OBS HIGH 75: CPT | Performed by: HOSPITALIST

## 2023-07-17 PROCEDURE — 71045 X-RAY EXAM CHEST 1 VIEW: CPT | Performed by: EMERGENCY MEDICINE

## 2023-07-17 RX ORDER — ENEMA 19; 7 G/133ML; G/133ML
1 ENEMA RECTAL ONCE AS NEEDED
Status: DISCONTINUED | OUTPATIENT
Start: 2023-07-17 | End: 2023-07-19

## 2023-07-17 RX ORDER — MELATONIN
3 NIGHTLY PRN
Status: DISCONTINUED | OUTPATIENT
Start: 2023-07-17 | End: 2023-07-19

## 2023-07-17 RX ORDER — POLYETHYLENE GLYCOL 3350 17 G/17G
17 POWDER, FOR SOLUTION ORAL DAILY PRN
Status: DISCONTINUED | OUTPATIENT
Start: 2023-07-17 | End: 2023-07-19

## 2023-07-17 RX ORDER — ENOXAPARIN SODIUM 100 MG/ML
40 INJECTION SUBCUTANEOUS EVERY EVENING
Status: DISCONTINUED | OUTPATIENT
Start: 2023-07-17 | End: 2023-07-19

## 2023-07-17 RX ORDER — IPRATROPIUM BROMIDE AND ALBUTEROL SULFATE 2.5; .5 MG/3ML; MG/3ML
3 SOLUTION RESPIRATORY (INHALATION) EVERY 6 HOURS PRN
Status: DISCONTINUED | OUTPATIENT
Start: 2023-07-17 | End: 2023-07-18

## 2023-07-17 RX ORDER — ONDANSETRON 2 MG/ML
4 INJECTION INTRAMUSCULAR; INTRAVENOUS EVERY 6 HOURS PRN
Status: DISCONTINUED | OUTPATIENT
Start: 2023-07-17 | End: 2023-07-19

## 2023-07-17 RX ORDER — BENZONATATE 200 MG/1
200 CAPSULE ORAL 3 TIMES DAILY PRN
Status: DISCONTINUED | OUTPATIENT
Start: 2023-07-17 | End: 2023-07-19

## 2023-07-17 RX ORDER — POTASSIUM CHLORIDE 20 MEQ/1
40 TABLET, EXTENDED RELEASE ORAL EVERY 4 HOURS
Status: COMPLETED | OUTPATIENT
Start: 2023-07-17 | End: 2023-07-17

## 2023-07-17 RX ORDER — SENNOSIDES 8.6 MG
17.2 TABLET ORAL NIGHTLY PRN
Status: DISCONTINUED | OUTPATIENT
Start: 2023-07-17 | End: 2023-07-19

## 2023-07-17 RX ORDER — METOCLOPRAMIDE HYDROCHLORIDE 5 MG/ML
10 INJECTION INTRAMUSCULAR; INTRAVENOUS EVERY 8 HOURS PRN
Status: DISCONTINUED | OUTPATIENT
Start: 2023-07-17 | End: 2023-07-19

## 2023-07-17 RX ORDER — FLUTICASONE FUROATE AND VILANTEROL 100; 25 UG/1; UG/1
1 POWDER RESPIRATORY (INHALATION) DAILY
Status: DISCONTINUED | OUTPATIENT
Start: 2023-07-17 | End: 2023-07-19

## 2023-07-17 RX ORDER — BISACODYL 10 MG
10 SUPPOSITORY, RECTAL RECTAL
Status: DISCONTINUED | OUTPATIENT
Start: 2023-07-17 | End: 2023-07-19

## 2023-07-17 RX ORDER — ACETAMINOPHEN 500 MG
1000 TABLET ORAL EVERY 4 HOURS PRN
Status: DISCONTINUED | OUTPATIENT
Start: 2023-07-17 | End: 2023-07-19

## 2023-07-17 NOTE — DISCHARGE INSTRUCTIONS
[facility] [place] [address] [phone]    [address]  P: [phone]  F: [phone]    Sometimes managing your health at home requires assistance. The [facility] team has recognized your preference to use [address]. They can be reached by phone at [phone]. The fax number for your reference is [phone]. A representative from the home health agency will contact you or your family to schedule your first visit.

## 2023-07-17 NOTE — ED QUICK NOTES
Pt ambulated with PCT. Pt reported that she still felt short of breath w/exertion. Pt respirations easy, nonlabored. O2 sat 100% at this time. ERMD notified. Pt to be admitted per ERMD. Pt provided with cup of ice water per ok from ERMD. Denies further needs at this time.

## 2023-07-17 NOTE — ED INITIAL ASSESSMENT (HPI)
Patient to ED via EMS from doctors office. Patient was seen here yesterday for asthma, had her follow up appointment today. Reports she was starting to have a lot of difficulty breathing while waiting, staff thought maybe the patient had a seizure while in her chair, no real description of even per EMS.

## 2023-07-17 NOTE — ED QUICK NOTES
Orders for admission, patient is aware of plan and ready to go upstairs. Any questions, please call ED RN Chiquis at extension [phone].      Patient Covid vaccination status: Unvaccinated     COVID Test Ordered in ED: None    COVID Suspicion at Admission: N/A    Running Infusions:  None    Mental Status/LOC at time of transport: a&ox4    Other pertinent information: ambulatory w/assist, uses bedside commode per request, on room air  CIWA score: N/A   NIH score:  N/A

## 2023-07-17 NOTE — ED QUICK NOTES
Bedside report with Ruddy Jones RN. Assumed care of pt at this time. Pt a&ox4, updated on plan of care and agreeable at this time.

## 2023-07-17 NOTE — ED QUICK NOTES
Report received from [address] [place] [address]. Patient is resting comfortably at this time. Waiting for respiratory to start breathing treatment.

## 2023-07-17 NOTE — ED QUICK NOTES
Rounding Completed, patient is on the phone at this time, reports she feels well, blanket provided as requested   Waiting for additional results. Bed is locked and in lowest position. Call light within reach.

## 2023-07-17 NOTE — PROGRESS NOTES
At approximately 2020 [facility] came to get this writer to assist her in exam room 8. Upon arrival to exam room, pt was found to be sitting in wheelchair. Pt was complaining of SOB. Jyoti Clark was instructing pt to use her inhaler that she had in her purse. Pt did inhaler but continued to c/o SOB. Pt saturation noted to be %. Pulse 84. Manual b/p taken and was 117/60. Respirations 18-22/min. Jyoti Clark began placing pt on 2L oxygen via Nasal cannula. Pt respirations began increasing rapidly and were at approx 30/min. Pt was encouraged to take slow deep breaths. This writer left the exam room and tried to call Ayaka GUTIERREZ (as pt was in the office for 100 appt with Ayaka Best.) I was told Ayaka Best was on her way to the office. From the RN desk I heard the pt moaning out and pt was hyperventilating. Pt stated she \"couldn't breathe\". Jyoti Clark remained in the room with the patient. When Baljit Salazar arrived to office she was brought into exam room 8 immediately. At that time, the pt was found to be stiffened straight out in the wheelchair(slid forward with legs straight out and head back on the backing of the wheelchair) with a glazed stare and non responsive to staff. 911 was called by  Aric Merrill. Pt remained unresponsive for approx 30-45 seconds. Dr Rick Trujillo was called into the room as well. Blood glucose checked and was 117. Pt then became responsive x 1. While awaiting EMS arrival, pt began complaining of neck pain to Dr Zachariah Croft. Pt at that time was a&O x 2. When EMS arrived, report was given by dr Rick Trujillo and face sheet and medication and medical history provided. Pt transported out of dept via EMS rPearl in stable condition and in no distress. Pt purse accompanied pt out of dept with EMS.

## 2023-07-17 NOTE — ED QUICK NOTES
Pt ambulated with pulse ox. Sats only dropped to 95% when ambulating. Pt c/o subjective SOB when she sat down, did not appear to be in any respiratory distress.

## 2023-07-17 NOTE — PROGRESS NOTES
12:45 pm Nurse summoned to office lobby where pt was sitting in wheel chart, pt voiced she had shortness of breath to .  Pt was alert, responsive to verbal stimuli, skin warm to dry, color was pale pink. Pt in office today for er follow up (shortness of breath). Pt states she continue to experience shortness of breath and while in office, did use her albuterol inhaler while in office. Nurse wheeled pt to examine room, took partial vital signs then stopped due to pt c/o of worsening shortness of breath. At this time, nurse requested for assistance from head nurse Both nurses in room with patient, O2 2 LPM started, MD & APN room.

## 2023-07-17 NOTE — ED QUICK NOTES
Patient thinks she exerted herself too much this morning. Walking to the car and to the doctors appointment. + weakness, tired and difficulty breathing with too much activity. Patient does not think she had a seizure or passed out, feels like she remembers everything. Patient states she can feel breathe better at this time. Patient on monitor, in NAD.  VSS, side rails x 2 up, call light in reach.   Will continue to monitor while awaiting further orders

## 2023-07-18 ENCOUNTER — NURSE ONLY (OUTPATIENT)
Dept: ELECTROPHYSIOLOGY | Facility: HOSPITAL | Age: 78
End: 2023-07-18
Attending: NURSE PRACTITIONER
Payer: MEDICARE

## 2023-07-18 ENCOUNTER — APPOINTMENT (OUTPATIENT)
Dept: CT IMAGING | Facility: HOSPITAL | Age: 78
End: 2023-07-18
Attending: INTERNAL MEDICINE
Payer: MEDICARE

## 2023-07-18 ENCOUNTER — APPOINTMENT (OUTPATIENT)
Dept: CV DIAGNOSTICS | Facility: HOSPITAL | Age: 78
End: 2023-07-18
Attending: INTERNAL MEDICINE
Payer: MEDICARE

## 2023-07-18 PROBLEM — R29.810 FACIAL DROOP: Status: ACTIVE | Noted: 2023-07-18

## 2023-07-18 PROBLEM — R07.9 CHEST PAIN: Status: ACTIVE | Noted: 2023-07-18

## 2023-07-18 PROBLEM — E03.9 HYPOTHYROIDISM: Status: ACTIVE | Noted: 2021-05-19

## 2023-07-18 PROBLEM — F41.9 ANXIETY: Status: ACTIVE | Noted: 2023-07-18

## 2023-07-18 PROBLEM — R29.3: Status: ACTIVE | Noted: 2023-07-18

## 2023-07-18 LAB
ALBUMIN SERPL-MCNC: 3.5 G/DL (ref 3.4–5)
ALBUMIN/GLOB SERPL: 1.3 {RATIO} (ref 1–2)
ALP LIVER SERPL-CCNC: 62 U/L
ALT SERPL-CCNC: 18 U/L
ANION GAP SERPL CALC-SCNC: 3 MMOL/L (ref 0–18)
AST SERPL-CCNC: 17 U/L (ref 15–37)
ATRIAL RATE: 67 BPM
BASE EXCESS BLDV CALC-SCNC: 4.1 MMOL/L
BASOPHILS # BLD AUTO: 0.06 X10(3) UL (ref 0–0.2)
BASOPHILS NFR BLD AUTO: 0.8 %
BILIRUB SERPL-MCNC: 0.8 MG/DL (ref 0.1–2)
BUN BLD-MCNC: 23 MG/DL (ref 7–18)
CA-I BLD-SCNC: 1.2 MMOL/L (ref 0.95–1.32)
CALCIUM BLD-MCNC: 8.8 MG/DL (ref 8.5–10.1)
CHLORIDE SERPL-SCNC: 110 MMOL/L (ref 98–112)
CK SERPL-CCNC: 84 U/L
CO2 SERPL-SCNC: 26 MMOL/L (ref 21–32)
CREAT BLD-MCNC: 0.62 MG/DL
EOSINOPHIL # BLD AUTO: 0.05 X10(3) UL (ref 0–0.7)
EOSINOPHIL NFR BLD AUTO: 0.7 %
ERYTHROCYTE [DISTWIDTH] IN BLOOD BY AUTOMATED COUNT: 14.3 %
GFR SERPLBLD BASED ON 1.73 SQ M-ARVRAT: 90 ML/MIN/1.73M2 (ref 60–?)
GLOBULIN PLAS-MCNC: 2.7 G/DL (ref 2.8–4.4)
GLUCOSE BLD-MCNC: 122 MG/DL (ref 70–99)
GLUCOSE BLD-MCNC: 89 MG/DL (ref 70–99)
HCO3 BLDV-SCNC: 28.1 MEQ/L (ref 22–26)
HCT VFR BLD AUTO: 34.3 %
HGB BLD-MCNC: 11.5 G/DL
IMM GRANULOCYTES # BLD AUTO: 0.01 X10(3) UL (ref 0–1)
IMM GRANULOCYTES NFR BLD: 0.1 %
LYMPHOCYTES # BLD AUTO: 2.64 X10(3) UL (ref 1–4)
LYMPHOCYTES NFR BLD AUTO: 37.4 %
MAGNESIUM SERPL-MCNC: 2.2 MG/DL (ref 1.6–2.6)
MCH RBC QN AUTO: 30.2 PG (ref 26–34)
MCHC RBC AUTO-ENTMCNC: 33.5 G/DL (ref 31–37)
MCV RBC AUTO: 90 FL
MONOCYTES # BLD AUTO: 0.3 X10(3) UL (ref 0.1–1)
MONOCYTES NFR BLD AUTO: 4.2 %
NEUTROPHILS # BLD AUTO: 4 X10 (3) UL (ref 1.5–7.7)
NEUTROPHILS # BLD AUTO: 4 X10(3) UL (ref 1.5–7.7)
NEUTROPHILS NFR BLD AUTO: 56.8 %
OSMOLALITY SERPL CALC.SUM OF ELEC: 293 MOSM/KG (ref 275–295)
OXYHGB MFR BLDV: 97.6 % (ref 72–78)
P AXIS: 78 DEGREES
P-R INTERVAL: 158 MS
PCO2 BLDV: 41 MM HG (ref 38–50)
PH BLDV: 7.45 [PH] (ref 7.33–7.43)
PHOSPHATE SERPL-MCNC: 3.1 MG/DL (ref 2.5–4.9)
PLATELET # BLD AUTO: 211 10(3)UL (ref 150–450)
PO2 BLDV: 144 MM HG (ref 30–50)
POTASSIUM BLD-SCNC: 5 MMOL/L (ref 3.6–5.1)
POTASSIUM SERPL-SCNC: 4.7 MMOL/L (ref 3.5–5.1)
POTASSIUM SERPL-SCNC: 4.7 MMOL/L (ref 3.5–5.1)
PROT SERPL-MCNC: 6.2 G/DL (ref 6.4–8.2)
Q-T INTERVAL: 390 MS
QRS DURATION: 74 MS
QTC CALCULATION (BEZET): 412 MS
R AXIS: 59 DEGREES
RBC # BLD AUTO: 3.81 X10(6)UL
SODIUM BLD-SCNC: 136 MMOL/L (ref 135–145)
SODIUM SERPL-SCNC: 139 MMOL/L (ref 136–145)
T AXIS: 74 DEGREES
T4 FREE SERPL-MCNC: 0.9 NG/DL (ref 0.8–1.7)
TSI SER-ACNC: 11.9 MIU/ML (ref 0.36–3.74)
VENTRICULAR RATE: 67 BPM
WBC # BLD AUTO: 7.1 X10(3) UL (ref 4–11)

## 2023-07-18 PROCEDURE — 99205 OFFICE O/P NEW HI 60 MIN: CPT | Performed by: OTHER

## 2023-07-18 PROCEDURE — 93306 TTE W/DOPPLER COMPLETE: CPT | Performed by: INTERNAL MEDICINE

## 2023-07-18 PROCEDURE — 70450 CT HEAD/BRAIN W/O DYE: CPT | Performed by: INTERNAL MEDICINE

## 2023-07-18 PROCEDURE — 99232 SBSQ HOSP IP/OBS MODERATE 35: CPT | Performed by: STUDENT IN AN ORGANIZED HEALTH CARE EDUCATION/TRAINING PROGRAM

## 2023-07-18 RX ORDER — IPRATROPIUM BROMIDE AND ALBUTEROL SULFATE 2.5; .5 MG/3ML; MG/3ML
3 SOLUTION RESPIRATORY (INHALATION)
Status: DISCONTINUED | OUTPATIENT
Start: 2023-07-18 | End: 2023-07-19

## 2023-07-18 RX ORDER — KETOROLAC TROMETHAMINE 15 MG/ML
15 INJECTION, SOLUTION INTRAMUSCULAR; INTRAVENOUS ONCE
Status: DISCONTINUED | OUTPATIENT
Start: 2023-07-18 | End: 2023-07-19

## 2023-07-18 NOTE — PHYSICAL THERAPY NOTE
PHYSICAL THERAPY EVALUATION - INPATIENT     Room Number: 7150/7666-O  Evaluation Date: 7/18/2023  Type of Evaluation: Initial  Physician Order: PT Eval and Treat    Presenting Problem: shortness of breath, syncopal episode  Co-Morbidities : asthma, fibromyalgia, hypothryoidism  Reason for Therapy: Mobility Dysfunction and Discharge Planning    History related to current admission: Patient is a [de-identified]year old female admitted on 7/17/2023 from pulmonology MD office for syncopal episode and incr shortness of breath. IMAGING:  CT BRAIN/HEAD:  No acute intracranial abnormality. If there is clinical concern for acute ischemia/infarction, an MRI of the brain would be recommended for further evaluation. ASSESSMENT   In this PT evaluation, the patient presents with the following impairments decr alertness/ incr lethargy, flat affect, decr awareness of need for assistance and safety, decr balance, decr activity tolerance/endurance, poor trunk control, decr functional mobility. These impairments and comorbidities manifest themselves as functional limitations in independent bed mobility, transfers, and gait. The patient is below baseline and would benefit from skilled inpatient PT to address the above deficits to assist patient in returning to prior to level of function. Functional outcome measures completed include [facility]. The AM-PAC '6-Clicks' Inpatient Basic Mobility Short Form was completed and this patient is demonstrating a Approx Degree of Impairment: 50.57%  degree of impairment in mobility. Research supports that patients with this level of impairment may benefit from DOROTEO. If pt refuses or is not accepted, pt will require 24 hour care and supervision and HHPT/OT services. DISCHARGE RECOMMENDATIONS  PT Discharge Recommendations: Sub-acute rehabilitation    PLAN  PT Treatment Plan: Bed mobility; Body mechanics; Energy conservation; Endurance; Patient education; Family education;Range of motion; Neuromuscular re-educate;Gait training;Strengthening;Stoop training;Stair training;Transfer training;Balance training  Rehab Potential : Fair  Frequency (Obs): 3-5x/week  Number of Visits to Meet Established Goals: 5      CURRENT GOALS    Goal #1 Patient is able to demonstrate supine - sit EOB @ level: modified independent     Goal #2 Patient is able to demonstrate transfers Sit to/from Stand at assistance level: modified independent     Goal #3 Patient is able to ambulate 50 feet with assist device: walker - rolling at assistance level: modified independent     Goal #4    Goal #5    Goal #6    Goal Comments: Goals established on 7/18/2023    HOME SITUATION  Type of Home: [facility] [address]: One level  Stairs to Enter : 0             Lives With: Alone  Drives: No  Patient Owned Equipment: Rolling walker; Wheelchair  Patient Regularly Uses: Reading glasses    Prior Level of [place]: Per pt- prior to two weeks ago was independent with all aspects of functional mobility. Used to ambulate 5 miles to grocery store weekly. Denies history of falls. Lives alone in apartment home, with no stairs. Son lives in [place], but drives hours to work. Daughter lives in [place]. Orders groceries to be delivered tgo the house now. Now using transport wheelchair in the home that she propels with her feet. She reports that she gets very winded when she stands up. SUBJECTIVE  \"The walker is horrible for me. \"      OBJECTIVE  Precautions: Bed/chair alarm  Fall Risk: High fall risk    WEIGHT BEARING RESTRICTION  Weight Bearing Restriction: None                PAIN ASSESSMENT  Rating: Unable to rate  Location: chest and head  Management Techniques: Activity promotion; Body mechanics;Repositioning    COGNITION  Arousal/Alertness:  delayed responses to stimuli and inconsistent responses to stimuli  Following Commands:  follows one step commands with increased time  Initiation: cues to initiate tasks  Safety Judgement: decreased awareness of need for assistance and decreased awareness of need for safety  Awareness of Errors:  assistance required to identify errors made and decreased awareness of errors   Awareness of Deficits:  decreased awareness of deficits    RANGE OF MOTION AND STRENGTH ASSESSMENT  Upper extremity ROM and strength are within functional limits     Lower extremity ROM is within functional limits     Lower extremity strength is within functional limits except for the following:   globally 4-/5      BALANCE  Static Sitting: Fair  Dynamic Sitting: Fair -  Static Standing: Poor +  Dynamic Standing: Poor    ADDITIONAL TESTS                                    ACTIVITY TOLERANCE                         O2 WALK       NEUROLOGICAL FINDINGS                        AM-PAC '6-Clicks' INPATIENT SHORT FORM - BASIC MOBILITY  How much difficulty does the patient currently have. .. Patient Difficulty: Turning over in bed (including adjusting bedclothes, sheets and blankets)?: A Little   Patient Difficulty: Sitting down on and standing up from a chair with arms (e.g., wheelchair, bedside commode, etc.): A Little   Patient Difficulty: Moving from lying on back to sitting on the side of the bed?: A Little   How much help from another person does the patient currently need. ..    Help from Another: Moving to and from a bed to a chair (including a wheelchair)?: A Little   Help from Another: Need to walk in hospital room?: A Little   Help from Another: Climbing 3-5 steps with a railing?: A Lot       AM-PAC Score:  Raw Score: 17   Approx Degree of Impairment: 50.57%   Standardized Score (AM-PAC Scale): 42.13   CMS Modifier (G-Code): CK    FUNCTIONAL ABILITY STATUS  Gait Assessment   Functional Mobility/Gait Assessment  Gait Assistance: Minimum assistance  Distance (ft): 5  Assistive Device: Rolling walker  Pattern:  (decr valerio, shuffle, near loss of balance)    Skilled Therapy Provided     Bed Mobility:  Rolling: Rojas   Supine to sit: Rojas    Sit to supine: Rojas      Transfer Mobility:  Sit to stand: Rojas to RW. Pt prefers both hands on RW, despite verbal cues for hand placement   Stand to sit: Rojas   Gait = Rojas w/ RW x 5 feet. Pt very slow valerio. Near loss of balance when ambulating requiring modA to regain static standing balance. Pt with shuffle gait pattern. Therapist's Comments: Patient presents with eyes closed resting in bed. Discussed role and goal of physical therapy in the hospital setting. Pt agreeable to therapy. Pt with incr difficulty keeping eyes open throughout session. Pt states she just doesn't feel good and is unable to keep eyes open. Pt on room air saturating in high 90s. Supine bed mobility: 121/54. Bed mobility completed at [facility]- pt stating she is unable to roll or transfer from side lying to siting EOB despite encouragement to try. Once sitting EOB pt with occasional swaying of trunk in static sitting. Sitting BP: 121/60. Sit to stand to RW completed at [facility], prefers both hands on RW despite verbal cues for hand placement. Standing BP: 119/58. Ambulated at [facility] with RW x 5 feet, very slow valerio, shuffle gait pattern. Pt with one near loss of balance posteriorly requiring modA to regain static standing balance. Observed pt to stop ambulating, eyes closed, requiring verbal cues to continue to ambulate. Pt reporting shortness of breath throughout ambulation trial. Sit to supine at Rojas. Supine BP: 128/59. Discussed recommendation to [facility] at this time as pt lives alone and is very weak, requiring incr assistance for all functional mobility tasks. 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 Emphasized importance of patient safety as right now she would be safe to go home alone. If pt refuses DOROTEO, pt will require 24 hour care and supervision and HHPT/OT. Will continue to see pt as she remains inpatient for IP PT  services. RN and SW made aware.      Exercise/Education Provided:  Bed mobility  Body mechanics  Energy conservation  Functional activity tolerated  Gait training  Transfer training    Patient End of Session: In bed;Needs met;Call light within reach;RN aware of session/findings; All patient questions and concerns addressed; Alarm set; Discussed recommendations with /      Patient Evaluation Complexity Level:  History Moderate - 1 or 2 personal factors and/or co-morbidities   Examination of body systems Moderate - addressing a total of 3 or more elements   Clinical Presentation Moderate - Evolving   Clinical Decision Making Moderate - Evolving       PT Session Time: 25 minutes  Gait Training: 10 minutes  Therapeutic Activity: 5 minutes  Neuromuscular Re-education:  minutes  Therapeutic Exercise:  minutes

## 2023-07-18 NOTE — PROGRESS NOTES
Resumed patient care at [address]. Patient is alert and oriented x 4. Complaining of headache, PRN Tylenol given. SOB with exertion. Lung sounds clear. Patient continent of bladder and bowel. Ambulates with SBA. Safety precautions in place, call light within reach, bed alarm on. Will continue to monitor. 0040: Patient started to complain  of chest pain, neck and jaw pain and tingling of the lips. EKG done, [facility] cards consulted, per them not cardiac related. Blood work ordered. Toradol refused by patient. Hospitalist on call notified, no now orders as of now. Pain improved overtime.

## 2023-07-18 NOTE — CM/SW NOTE
ELIZABETH was made aware by PT that they are recommending DOROTEO at discharge, but pt was refusing. ELIZABETH met with pt at bedside to discuss dc planning. Pt presents as alert and oriented x4, but presents with flat affect. Discussed that PT/OT are recommending D[facility]O at discharge, pt initially refusing recommendation. States that she has worked at [address] [place] [address] before and [employer] what they are like. \" SW acknowledged opinion, but also reiterated to pt that pt would require 24 hour supervision at dc since pt is not strong enough to be home alone. Pt inquired if insurance would cover a caregiver. ELIZABETH informed pt that insurance does not caregiver support, as that would be an out of pocket expense. Explained that the going rate for a caregiver is between $30-35 per hour depending on need. ELIZABETH encouraged to talk with family about dc planning. Explained that SW can arrange for [facility], but also explained that they would only come 2x per week for 30 minutes or so. ELIZABETH provided pt with SW contact information. ELIZABETH will follow back later on a discharge plan. ELIZABETH sent initial referrals for DOROTEO in [place].      CIRILO Terrazas, [facility]  Discharge Planner  [id number]

## 2023-07-18 NOTE — PHYSICAL THERAPY NOTE
PT order received. Chart reviewed. Brain CT ordered this morning, pt currently off floor for scan. Per chart, new onset of weakness. Will continue to follow as clinically appropriate and as schedule allows.

## 2023-07-18 NOTE — CM/SW NOTE
SW spoke with daughter, Mikaela Nieto, over the phone regarding discharge planning per pt request. Discussed that PT is recommending DOROTEO at discharge. Daughter did not seem agreeable to [facility]O at this time, but is interested in [facility]. Explained that pt should not be alone at discharge, as PT is recommending 24 hour supervision at discharge if DOROTEO is not the plan. Daughter stated that she was going to reach out to her brother and sister in law who live minutes from the pt to provide support at discharge. SW provided daughter with SW contact. ELIZABETH also left voicemail for [facility] [employer] Screener to see if pt could be screened for any services within the community. [facility] in [place]. Orders entered. DOROTEO referrals sent as back up.      CIRILO Wright, [facility]  Discharge Planner  [id number]

## 2023-07-18 NOTE — OCCUPATIONAL THERAPY NOTE
Received order for OT evaluation. Chart reviewed. Brain CT ordered this morning. Per chart, new onset of weakness. Will continue to follow.

## 2023-07-18 NOTE — PLAN OF CARE
Received care of pt. At 0730. Pt. Was lethargic and weak upon assessment. Possibly right facial droop. Doctor notified. Neuro saw. STAT CT (-), EEG ordered pending results, Pt unable to ambulate to bathroom and using bedside commode. Pt. Complaining of difficulty swallowing. SLP lilia put in.   Pt. Does not want to take medications but RN spoke with daughter on phone and Daughter will discuss with pt  Pt. Was alert for hospitalist.   Pt. Updated on plan of care  Call light within reach.    Problem: METABOLIC/FLUID AND ELECTROLYTES - ADULT  Goal: Glucose maintained within prescribed range  Description: INTERVENTIONS:  - Monitor Blood Glucose as ordered  - Assess for signs and symptoms of hyperglycemia and hypoglycemia  - Administer ordered medications to maintain glucose within target range  - Assess barriers to adequate nutritional intake and initiate nutrition consult as needed  - Instruct patient on self management of diabetes  Outcome: Progressing  Goal: Electrolytes maintained within normal limits  Description: INTERVENTIONS:  - Monitor labs and rhythm and assess patient for signs and symptoms of electrolyte imbalances  - Administer electrolyte replacement as ordered  - Monitor response to electrolyte replacements, including rhythm and repeat lab results as appropriate  - Fluid restriction as ordered  - Instruct patient on fluid and nutrition restrictions as appropriate  Outcome: Progressing  Goal: Hemodynamic stability and optimal renal function maintained  Description: INTERVENTIONS:  - Monitor labs and assess for signs and symptoms of volume excess or deficit  - Monitor intake, output and patient weight  - Monitor urine specific gravity, serum osmolarity and serum sodium as indicated or ordered  - Monitor response to interventions for patient's volume status, including labs, urine output, blood pressure (other measures as available)  - Encourage oral intake as appropriate  - Instruct patient on fluid and nutrition restrictions as appropriate  Outcome: Progressing     Problem: MUSCULOSKELETAL - ADULT  Goal: Return mobility to safest level of function  Description: INTERVENTIONS:  - Assess patient stability and activity tolerance for standing, transferring and ambulating w/ or w/o assistive devices  - Assist with transfers and ambulation using safe patient handling equipment as needed  - Ensure adequate protection for wounds/incisions during mobilization  - Obtain PT/OT consults as needed  - Advance activity as appropriate  - Communicate ordered activity level and limitations with patient/family  Outcome: Progressing  Goal: Maintain proper alignment of affected body part  Description: INTERVENTIONS:  - Support and protect limb and body alignment per provider's orders  - Instruct and reinforce with patient and family use of appropriate assistive device and precautions (e.g. spinal or hip dislocation precautions)  Outcome: Progressing     Problem: NEUROLOGICAL - ADULT  Goal: Achieves stable or improved neurological status  Description: INTERVENTIONS  - Assess for and report changes in neurological status  - Initiate measures to prevent increased intracranial pressure  - Maintain blood pressure and fluid volume within ordered parameters to optimize cerebral perfusion and minimize risk of hemorrhage  - Monitor temperature, glucose, and sodium.  Initiate appropriate interventions as ordered  Outcome: Progressing  Goal: Absence of seizures  Description: INTERVENTIONS  - Monitor for seizure activity  - Administer anti-seizure medications as ordered  - Monitor neurological status  Outcome: Progressing  Goal: Remains free of injury related to seizure activity  Description: INTERVENTIONS:  - Maintain airway, patient safety  and administer oxygen as ordered  - Monitor patient for seizure activity, document and report duration and description of seizure to MD/LIP  - If seizure occurs, turn patient to side and suction secretions as needed  - Reorient patient post seizure  - Seizure pads on all 4 side rails  - Instruct patient/family to notify RN of any seizure activity  - Instruct patient/family to call for assistance with activity based on assessment  Outcome: Progressing  Goal: Achieves maximal functionality and self care  Description: INTERVENTIONS  - Monitor swallowing and airway patency with patient fatigue and changes in neurological status  - Encourage and assist patient to increase activity and self care with guidance from PT/OT  - Encourage visually impaired, hearing impaired and aphasic patients to use assistive/communication devices  Outcome: Progressing     Problem: Patient/Family Goals  Goal: Patient/Family Long Term Goal  Description: Patient's Long Term Goal: Stay out of hospital     Interventions:  - follow up appointments  - medications   -PT/OT  - See additional Care Plan goals for specific interventions  Outcome: Progressing  Goal: Patient/Family Short Term Goal  Description: Patient's Short Term Goal: go home     Interventions:   - Medications  - PT/OT   - See additional Care Plan goals for specific interventions  Outcome: Progressing

## 2023-07-19 VITALS
WEIGHT: 100.31 LBS | RESPIRATION RATE: 18 BRPM | TEMPERATURE: 99 F | BODY MASS INDEX: 21.64 KG/M2 | DIASTOLIC BLOOD PRESSURE: 47 MMHG | OXYGEN SATURATION: 97 % | HEART RATE: 71 BPM | HEIGHT: 57 IN | SYSTOLIC BLOOD PRESSURE: 96 MMHG

## 2023-07-19 PROCEDURE — 99239 HOSP IP/OBS DSCHRG MGMT >30: CPT | Performed by: STUDENT IN AN ORGANIZED HEALTH CARE EDUCATION/TRAINING PROGRAM

## 2023-07-19 PROCEDURE — 99223 1ST HOSP IP/OBS HIGH 75: CPT | Performed by: STUDENT IN AN ORGANIZED HEALTH CARE EDUCATION/TRAINING PROGRAM

## 2023-07-19 RX ORDER — LEVOTHYROXINE SODIUM 0.03 MG/1
25 TABLET ORAL
Qty: 30 TABLET | Refills: 0 | Status: SHIPPED | OUTPATIENT
Start: 2023-07-19 | End: 2023-08-18

## 2023-07-19 RX ORDER — LEVOTHYROXINE SODIUM 0.03 MG/1
25 TABLET ORAL
Status: DISCONTINUED | OUTPATIENT
Start: 2023-07-19 | End: 2023-07-19

## 2023-07-19 NOTE — PLAN OF CARE
Patient alert and oriented x 4. Maintaining O2 saturation WNL on room air. No complains of pain or shortness of breath. NSR on tele monitor. EEG results pending. Generalized weakness, continent, using bedside commode. Safety precautions in place, call light within reach, bed alarm in place. Will continue to monitor.      Problem: METABOLIC/FLUID AND ELECTROLYTES - ADULT  Goal: Glucose maintained within prescribed range  Description: INTERVENTIONS:  - Monitor Blood Glucose as ordered  - Assess for signs and symptoms of hyperglycemia and hypoglycemia  - Administer ordered medications to maintain glucose within target range  - Assess barriers to adequate nutritional intake and initiate nutrition consult as needed  - Instruct patient on self management of diabetes  Outcome: Progressing  Goal: Electrolytes maintained within normal limits  Description: INTERVENTIONS:  - Monitor labs and rhythm and assess patient for signs and symptoms of electrolyte imbalances  - Administer electrolyte replacement as ordered  - Monitor response to electrolyte replacements, including rhythm and repeat lab results as appropriate  - Fluid restriction as ordered  - Instruct patient on fluid and nutrition restrictions as appropriate  Outcome: Progressing  Goal: Hemodynamic stability and optimal renal function maintained  Description: INTERVENTIONS:  - Monitor labs and assess for signs and symptoms of volume excess or deficit  - Monitor intake, output and patient weight  - Monitor urine specific gravity, serum osmolarity and serum sodium as indicated or ordered  - Monitor response to interventions for patient's volume status, including labs, urine output, blood pressure (other measures as available)  - Encourage oral intake as appropriate  - Instruct patient on fluid and nutrition restrictions as appropriate  Outcome: Progressing     Problem: MUSCULOSKELETAL - ADULT  Goal: Return mobility to safest level of function  Description: INTERVENTIONS:  - Assess patient stability and activity tolerance for standing, transferring and ambulating w/ or w/o assistive devices  - Assist with transfers and ambulation using safe patient handling equipment as needed  - Ensure adequate protection for wounds/incisions during mobilization  - Obtain PT/OT consults as needed  - Advance activity as appropriate  - Communicate ordered activity level and limitations with patient/family  Outcome: Progressing  Goal: Maintain proper alignment of affected body part  Description: INTERVENTIONS:  - Support and protect limb and body alignment per provider's orders  - Instruct and reinforce with patient and family use of appropriate assistive device and precautions (e.g. spinal or hip dislocation precautions)  Outcome: Progressing

## 2023-07-19 NOTE — PROGRESS NOTES
07/19/23 1720   Mobility   O2 walk?  Yes   SPO2% on Room Air at Rest 98   SPO2% Ambulation on Room Air 95

## 2023-07-19 NOTE — CM/SW NOTE
Spoke with daughter over the phone regarding discharge planning. Informed that the accepting [facility] agency is [facility]. [facility] agency in [place]. SW was also informed by DCSS that pt is approved for 9 hours of services per week through the [facility], and is entitled to a life alert button. Daughter is aware of these services. ELIZABETH updated AVS w/ Liliam Albert contact info.      David Mej[address]  P: [phone]  F: [address] [place] [address],[facility], [facility]  Discharge [address] [place] [address]  [id number] minimum assist (75% patients effort)

## 2023-07-19 NOTE — PROCEDURES
CHERELLE - ELECTROENCEPHALOGRAM (EEG) REPORT  Patient Name:  Ralph Ozuna   MRN / CSN:  [id number] / [id number]   Date of Birth / Age:  1/25/1945 /  [de-identified]year old   Encounter Date:  7/18/2023         METHODS:  Twenty-two electrodes were applied according to the 10-20-electrode placement system on this {[facility] EEG TYPES:473005} audio-video EEG. EKG monitoring, monopolar and bipolar montages are routinely utilized. The record was obtained on a digital system. OBJECT:  This is a [de-identified]year old year-old female with a PMH of *** who presents with ***    The EEG was requested to assess for epileptiform activity and change in mental status. State(s) of consciousness: {[facility] STATE OF CONSCIOUSNESS:69224}    Relevant medications:    ketorolac  15 mg Intravenous Once    ipratropium-albuterol  3 mL Nebulization Q6H WA    fluticasone furoate-vilanterol  1 puff Inhalation Daily    enoxaparin  40 mg Subcutaneous QPM       FINDINGS:  1) Background: A *** posterior-dominant background rhythm of *** Hz with an amplitude of *** microvolts is seen. 2) Sleep: No***rmal, symmetrical*** sleep complexes were noted. 3) Abnormalities: *** None  4) Activation:                    HV: Not performed. IPS: Not performed. IMPRESSION:  *** THIS IS A PRELIMINARY REPORT THAT REFLECTS THE FIRST HOUR OF RECORDING. This EEG is a***n ab***normal study recorded in the {[facility] STATE OF CONSCIOUSNESS:62033} states. *** No focal, lateralizing, or epileptiform activity is seen and no seizures are recorded.      Report covers  Start *** at ***  End *** at ***    SIGNATURES:  Betito Grace MD   [facility] Neurology

## 2023-07-19 NOTE — PLAN OF CARE
Pt AO x4. NSR on tele. O2 saturations WNL on room air. Up with 1 assist and walker. No complaints of pain or SOB. Possible discharge today. Updated on POC. Call light within reach.     Problem: Patient/Family Goals  Goal: Patient/Family Long Term Goal  Description: Patient's Long Term Goal:     Interventions:  -   - See additional Care Plan goals for specific interventions  Outcome: Progressing  Goal: Patient/Family Short Term Goal  Description: Patient's Short Term Goal:     Interventions:   -   - See additional Care Plan goals for specific interventions  Outcome: Progressing     Problem: METABOLIC/FLUID AND ELECTROLYTES - ADULT  Goal: Glucose maintained within prescribed range  Description: INTERVENTIONS:  - Monitor Blood Glucose as ordered  - Assess for signs and symptoms of hyperglycemia and hypoglycemia  - Administer ordered medications to maintain glucose within target range  - Assess barriers to adequate nutritional intake and initiate nutrition consult as needed  - Instruct patient on self management of diabetes  Outcome: Progressing  Goal: Electrolytes maintained within normal limits  Description: INTERVENTIONS:  - Monitor labs and rhythm and assess patient for signs and symptoms of electrolyte imbalances  - Administer electrolyte replacement as ordered  - Monitor response to electrolyte replacements, including rhythm and repeat lab results as appropriate  - Fluid restriction as ordered  - Instruct patient on fluid and nutrition restrictions as appropriate  Outcome: Progressing  Goal: Hemodynamic stability and optimal renal function maintained  Description: INTERVENTIONS:  - Monitor labs and assess for signs and symptoms of volume excess or deficit  - Monitor intake, output and patient weight  - Monitor urine specific gravity, serum osmolarity and serum sodium as indicated or ordered  - Monitor response to interventions for patient's volume status, including labs, urine output, blood pressure (other measures as available)  - Encourage oral intake as appropriate  - Instruct patient on fluid and nutrition restrictions as appropriate  Outcome: Progressing     Problem: MUSCULOSKELETAL - ADULT  Goal: Return mobility to safest level of function  Description: INTERVENTIONS:  - Assess patient stability and activity tolerance for standing, transferring and ambulating w/ or w/o assistive devices  - Assist with transfers and ambulation using safe patient handling equipment as needed  - Ensure adequate protection for wounds/incisions during mobilization  - Obtain PT/OT consults as needed  - Advance activity as appropriate  - Communicate ordered activity level and limitations with patient/family  Outcome: Progressing  Goal: Maintain proper alignment of affected body part  Description: INTERVENTIONS:  - Support and protect limb and body alignment per provider's orders  - Instruct and reinforce with patient and family use of appropriate assistive device and precautions (e.g. spinal or hip dislocation precautions)  Outcome: Progressing     Problem: NEUROLOGICAL - ADULT  Goal: Achieves stable or improved neurological status  Description: INTERVENTIONS  - Assess for and report changes in neurological status  - Initiate measures to prevent increased intracranial pressure  - Maintain blood pressure and fluid volume within ordered parameters to optimize cerebral perfusion and minimize risk of hemorrhage  - Monitor temperature, glucose, and sodium.  Initiate appropriate interventions as ordered  Outcome: Progressing  Goal: Absence of seizures  Description: INTERVENTIONS  - Monitor for seizure activity  - Administer anti-seizure medications as ordered  - Monitor neurological status  Outcome: Progressing  Goal: Remains free of injury related to seizure activity  Description: INTERVENTIONS:  - Maintain airway, patient safety  and administer oxygen as ordered  - Monitor patient for seizure activity, document and report duration and description of seizure to MD/LIP  - If seizure occurs, turn patient to side and suction secretions as needed  - Reorient patient post seizure  - Seizure pads on all 4 side rails  - Instruct patient/family to notify RN of any seizure activity  - Instruct patient/family to call for assistance with activity based on assessment  Outcome: Progressing  Goal: Achieves maximal functionality and self care  Description: INTERVENTIONS  - Monitor swallowing and airway patency with patient fatigue and changes in neurological status  - Encourage and assist patient to increase activity and self care with guidance from PT/OT  - Encourage visually impaired, hearing impaired and aphasic patients to use assistive/communication devices  Outcome: Progressing

## 2023-07-19 NOTE — PLAN OF CARE
Neurology Plan of Care Note    Preliminary EEG report negative for seizure or seizure like tendencies. Right frontal slowing noted, however, likely due to bad electrode/placement. Okay to DC patient from neurology standpoint, please call with questions. Dr. Corado Flow aware of the above.      LIN Brink   [employer](TM) by [employer] [id number]

## 2023-07-19 NOTE — PLAN OF CARE
Echo results reviewed and unremarkable. Tele reviewed, withuot arythmias or significant events overnight. As outlined by Dr. Ceci Tabor, Cardiolgy will sign off. No acute need for cardiologyfollow up at this time.

## 2023-07-20 ENCOUNTER — PATIENT OUTREACH (OUTPATIENT)
Dept: CASE MANAGEMENT | Age: 78
End: 2023-07-20

## 2023-07-20 ENCOUNTER — TELEPHONE (OUTPATIENT)
Facility: CLINIC | Age: 78
End: 2023-07-20

## 2023-07-20 DIAGNOSIS — Z02.9 ENCOUNTERS FOR UNSPECIFIED ADMINISTRATIVE PURPOSE: Primary | ICD-10-CM

## 2023-07-20 PROCEDURE — 1111F DSCHRG MED/CURRENT MED MERGE: CPT

## 2023-07-20 NOTE — CM/SW NOTE
ELIZABETH sent AVS to [address] of discharge today.     [address]  P: [phone]  F: [address] [facility], [facility], [facility]  Discharge [address] [id number]

## 2023-07-20 NOTE — PAYOR COMM NOTE
Discharge Notification    Patient Name: Glenys Tamir Sanders MA [facility]  Subscriber #: [id number]  Authorization Number: [id number]  Admit Date/Time: 7/17/2023 1:16 PM  Discharge Date/Time: 7/19/2023 8:00 PM    OBSERVATION PATIENT

## 2023-07-20 NOTE — TELEPHONE ENCOUNTER
Patient was released yesterday from the hospital with nebulizer solution but she does not have a nebulizer.

## 2023-07-20 NOTE — TELEPHONE ENCOUNTER
KASIE has nebulizer and she will let pt use hers and will get new tubing and mouthpiece from [employer].

## 2023-07-20 NOTE — PROGRESS NOTES
VM received (roly 07/19)  Called pt back    Dr Berto Vela  Internal Medicine  [address] [address]  [phone]  Follow up 1 week  Apt made: u 3207 @2:00pm w/FINESSE [address]  ENDOCRINOLOGY  [facility] Endocrinology  [facility] [address]  [phone]  Follow up 6 weeks  Apt made:   Wed 08/30 @1:00pm  Confirmed w/pt  Closing encounter

## 2023-07-20 NOTE — PROGRESS NOTES
Assumed care of pt at [address]. Tele removed and PIV removed for discharge. Pt wheeled in wheelchair down by [facility] at 2000 for discharge. Pt with discharge paperwork and all belongings.

## 2023-07-22 NOTE — DISCHARGE SUMMARY
[facility]PITAL[facility]  DISCHARGE SUMMARY     Niharika Blackman Patient Status:  Observation    1945 MRN [id number]   [facility] 8NE-A Attending No att. providers found   Hosp Day # 0 PCP Matty Paiz MD     Date of Admission: 2023  Date of Discharge: 2023  Discharge Disposition: [facility]    Admitting Diagnosis:   Exertional dyspnea [R06.09]    Hospital Discharge Diagnoses:   Lethargy and left facial droop  Acute respiratory distress  Chest pain  Near syncope  Hypothyroidism  Fibromyalgia  Raynaud's disease    Lace+ Score: 48  59-90 High Risk  29-58 Medium Risk  0-28   Low Risk. TCM Follow-Up Recommendation:  LACE 29-58: Moderate Risk of readmission after discharge from the hospital.        Discharge Diagnosis:   Acute dyspnea    History of Present Illness: Niharika Blackman is a [de-identified]year old female with past medical history asthma, fibromyalgia, hypothyroidism, who presents with shortness of breath. Patient presented to the ER yesterday for shortness of breath. Was diagnosed with an asthma exacerbation and prescribed steroids. Discharged home after work-up showing clear x-ray and satting 100%. Today followed up with her PCP and while there, developed severe shortness of breath. Was concerned that patient was having a \"seizure \"with decreased respirations. 911 was called. Dr. Garett Henderson was present and assessed the patient. Blood glucose was normal and breathing normalized. This is now her third visit in the last few months for similar shortness of breath. States she has been having some chest pain as well substernally. Seems to be exertional although the shortness of breath bothers her more. Brief Synopsis: Patient initially started on DuoNebs, pulmonology consulted, chest x-ray without acute cardiopulmonary findings noted. Patient improvement and shortness of breath following initiation of Breo, neb treatments.   Possibly symptoms worsen from recent exposure to external allergens and smoke. Cardiology consulted for cardiac evaluation of possible shortness of breath, troponins negative, TTE LVEF 60 to 65%, no regional wall motion abnormalities, no diastolic dysfunction, no other significant valvular abnormalities noted. Patient also noted to have stopped her levothyroxine many months ago, endocrinology consulted and restarted on levothyroxine, plan to follow-up outpatient for repeat labs and titration. Neurology consulted for initial symptoms of lethargy, seizure-like episodes, EEG completed with no acute findings, CT head without acute intracranial hemorrhage or mass. Patient without focal neurologic deficit noted and no further neurologic work-up recommended. Patient to follow-up with pulmonology, endocrinology as an outpatient. Procedures during hospitalization:   EEG    Incidental or significant findings and recommendations (brief descriptions): Follow-up with pulmonology as outpatient for management of unspecified etiology of patient's dyspnea  Follow-up with endocrinology for titration of Synthroid    Lab/Test results pending at Discharge:   None    Consultants:  Neurology, cardiology, pulmonology, endocrinology    Discharge Medication List:     Discharge Medications        START taking these medications        Instructions Prescription details   levothyroxine 25 MCG Tabs  Commonly known as: Synthroid      Take 1 tablet (25 mcg total) by mouth before breakfast.   Stop taking on: August 18, 2023  Quantity: 30 tablet  Refills: 0            CONTINUE taking these medications        Instructions Prescription details   albuterol 108 (90 Base) MCG/ACT Aers  Commonly known as: Ventolin HFA      Inhale 2 puffs into the lungs every 4 (four) hours as needed for Shortness of Breath.  inhale 2 puff by inhalation route  every 4 - 6 hours as needed   Quantity: 1 each  Refills: 11     albuterol (2.5 MG/3ML) 0.083% Nebu  Commonly known as: Ventolin      Take 3 mL (2.5 mg total) by nebulization every 4 (four) hours as needed for Wheezing or Shortness of Breath. Stop taking on: August 15, 2023  Quantity: 30 each  Refills: 0     GABRIELLA-MAG OR      Take 600 mg by mouth 2 (two) times a day. Refills: 0     COLLAGEN OR      Take by mouth. Refills: 0     DIGESTIVE ENZYMES OR      Take by mouth 2 (two) times a day. Refills: 0     fluticasone furoate-vilanterol 100-25 MCG/ACT Aepb  Commonly known as: Breo Ellipta      Inhale 1 puff into the lungs daily. Quantity: 1 each  Refills: 5     Omega 3 500 500 MG Caps      Take 1 capsule by mouth daily. 625 mg   Refills: 0     vitamin C 100 MG Tabs      Take 1 tablet (100 mg total) by mouth daily. Refills: 0     Vitamin D 50 MCG (2000 UT) Caps      Take by mouth. Refills: 0            STOP taking these medications      predniSONE 10 MG Tabs  Commonly known as: Deltasone        predniSONE 20 MG Tabs  Commonly known as: Deltasone                  Where to Get Your Medications        These medications were sent to [address] AT [facility] OF [address] [facility], [phone], [id number] [place] [address], Carolyn Velasco [address]      Phone: [phone]   levothyroxine 25 MCG Tabs         [place] No [place] substances prescribed at discharge    Follow-up appointment:   Darwin Sandy MD  [address] [facility]  [phone]    Follow up in 1 week(s)      Neela Ferreira MD  [address] [facility],[address]  [address] [place] [address] [phone]    Schedule an appointment as soon as possible for a visit in 1 week(s)  For routine follow-up after hospitilization    Alirio Moser MD  [address] [facility]    Schedule an appointment as soon as possible for a visit in 6 week(s)  For routine follow-up after hospitilization      Vital signs:       Physical Exam:    General: No acute distress.    Respiratory: Clear to auscultation bilaterally. No wheezes. No rhonchi. Cardiovascular: S1, S2. Regular rate and rhythm. No murmurs, rubs or gallops. Abdomen: Soft, nontender, nondistended. Positive bowel sounds. No rebound or guarding. Neurologic: No focal neurological deficits. Musculoskeletal: Moves all extremities. Extremities: No edema.   -----------------------------------------------------------------------------------------------  PATIENT DISCHARGE INSTRUCTIONS: See electronic chart    Huy Greenberg MD 7/21/2023    Time spent:  > 30 minutes

## 2023-07-27 ENCOUNTER — OFFICE VISIT (OUTPATIENT)
Dept: INTERNAL MEDICINE CLINIC | Facility: CLINIC | Age: 78
End: 2023-07-27
Payer: MEDICARE

## 2023-07-27 VITALS
SYSTOLIC BLOOD PRESSURE: 122 MMHG | WEIGHT: 97.81 LBS | BODY MASS INDEX: 21 KG/M2 | OXYGEN SATURATION: 97 % | HEART RATE: 76 BPM | DIASTOLIC BLOOD PRESSURE: 56 MMHG

## 2023-07-27 DIAGNOSIS — E03.9 ACQUIRED HYPOTHYROIDISM: ICD-10-CM

## 2023-07-27 DIAGNOSIS — Z09 HOSPITAL DISCHARGE FOLLOW-UP: Primary | ICD-10-CM

## 2023-07-27 DIAGNOSIS — J44.9 ASTHMA WITH COPD (CHRONIC OBSTRUCTIVE PULMONARY DISEASE) (HCC): ICD-10-CM

## 2023-07-27 DIAGNOSIS — R06.02 CHRONIC SHORTNESS OF BREATH: ICD-10-CM

## 2023-07-27 PROBLEM — J44.89 ASTHMA WITH COPD (CHRONIC OBSTRUCTIVE PULMONARY DISEASE) (HCC): Chronic | Status: ACTIVE | Noted: 2023-07-27

## 2023-07-27 PROBLEM — J44.89 ASTHMA WITH COPD (CHRONIC OBSTRUCTIVE PULMONARY DISEASE): Chronic | Status: ACTIVE | Noted: 2023-07-27

## 2023-07-27 PROCEDURE — 1111F DSCHRG MED/CURRENT MED MERGE: CPT | Performed by: PHYSICIAN ASSISTANT

## 2023-07-27 PROCEDURE — 3078F DIAST BP <80 MM HG: CPT | Performed by: PHYSICIAN ASSISTANT

## 2023-07-27 PROCEDURE — 1170F FXNL STATUS ASSESSED: CPT | Performed by: PHYSICIAN ASSISTANT

## 2023-07-27 PROCEDURE — 3074F SYST BP LT 130 MM HG: CPT | Performed by: PHYSICIAN ASSISTANT

## 2023-07-27 PROCEDURE — 99495 TRANSJ CARE MGMT MOD F2F 14D: CPT | Performed by: PHYSICIAN ASSISTANT

## 2023-07-27 PROCEDURE — 1160F RVW MEDS BY RX/DR IN RCRD: CPT | Performed by: PHYSICIAN ASSISTANT

## 2023-07-27 PROCEDURE — 1159F MED LIST DOCD IN RCRD: CPT | Performed by: PHYSICIAN ASSISTANT

## 2023-07-27 RX ORDER — NEBULIZER ACCESSORIES
KIT MISCELLANEOUS
Qty: 1 EACH | Refills: 0 | Status: SHIPPED | OUTPATIENT
Start: 2023-07-27

## 2023-07-28 NOTE — PROGRESS NOTES
Multiple attempts to reach pt and messages left with no return call. Patient went in for HFU appt with PCP office on 7/27/23. Encounter closing.

## 2023-07-31 ENCOUNTER — TELEPHONE (OUTPATIENT)
Dept: INTERNAL MEDICINE CLINIC | Facility: CLINIC | Age: 78
End: 2023-07-31

## 2023-07-31 DIAGNOSIS — N64.4 PAIN OF BOTH BREASTS: Primary | ICD-10-CM

## 2023-07-31 NOTE — TELEPHONE ENCOUNTER
Sharp pain in both breast that started yesterday. Today her left breast is worse and its now a constant pain.

## 2023-07-31 NOTE — TELEPHONE ENCOUNTER
Patient states a few days ago she had bilateral breast pain which was sharp and went away, no today she has left breast pain on the outer side of the left breast which is constant. Pt states she usually goes to [place] for thermography but has not in 5 years. AD indicates he would like her to have a Bilateral Dx Mammogram for evaluation, ordered and [facility] CS info given to schedule. Pt verbalizes understanding.

## 2023-08-07 ENCOUNTER — OFFICE VISIT (OUTPATIENT)
Facility: CLINIC | Age: 78
End: 2023-08-07
Payer: MEDICARE

## 2023-08-07 VITALS
BODY MASS INDEX: 21.79 KG/M2 | OXYGEN SATURATION: 100 % | DIASTOLIC BLOOD PRESSURE: 70 MMHG | HEIGHT: 57 IN | HEART RATE: 86 BPM | SYSTOLIC BLOOD PRESSURE: 110 MMHG | WEIGHT: 101 LBS

## 2023-08-07 DIAGNOSIS — J45.901 MODERATE ASTHMA WITH ACUTE EXACERBATION, UNSPECIFIED WHETHER PERSISTENT: Primary | ICD-10-CM

## 2023-08-07 PROCEDURE — 3008F BODY MASS INDEX DOCD: CPT | Performed by: INTERNAL MEDICINE

## 2023-08-07 PROCEDURE — 1111F DSCHRG MED/CURRENT MED MERGE: CPT | Performed by: INTERNAL MEDICINE

## 2023-08-07 PROCEDURE — 3074F SYST BP LT 130 MM HG: CPT | Performed by: INTERNAL MEDICINE

## 2023-08-07 PROCEDURE — 1160F RVW MEDS BY RX/DR IN RCRD: CPT | Performed by: INTERNAL MEDICINE

## 2023-08-07 PROCEDURE — 99214 OFFICE O/P EST MOD 30 MIN: CPT | Performed by: INTERNAL MEDICINE

## 2023-08-07 PROCEDURE — 1159F MED LIST DOCD IN RCRD: CPT | Performed by: INTERNAL MEDICINE

## 2023-08-07 PROCEDURE — 3078F DIAST BP <80 MM HG: CPT | Performed by: INTERNAL MEDICINE

## 2023-08-07 NOTE — PATIENT INSTRUCTIONS
Plan- continue Breo as discussed -consider daily or every other day          - to begin rescue inhaler or nebulizer  -- albuterol - 2 puffs every 4 hours as needed           - call with any issues or concerns         - see me  In 3 months   -    Karsten Gonsales MD  Pulmonary Medicine  8/7/2023

## 2023-08-08 ENCOUNTER — TELEPHONE (OUTPATIENT)
Dept: INTERNAL MEDICINE CLINIC | Facility: CLINIC | Age: 78
End: 2023-08-08

## 2023-08-08 NOTE — TELEPHONE ENCOUNTER
Paperwork was received from Liliam Albert [facility]#[id number]. It was placed on CS desk for review and signature.

## 2023-08-15 ENCOUNTER — TELEPHONE (OUTPATIENT)
Dept: INTERNAL MEDICINE CLINIC | Facility: CLINIC | Age: 78
End: 2023-08-15

## 2023-08-15 NOTE — TELEPHONE ENCOUNTER
Paperwork was received from Liliam Albert [facility]#[id number]. It was placed on Dr Herbie Longo desk for review and signature.

## 2023-08-15 NOTE — TELEPHONE ENCOUNTER
Paperwork was received from Liliam BREWER#[id number]. It was placed on Dr Correa [facility] desk for review and signature.

## 2023-08-17 ENCOUNTER — TELEPHONE (OUTPATIENT)
Dept: INTERNAL MEDICINE CLINIC | Facility: CLINIC | Age: 78
End: 2023-08-17

## 2023-08-17 NOTE — TELEPHONE ENCOUNTER
Paperwork was received from Liliam Albert [facility]#[id number]. It was placed on CS desk for review and signature.

## 2023-08-17 NOTE — TELEPHONE ENCOUNTER
Paperwork was received from Liliam Albert [facility]#[id number]. It was placed on CS desk for review and signature.

## 2023-08-17 NOTE — TELEPHONE ENCOUNTER
Paperwork was received from Liliam Albert [facility]#[id number]. It was placed on Dr Elías Eastman desk for review and signature.

## 2023-08-17 NOTE — TELEPHONE ENCOUNTER
Pt reports her hands, chest, neck and ears are burning and are red- sudden onset. Denies SOB, angioedema, or other resp sx. No new meds/soaps/detergents, etc. Advised to go to  and okay to take benadryl. Sedation warnings advised.

## 2023-08-17 NOTE — TELEPHONE ENCOUNTER
Pt stated she unplug a carbon monoxide device because its not working and shortly after developed a rash and her chest arms hands back is red and has a burning sensation.  Wants to know if she should take benadryl

## 2023-08-22 ENCOUNTER — TELEPHONE (OUTPATIENT)
Dept: INTERNAL MEDICINE CLINIC | Facility: CLINIC | Age: 78
End: 2023-08-22

## 2023-08-22 NOTE — TELEPHONE ENCOUNTER
Paperwork was received from [address] [place] [address]. It was placed on Dr Newton Kocher desk for review and signature. Niacinamide Counseling: I recommended taking niacin or niacinamide, also know as vitamin B3, twice daily. Recent evidence suggests that taking vitamin B3 (500 mg twice daily) can reduce the risk of actinic keratoses and non-melanoma skin cancers. Side effects of vitamin B3 include flushing and headache.

## 2023-08-31 ENCOUNTER — TELEPHONE (OUTPATIENT)
Facility: CLINIC | Age: 78
End: 2023-08-31

## 2023-08-31 NOTE — TELEPHONE ENCOUNTER
2 requests sent for clearance. Needs clearance letter. Per Dr. El Childers, call pt to see how she has been doing since last visit with her on 8-7-23.

## 2023-09-01 NOTE — TELEPHONE ENCOUNTER
Pt states that since her last visit, her asthma has been well controlled. She has been walking every day for exercise without problems. She is working with Anthony about the anesthesia she had there so that [facility] CHELSEY can use the same as she reports that at one time a long time ago, she had to go to ER after anesthesia. Clearance letter placed on Dr. Kylah Nunez desk for her to complete.

## 2023-09-05 ENCOUNTER — MED REC SCAN ONLY (OUTPATIENT)
Facility: CLINIC | Age: 78
End: 2023-09-05

## 2023-09-12 ENCOUNTER — TELEPHONE (OUTPATIENT)
Dept: INTERNAL MEDICINE CLINIC | Facility: CLINIC | Age: 78
End: 2023-09-12

## 2023-09-18 ENCOUNTER — TELEPHONE (OUTPATIENT)
Dept: INTERNAL MEDICINE CLINIC | Facility: CLINIC | Age: 78
End: 2023-09-18

## 2023-09-19 ENCOUNTER — OFFICE VISIT (OUTPATIENT)
Dept: INTERNAL MEDICINE CLINIC | Facility: CLINIC | Age: 78
End: 2023-09-19
Payer: MEDICARE

## 2023-09-19 VITALS
RESPIRATION RATE: 16 BRPM | SYSTOLIC BLOOD PRESSURE: 106 MMHG | OXYGEN SATURATION: 96 % | BODY MASS INDEX: 21.57 KG/M2 | WEIGHT: 100 LBS | HEIGHT: 57 IN | HEART RATE: 66 BPM | DIASTOLIC BLOOD PRESSURE: 64 MMHG

## 2023-09-19 DIAGNOSIS — R11.0 NAUSEA: ICD-10-CM

## 2023-09-19 DIAGNOSIS — R10.30 LOWER ABDOMINAL PAIN: Primary | ICD-10-CM

## 2023-09-19 DIAGNOSIS — R42 DIZZINESS: ICD-10-CM

## 2023-09-19 LAB
APPEARANCE: CLEAR
BILIRUBIN: NEGATIVE
GLUCOSE (URINE DIPSTICK): NEGATIVE MG/DL
KETONES (URINE DIPSTICK): NEGATIVE MG/DL
MULTISTIX LOT#: ABNORMAL NUMERIC
NITRITE, URINE: NEGATIVE
PH, URINE: 6 (ref 4.5–8)
PROTEIN (URINE DIPSTICK): NEGATIVE MG/DL
SPECIFIC GRAVITY: 1.01 (ref 1–1.03)
URINE-COLOR: YELLOW
UROBILINOGEN,SEMI-QN: 0.2 MG/DL (ref 0–1.9)

## 2023-09-19 PROCEDURE — 99214 OFFICE O/P EST MOD 30 MIN: CPT | Performed by: PHYSICIAN ASSISTANT

## 2023-09-19 PROCEDURE — 1159F MED LIST DOCD IN RCRD: CPT | Performed by: PHYSICIAN ASSISTANT

## 2023-09-19 PROCEDURE — 3078F DIAST BP <80 MM HG: CPT | Performed by: PHYSICIAN ASSISTANT

## 2023-09-19 PROCEDURE — 1160F RVW MEDS BY RX/DR IN RCRD: CPT | Performed by: PHYSICIAN ASSISTANT

## 2023-09-19 PROCEDURE — 3008F BODY MASS INDEX DOCD: CPT | Performed by: PHYSICIAN ASSISTANT

## 2023-09-19 PROCEDURE — 81003 URINALYSIS AUTO W/O SCOPE: CPT | Performed by: PHYSICIAN ASSISTANT

## 2023-09-19 PROCEDURE — 3074F SYST BP LT 130 MM HG: CPT | Performed by: PHYSICIAN ASSISTANT

## 2023-09-19 RX ORDER — MULTIVIT-MIN/IRON/FOLIC ACID/K 18-600-40
CAPSULE ORAL
COMMUNITY

## 2023-09-20 ENCOUNTER — HOSPITAL ENCOUNTER (OUTPATIENT)
Dept: CT IMAGING | Facility: HOSPITAL | Age: 78
Discharge: HOME | End: 2023-09-20
Attending: PHYSICIAN ASSISTANT
Payer: MEDICARE

## 2023-09-20 DIAGNOSIS — R10.30 LOWER ABDOMINAL PAIN: ICD-10-CM

## 2023-09-20 PROCEDURE — 74176 CT ABD & PELVIS W/O CONTRAST: CPT | Performed by: PHYSICIAN ASSISTANT

## 2023-09-28 ENCOUNTER — LAB ENCOUNTER (OUTPATIENT)
Dept: LAB | Facility: HOSPITAL | Age: 78
End: 2023-09-28
Attending: PHYSICIAN ASSISTANT
Payer: MEDICARE

## 2023-09-28 DIAGNOSIS — R42 DIZZINESS: ICD-10-CM

## 2023-09-28 DIAGNOSIS — R10.30 LOWER ABDOMINAL PAIN: ICD-10-CM

## 2023-09-28 LAB
ALBUMIN SERPL-MCNC: 3.9 G/DL (ref 3.4–5)
ALBUMIN/GLOB SERPL: 1.2 {RATIO} (ref 1–2)
ALP LIVER SERPL-CCNC: 80 U/L
ALT SERPL-CCNC: 25 U/L
ANION GAP SERPL CALC-SCNC: 2 MMOL/L (ref 0–18)
AST SERPL-CCNC: 23 U/L (ref 15–37)
BASOPHILS # BLD AUTO: 0.09 X10(3) UL (ref 0–0.2)
BASOPHILS NFR BLD AUTO: 1.9 %
BILIRUB SERPL-MCNC: 0.6 MG/DL (ref 0.1–2)
BUN BLD-MCNC: 15 MG/DL (ref 7–18)
CALCIUM BLD-MCNC: 9 MG/DL (ref 8.5–10.1)
CHLORIDE SERPL-SCNC: 108 MMOL/L (ref 98–112)
CO2 SERPL-SCNC: 31 MMOL/L (ref 21–32)
CREAT BLD-MCNC: 0.66 MG/DL
EGFRCR SERPLBLD CKD-EPI 2021: 89 ML/MIN/1.73M2 (ref 60–?)
EOSINOPHIL # BLD AUTO: 0.06 X10(3) UL (ref 0–0.7)
EOSINOPHIL NFR BLD AUTO: 1.3 %
ERYTHROCYTE [DISTWIDTH] IN BLOOD BY AUTOMATED COUNT: 13.5 %
FASTING STATUS PATIENT QL REPORTED: NO
GLOBULIN PLAS-MCNC: 3.2 G/DL (ref 2.8–4.4)
GLUCOSE BLD-MCNC: 90 MG/DL (ref 70–99)
HCT VFR BLD AUTO: 38.7 %
HGB BLD-MCNC: 13 G/DL
IMM GRANULOCYTES # BLD AUTO: 0.01 X10(3) UL (ref 0–1)
IMM GRANULOCYTES NFR BLD: 0.2 %
LYMPHOCYTES # BLD AUTO: 2.04 X10(3) UL (ref 1–4)
LYMPHOCYTES NFR BLD AUTO: 43.1 %
MCH RBC QN AUTO: 30.8 PG (ref 26–34)
MCHC RBC AUTO-ENTMCNC: 33.6 G/DL (ref 31–37)
MCV RBC AUTO: 91.7 FL
MONOCYTES # BLD AUTO: 0.23 X10(3) UL (ref 0.1–1)
MONOCYTES NFR BLD AUTO: 4.9 %
NEUTROPHILS # BLD AUTO: 2.3 X10 (3) UL (ref 1.5–7.7)
NEUTROPHILS # BLD AUTO: 2.3 X10(3) UL (ref 1.5–7.7)
NEUTROPHILS NFR BLD AUTO: 48.6 %
OSMOLALITY SERPL CALC.SUM OF ELEC: 292 MOSM/KG (ref 275–295)
PLATELET # BLD AUTO: 220 10(3)UL (ref 150–450)
POTASSIUM SERPL-SCNC: 3.9 MMOL/L (ref 3.5–5.1)
PROT SERPL-MCNC: 7.1 G/DL (ref 6.4–8.2)
RBC # BLD AUTO: 4.22 X10(6)UL
SODIUM SERPL-SCNC: 141 MMOL/L (ref 136–145)
T4 FREE SERPL-MCNC: 0.8 NG/DL (ref 0.8–1.7)
TSI SER-ACNC: 4.31 MIU/ML (ref 0.36–3.74)
VIT B12 SERPL-MCNC: 470 PG/ML (ref 193–986)
WBC # BLD AUTO: 4.7 X10(3) UL (ref 4–11)

## 2023-09-28 PROCEDURE — 84443 ASSAY THYROID STIM HORMONE: CPT

## 2023-09-28 PROCEDURE — 82607 VITAMIN B-12: CPT

## 2023-09-28 PROCEDURE — 80053 COMPREHEN METABOLIC PANEL: CPT

## 2023-09-28 PROCEDURE — 84439 ASSAY OF FREE THYROXINE: CPT

## 2023-09-28 PROCEDURE — 85025 COMPLETE CBC W/AUTO DIFF WBC: CPT

## 2023-09-28 PROCEDURE — 36415 COLL VENOUS BLD VENIPUNCTURE: CPT

## 2023-10-02 ENCOUNTER — OFFICE VISIT (OUTPATIENT)
Facility: CLINIC | Age: 78
End: 2023-10-02
Payer: MEDICARE

## 2023-10-02 VITALS
DIASTOLIC BLOOD PRESSURE: 64 MMHG | OXYGEN SATURATION: 99 % | HEART RATE: 69 BPM | HEIGHT: 57 IN | BODY MASS INDEX: 21.57 KG/M2 | SYSTOLIC BLOOD PRESSURE: 110 MMHG | WEIGHT: 100 LBS

## 2023-10-02 DIAGNOSIS — E03.9 HYPOTHYROIDISM, UNSPECIFIED TYPE: ICD-10-CM

## 2023-10-02 DIAGNOSIS — E06.3 HASHIMOTO'S DISEASE: Primary | ICD-10-CM

## 2023-10-02 PROCEDURE — 1159F MED LIST DOCD IN RCRD: CPT | Performed by: STUDENT IN AN ORGANIZED HEALTH CARE EDUCATION/TRAINING PROGRAM

## 2023-10-02 PROCEDURE — 3008F BODY MASS INDEX DOCD: CPT | Performed by: STUDENT IN AN ORGANIZED HEALTH CARE EDUCATION/TRAINING PROGRAM

## 2023-10-02 PROCEDURE — 3078F DIAST BP <80 MM HG: CPT | Performed by: STUDENT IN AN ORGANIZED HEALTH CARE EDUCATION/TRAINING PROGRAM

## 2023-10-02 PROCEDURE — 3074F SYST BP LT 130 MM HG: CPT | Performed by: STUDENT IN AN ORGANIZED HEALTH CARE EDUCATION/TRAINING PROGRAM

## 2023-10-02 PROCEDURE — 1160F RVW MEDS BY RX/DR IN RCRD: CPT | Performed by: STUDENT IN AN ORGANIZED HEALTH CARE EDUCATION/TRAINING PROGRAM

## 2023-10-02 PROCEDURE — 99203 OFFICE O/P NEW LOW 30 MIN: CPT | Performed by: STUDENT IN AN ORGANIZED HEALTH CARE EDUCATION/TRAINING PROGRAM

## 2023-10-02 RX ORDER — LEVOTHYROXINE SODIUM 25 MCG
25 TABLET ORAL
Qty: 90 TABLET | Refills: 0 | Status: SHIPPED | OUTPATIENT
Start: 2023-10-02

## 2023-10-03 ENCOUNTER — TELEPHONE (OUTPATIENT)
Dept: INTERNAL MEDICINE CLINIC | Facility: CLINIC | Age: 78
End: 2023-10-03

## 2023-10-06 ENCOUNTER — TELEPHONE (OUTPATIENT)
Dept: ADMINISTRATIVE | Age: 78
End: 2023-10-06

## 2023-10-06 DIAGNOSIS — L84 CALLUS OF FOOT: Primary | ICD-10-CM

## 2023-10-06 NOTE — TELEPHONE ENCOUNTER
Patient request a new referral to see Podiatry ana rosa,  Please review and sign plan and careif you agree Thank you. Adrianaga V/[facility] REFERRALS.

## 2023-10-24 ENCOUNTER — TELEPHONE (OUTPATIENT)
Facility: CLINIC | Age: 78
End: 2023-10-24

## 2023-10-24 NOTE — TELEPHONE ENCOUNTER
Received fax from [facility] gastroenterology requesting pulmonary clearance prior to patients procedure for EGD on 12/14/23. Patients last pulmonary office visit was August. Patient has follow up office visit scheduled 11/09/23. Form placed in Dr Marie Cooper in basket for review and to sign. Routing to dr [employer].

## 2023-11-15 ENCOUNTER — TELEPHONE (OUTPATIENT)
Facility: CLINIC | Age: 78
End: 2023-11-15

## 2023-11-15 NOTE — TELEPHONE ENCOUNTER
Call made to pt in regards to canceled appt for GI procedure clearance. LMV. Call made to JESUS MANUEL Huang from [facility]. SHANEKAM.

## 2023-11-16 ENCOUNTER — OFFICE VISIT (OUTPATIENT)
Dept: FAMILY MEDICINE CLINIC | Facility: CLINIC | Age: 78
End: 2023-11-16
Payer: MEDICARE

## 2023-11-16 VITALS
OXYGEN SATURATION: 100 % | RESPIRATION RATE: 16 BRPM | BODY MASS INDEX: 20.76 KG/M2 | DIASTOLIC BLOOD PRESSURE: 55 MMHG | WEIGHT: 103 LBS | HEIGHT: 59 IN | TEMPERATURE: 98 F | HEART RATE: 79 BPM | SYSTOLIC BLOOD PRESSURE: 118 MMHG

## 2023-11-16 DIAGNOSIS — J01.10 ACUTE FRONTAL SINUSITIS, RECURRENCE NOT SPECIFIED: Primary | ICD-10-CM

## 2023-11-16 PROCEDURE — 1159F MED LIST DOCD IN RCRD: CPT | Performed by: NURSE PRACTITIONER

## 2023-11-16 PROCEDURE — 3008F BODY MASS INDEX DOCD: CPT | Performed by: NURSE PRACTITIONER

## 2023-11-16 PROCEDURE — 1160F RVW MEDS BY RX/DR IN RCRD: CPT | Performed by: NURSE PRACTITIONER

## 2023-11-16 PROCEDURE — 3078F DIAST BP <80 MM HG: CPT | Performed by: NURSE PRACTITIONER

## 2023-11-16 PROCEDURE — 99213 OFFICE O/P EST LOW 20 MIN: CPT | Performed by: NURSE PRACTITIONER

## 2023-11-16 PROCEDURE — 3074F SYST BP LT 130 MM HG: CPT | Performed by: NURSE PRACTITIONER

## 2023-11-16 RX ORDER — AMOXICILLIN 500 MG/1
CAPSULE ORAL
Qty: 20 CAPSULE | Refills: 0 | Status: SHIPPED | OUTPATIENT
Start: 2023-11-16

## 2023-11-16 NOTE — TELEPHONE ENCOUNTER
Pt called per Dr. Jesica Loomis request. Pt states had to cancel EGD due to being sick for the last 2 weeks. Pt went to urgent care today for cold like symptoms and was prescribed amoxicillin 500 mg PO BID x 10 days. Pt hesitant to take it due to unsure if its bacterial or viral. Pt states cold like symptoms started 2 weeks ago. She had a low grade fever and her sats were 91-93% for about 3 days. They are good now. Pt reports having chest congestion and producing large amout of phlemg, but unable to spit it up. She states it goes to the back of her throat and has post nasal drip. Reports color was first brown and now yellow. C/O back pain. Had palpitations last night. Felt SOB and dizzy this morning and that's when she decided to go to urgent care. Her lungs sound clear but no other testing was performed. Pt now feels feverish. Asked to check temperature and to take covid test. Pt verbalized understanding. Pt reports temperature of 97.6 F. Unable to find covid test. Will let us know if she ends up doing it and if she's positive or has worsening symptoms. Pt advised if not feeling better, she should consider taking prescribed antibiotic. Pt verbalized understanding.

## 2023-11-17 ENCOUNTER — TELEPHONE (OUTPATIENT)
Facility: CLINIC | Age: 78
End: 2023-11-17

## 2023-11-17 NOTE — TELEPHONE ENCOUNTER
Spoke with Michelle from GI. Requesting updated clearance letter. Michelle informed pt is sick and not feeling well, unsure if Dr. Chari Verduzco would clear her but will discuss with her and contact GI once I have an answer. Pt scheduled for 12/14. Routing TE to Dr. Chari Verduzco.

## 2023-11-20 ENCOUNTER — OFFICE VISIT (OUTPATIENT)
Facility: CLINIC | Age: 78
End: 2023-11-20
Payer: MEDICARE

## 2023-11-20 ENCOUNTER — TELEPHONE (OUTPATIENT)
Facility: CLINIC | Age: 78
End: 2023-11-20

## 2023-11-20 VITALS
RESPIRATION RATE: 16 BRPM | OXYGEN SATURATION: 98 % | DIASTOLIC BLOOD PRESSURE: 60 MMHG | WEIGHT: 101 LBS | BODY MASS INDEX: 20.36 KG/M2 | HEART RATE: 83 BPM | HEIGHT: 59 IN | SYSTOLIC BLOOD PRESSURE: 110 MMHG

## 2023-11-20 DIAGNOSIS — J01.10 ACUTE FRONTAL SINUSITIS, RECURRENCE NOT SPECIFIED: ICD-10-CM

## 2023-11-20 DIAGNOSIS — J45.901 MODERATE ASTHMA WITH ACUTE EXACERBATION, UNSPECIFIED WHETHER PERSISTENT: Primary | ICD-10-CM

## 2023-11-20 PROCEDURE — 99214 OFFICE O/P EST MOD 30 MIN: CPT | Performed by: NURSE PRACTITIONER

## 2023-11-20 PROCEDURE — 3078F DIAST BP <80 MM HG: CPT | Performed by: NURSE PRACTITIONER

## 2023-11-20 PROCEDURE — 1160F RVW MEDS BY RX/DR IN RCRD: CPT | Performed by: NURSE PRACTITIONER

## 2023-11-20 PROCEDURE — 3074F SYST BP LT 130 MM HG: CPT | Performed by: NURSE PRACTITIONER

## 2023-11-20 PROCEDURE — 1159F MED LIST DOCD IN RCRD: CPT | Performed by: NURSE PRACTITIONER

## 2023-11-20 PROCEDURE — 3008F BODY MASS INDEX DOCD: CPT | Performed by: NURSE PRACTITIONER

## 2023-11-20 RX ORDER — FLUTICASONE FUROATE AND VILANTEROL 100; 25 UG/1; UG/1
1 POWDER RESPIRATORY (INHALATION) DAILY
Qty: 1 EACH | Refills: 5 | Status: SHIPPED | OUTPATIENT
Start: 2023-11-20

## 2023-11-20 RX ORDER — ALBUTEROL SULFATE 90 UG/1
2 AEROSOL, METERED RESPIRATORY (INHALATION) EVERY 6 HOURS PRN
COMMUNITY

## 2023-11-20 NOTE — TELEPHONE ENCOUNTER
Pt requesting blood work and a respiratory panel to see if her cold like symptoms are viral or bacterial. Pt still not feeling any better. Advised to take prescribed antibiotics. Pt hesitant to take prescribed antibiotics prescribed last week at urgent care. Pt called, no answer. Advised via [facility] to set up appt with Meme CEBALLOS either in person or via telehealth today. Message routed to Dr. Maher and Meme CEBALLOS.

## 2023-11-20 NOTE — PATIENT INSTRUCTIONS
Recommend to take antibiotics as directed  Recommend to continue george pot and nasal rinses  Recommend to restart Breo as directed  Followup in 2 weeks; telehealth is ok  Please contact office at [phone] with any decline in respiratory status, questions or concerns

## 2023-11-21 ENCOUNTER — TELEPHONE (OUTPATIENT)
Facility: CLINIC | Age: 78
End: 2023-11-21

## 2023-11-28 ENCOUNTER — TELEPHONE (OUTPATIENT)
Facility: CLINIC | Age: 78
End: 2023-11-28

## 2023-11-28 NOTE — TELEPHONE ENCOUNTER
Pt called. States still not feeling well. Still has malaise. States coughing frequently at night. Something tickles her throat. Denies fever. States having a rash to neck, back and elbows. Denies trying new foods. States has not been taking her vitamins, afraid those may be the cause. Pt re-started using her Breo inhaler 3 days ago. States rash started 2 weeks ago, was better and then re-started again. C/O headache and itchy eyes. Has not taken antibiotics. Routing TE to Callie CEBALLOS and Dr. Samara Poon.

## 2023-11-29 ENCOUNTER — OFFICE VISIT (OUTPATIENT)
Facility: CLINIC | Age: 78
End: 2023-11-29
Payer: MEDICARE

## 2023-11-29 VITALS
OXYGEN SATURATION: 97 % | SYSTOLIC BLOOD PRESSURE: 120 MMHG | RESPIRATION RATE: 16 BRPM | DIASTOLIC BLOOD PRESSURE: 62 MMHG | HEART RATE: 79 BPM | BODY MASS INDEX: 20 KG/M2 | HEIGHT: 59 IN

## 2023-11-29 DIAGNOSIS — J01.10 ACUTE FRONTAL SINUSITIS, RECURRENCE NOT SPECIFIED: ICD-10-CM

## 2023-11-29 DIAGNOSIS — J45.901 MODERATE ASTHMA WITH ACUTE EXACERBATION, UNSPECIFIED WHETHER PERSISTENT: Primary | ICD-10-CM

## 2023-11-29 DIAGNOSIS — R06.09 DYSPNEA ON EXERTION: ICD-10-CM

## 2023-11-29 PROCEDURE — 1159F MED LIST DOCD IN RCRD: CPT | Performed by: NURSE PRACTITIONER

## 2023-11-29 PROCEDURE — 1160F RVW MEDS BY RX/DR IN RCRD: CPT | Performed by: NURSE PRACTITIONER

## 2023-11-29 PROCEDURE — 99214 OFFICE O/P EST MOD 30 MIN: CPT | Performed by: NURSE PRACTITIONER

## 2023-11-29 PROCEDURE — 3078F DIAST BP <80 MM HG: CPT | Performed by: NURSE PRACTITIONER

## 2023-11-29 PROCEDURE — 3074F SYST BP LT 130 MM HG: CPT | Performed by: NURSE PRACTITIONER

## 2023-11-29 NOTE — PATIENT INSTRUCTIONS
Recommend to take antibiotics as directed  Recommend to continue george pot and nasal rinses  Recommend to continue Breo daily and albuterol as directed for  shortness of breath  Followup in 2 weeks; telehealth is ok  Please contact office at [phone] with any decline in respiratory status, questions or concerns

## 2023-12-08 ENCOUNTER — TELEPHONE (OUTPATIENT)
Dept: INTERNAL MEDICINE CLINIC | Facility: CLINIC | Age: 78
End: 2023-12-08

## 2023-12-08 DIAGNOSIS — J45.909 UNCOMPLICATED ASTHMA, UNSPECIFIED ASTHMA SEVERITY, UNSPECIFIED WHETHER PERSISTENT (HCC): Primary | ICD-10-CM

## 2023-12-08 NOTE — TELEPHONE ENCOUNTER
Patient states she is having left sided chest pain, BP is more elevated than her usual BP, typically 102/60, having SOB with exertion.  Pt notified she will need to go to the ER for evaluation. Pt states she is calling Bridgett [facility] as well for her opinion.  Pt verbalizes understanding. LOV 5/30/23 with AD.

## 2023-12-08 NOTE — TELEPHONE ENCOUNTER
Pain in left side under arm, Bp is 135/66, patient is having SOB off and on, worse with exertion.

## 2023-12-08 NOTE — TELEPHONE ENCOUNTER
Specialty:  Pulmonary    Full Name of Specialist:  Bridgett MENDES    Name of the Provider Group:  Address: [facility] [place], [address] [address]   Phone number:  Fax number :  NPI:  [id number] Dr. Maher supervising MD    (NPI number of the service provider.  the nurses need this information for the referral.  Its for service providers only like Surgery*, [employer], [facility], Etc.  We not NOT need physician NPI's)    *Surgery:The NPI # should be of the location of the Surgery.    Date of Appointment:  12/14/23    Reason for the Appointment (be specific with diagnosis code):  Asthma    Has the patient seen a provider in our office for stated problem?:  yes    Is this request for an out of network referral?   (if yes, please have patient contact referring provider and have them fax office visit notes to triage attention):

## 2023-12-11 NOTE — TELEPHONE ENCOUNTER
Called and spoke w/ pt. Pt stated she did not go to ER 12/8 as recommended by Linda. Pt stated she has gone to ER for this before and they \"make her feel like she is making this up\" and she feels \"dismissed\" when she goes to ER so she did not go. Pt stated for one month has been having fatigue, SOB and chest heaviness. BP yesterday was 114/55, has not taken today. Tested a while ago for Covid and was negative. Denies current fever. Stated she has dry cough at night time. Stated Breo ellipta inhaler makes her more SOB and increased chest heaviness, stated it will \"maybe make her feel a little better\". Scheduled OV tomorrow with MP (saw MP 6/13/23 for chest pain and chronic SOB). Advised pt to test again today for Covid, notified to call us if it's positive. Pt verbalizes understanding. Provided pt with strong ER precautions. Pt agreeable to plan.     HEIDII FANNY and will also route to AD PCP as RAMSES

## 2023-12-11 NOTE — TELEPHONE ENCOUNTER
Patient calling again states she has been sick for over 1 month with her asthma, sob, and chest heavyness.  Pt is scheduled to see Pulm on 12/14/23 but wants to see AD also.    Pt is scheduled for cpe with AD.    Future Appointments   Date Time Provider Department Center   12/14/2023  1:30 PM Bridgett Chapman APRN  [facility]   1/16/2024 10:00 AM Alejandro Serrano MD [facility]   3/4/2024 11:20 AM Micheal Clifford MD [facility]

## 2023-12-12 NOTE — TELEPHONE ENCOUNTER
Per Charito Presley:    Yes so sites were supposed to be notified that In Network Providers referring [employer] patients to In Network Specialists.. will now Auto Approve per [employer]. If there are any issues with the Specialist office accepting them, let me know and I can still run an Auth # through [employer]. This just started last week.

## 2023-12-14 ENCOUNTER — TELEMEDICINE (OUTPATIENT)
Facility: CLINIC | Age: 78
End: 2023-12-14
Payer: MEDICARE

## 2023-12-14 DIAGNOSIS — J01.10 ACUTE FRONTAL SINUSITIS, RECURRENCE NOT SPECIFIED: ICD-10-CM

## 2023-12-14 DIAGNOSIS — J45.901 MODERATE ASTHMA WITH ACUTE EXACERBATION, UNSPECIFIED WHETHER PERSISTENT: Primary | ICD-10-CM

## 2023-12-14 PROCEDURE — 1159F MED LIST DOCD IN RCRD: CPT | Performed by: NURSE PRACTITIONER

## 2023-12-14 PROCEDURE — 99214 OFFICE O/P EST MOD 30 MIN: CPT | Performed by: NURSE PRACTITIONER

## 2023-12-14 PROCEDURE — 1160F RVW MEDS BY RX/DR IN RCRD: CPT | Performed by: NURSE PRACTITIONER
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
## 2023-12-14 NOTE — PATIENT INSTRUCTIONS
Recommend to continue george pot and nasal rinses  Recommend to continue Breo daily and albuterol as directed for  shortness of breath  Followup in 4 weeks; telehealth is ok

## 2023-12-28 ENCOUNTER — OFFICE VISIT (OUTPATIENT)
Dept: INTERNAL MEDICINE CLINIC | Facility: CLINIC | Age: 78
End: 2023-12-28
Payer: MEDICARE

## 2023-12-28 VITALS
OXYGEN SATURATION: 98 % | TEMPERATURE: 99 F | SYSTOLIC BLOOD PRESSURE: 100 MMHG | HEART RATE: 78 BPM | WEIGHT: 103.63 LBS | BODY MASS INDEX: 21 KG/M2 | DIASTOLIC BLOOD PRESSURE: 50 MMHG

## 2023-12-28 DIAGNOSIS — R51.9 CHRONIC NONINTRACTABLE HEADACHE, UNSPECIFIED HEADACHE TYPE: ICD-10-CM

## 2023-12-28 DIAGNOSIS — M54.50 ACUTE BILATERAL LOW BACK PAIN WITHOUT SCIATICA: ICD-10-CM

## 2023-12-28 DIAGNOSIS — J45.40 MODERATE PERSISTENT ASTHMA WITHOUT COMPLICATION: ICD-10-CM

## 2023-12-28 DIAGNOSIS — Z88.9 MULTIPLE ALLERGIES: Primary | ICD-10-CM

## 2023-12-28 DIAGNOSIS — M81.0 AGE-RELATED OSTEOPOROSIS WITHOUT CURRENT PATHOLOGICAL FRACTURE: ICD-10-CM

## 2023-12-28 DIAGNOSIS — R21 RASH AND NONSPECIFIC SKIN ERUPTION: ICD-10-CM

## 2023-12-28 DIAGNOSIS — R22.0 TONGUE SWELLING: ICD-10-CM

## 2023-12-28 DIAGNOSIS — G89.29 CHRONIC NONINTRACTABLE HEADACHE, UNSPECIFIED HEADACHE TYPE: ICD-10-CM

## 2023-12-28 DIAGNOSIS — M15.9 PRIMARY OSTEOARTHRITIS INVOLVING MULTIPLE JOINTS: ICD-10-CM

## 2023-12-28 PROCEDURE — 1159F MED LIST DOCD IN RCRD: CPT | Performed by: INTERNAL MEDICINE

## 2023-12-28 PROCEDURE — 3074F SYST BP LT 130 MM HG: CPT | Performed by: INTERNAL MEDICINE

## 2023-12-28 PROCEDURE — 3078F DIAST BP <80 MM HG: CPT | Performed by: INTERNAL MEDICINE

## 2023-12-28 PROCEDURE — 99214 OFFICE O/P EST MOD 30 MIN: CPT | Performed by: INTERNAL MEDICINE

## 2023-12-28 PROCEDURE — 1160F RVW MEDS BY RX/DR IN RCRD: CPT | Performed by: INTERNAL MEDICINE

## 2023-12-28 RX ORDER — EPINEPHRINE 0.3 MG/.3ML
0.3 INJECTION SUBCUTANEOUS ONCE
Qty: 1 EACH | Refills: 0 | Status: SHIPPED | OUTPATIENT
Start: 2023-12-28 | End: 2023-12-28

## 2023-12-29 ENCOUNTER — TELEPHONE (OUTPATIENT)
Dept: INTERNAL MEDICINE CLINIC | Facility: CLINIC | Age: 78
End: 2023-12-29

## 2023-12-29 NOTE — TELEPHONE ENCOUNTER
Pt is requesting a delay in Physical therapy until after January 2,2024 They will need a new order stating the new date.

## 2024-01-08 ENCOUNTER — TELEPHONE (OUTPATIENT)
Dept: INTERNAL MEDICINE CLINIC | Facility: CLINIC | Age: 79
End: 2024-01-08

## 2024-01-08 NOTE — TELEPHONE ENCOUNTER
Patient states she has couple of issues to discuss with MP at appt scheduled for 1/9/24 at 3pm.  Pt states she is having right knee pain, heard a click in her knee and cannot walk without pain now. Pt  states her left arm has been hurting and hard to move but now can lift somewhat better now.  Pt is also having burning of her lips and cheeks on and off, states she will take Benadryl as she has had this reaction before.  Pt notified if she has swelling of her tongue/throat she will need to go to the ER for evaluation. Pt still insists that she will go to the ER if the Benadryl use does not show improvement. Pt states she will still come to see MP tomorrow.  Pt verbalizes understanding.  RAMSES to FANNY

## 2024-01-08 NOTE — TELEPHONE ENCOUNTER
Pt stated that she is having right knee pain didn't hurt herself. She stated that her lips and her mouth is burning like if she ate something hot it's been on and off. Her bones hurt and she can't lift her left arm 3 days ago

## 2024-01-08 NOTE — TELEPHONE ENCOUNTER
Asuncion from [facility],  looking for verbal order to move hh visit from today to next Monday - this is for PT.

## 2024-01-08 NOTE — TELEPHONE ENCOUNTER
LOV 12/28/23 with MP.    Future appt with MP for 1/9/24 for knee pain and left arm immobility issue.  Home Health order from 12/28/23 by MP.  OK to move appt for PT with Home Health?

## 2024-01-09 NOTE — TELEPHONE ENCOUNTER
Called and spoke w/ pt. Pt stated she is worried about coming into the office because of the weather and it's hard for her to walk with her knee pain. Stated she doesn't have anyone to help take her to apt and has to take an uber here. Stated her son works and is sick right now. Pt stated she has been trying to contact her  because she needs a caregiver to help with these things. Stated the tingling sensation is not new. Pt stated she is always SOB but feels ok right now. Rescheduled apt to tomorrow with FANNY 1/10/24. Pt agreeable to plan.     RAMSES ESCOBEDO

## 2024-01-09 NOTE — TELEPHONE ENCOUNTER
Pt stated she's not going to make it today due to the weather and instead of seeing MP she wants to be referred to Rheum or Ortho instead for her knee and arm pain.    Pt has tingling on her whole body - scalp, head and ears  - she will call back and make an appt for this.    Pt will check with her insurance to find someone in network and call us back.

## 2024-01-10 ENCOUNTER — TELEPHONE (OUTPATIENT)
Dept: INTERNAL MEDICINE CLINIC | Facility: CLINIC | Age: 79
End: 2024-01-10

## 2024-01-10 NOTE — TELEPHONE ENCOUNTER
1st no show appt.  Pt  stated she didn't know.  I spoke with pt and pt scheduled with nurse.  Pt rescheduled on below  No show letter sent.    Future Appointments   Date Time Provider Department Center   1/12/2024 11:00 AM Francis Taylor, DO [facility]

## 2024-01-15 ENCOUNTER — TELEPHONE (OUTPATIENT)
Dept: INTERNAL MEDICINE CLINIC | Facility: CLINIC | Age: 79
End: 2024-01-15

## 2024-01-15 NOTE — TELEPHONE ENCOUNTER
Pt has order for PT but pt wants to wait see her doctors first before she starts home health, pt stated she will reach out to Home Health agency when she is ready to start care after further discussing with AD.

## 2024-01-15 NOTE — TELEPHONE ENCOUNTER
RAMSES AD. Pt was getting PT for knee pain and L arm immobility issue. Not scheduled until 3/4.     Pt was scheduled on 1/12 with MP but was cancelled due to weather. Please offer sooner appt.

## 2024-01-18 ENCOUNTER — HOSPITAL ENCOUNTER (OUTPATIENT)
Facility: HOSPITAL | Age: 79
Setting detail: OBSERVATION
Discharge: HOME | End: 2024-01-19
Attending: EMERGENCY MEDICINE | Admitting: INTERNAL MEDICINE
Payer: MEDICARE

## 2024-01-18 ENCOUNTER — APPOINTMENT (OUTPATIENT)
Dept: CV DIAGNOSTICS | Facility: HOSPITAL | Age: 79
End: 2024-01-18
Attending: INTERNAL MEDICINE
Payer: MEDICARE

## 2024-01-18 ENCOUNTER — APPOINTMENT (OUTPATIENT)
Dept: MRI IMAGING | Facility: HOSPITAL | Age: 79
End: 2024-01-18
Attending: INTERNAL MEDICINE
Payer: MEDICARE

## 2024-01-18 ENCOUNTER — APPOINTMENT (OUTPATIENT)
Dept: GENERAL RADIOLOGY | Facility: HOSPITAL | Age: 79
End: 2024-01-18
Attending: INTERNAL MEDICINE
Payer: MEDICARE

## 2024-01-18 ENCOUNTER — APPOINTMENT (OUTPATIENT)
Dept: GENERAL RADIOLOGY | Facility: HOSPITAL | Age: 79
End: 2024-01-18
Attending: EMERGENCY MEDICINE
Payer: MEDICARE

## 2024-01-18 ENCOUNTER — APPOINTMENT (OUTPATIENT)
Dept: CT IMAGING | Facility: HOSPITAL | Age: 79
End: 2024-01-18
Attending: EMERGENCY MEDICINE
Payer: MEDICARE

## 2024-01-18 DIAGNOSIS — G96.198 EXTRADURAL CYST OF SPINE: Primary | ICD-10-CM

## 2024-01-18 DIAGNOSIS — R07.9 CHEST PAIN OF UNCERTAIN ETIOLOGY: ICD-10-CM

## 2024-01-18 LAB
ALBUMIN SERPL-MCNC: 3.7 G/DL (ref 3.4–5)
ALBUMIN/GLOB SERPL: 1.2 {RATIO} (ref 1–2)
ALP LIVER SERPL-CCNC: 71 U/L
ALT SERPL-CCNC: 18 U/L
ANION GAP SERPL CALC-SCNC: 4 MMOL/L (ref 0–18)
AST SERPL-CCNC: 16 U/L (ref 15–37)
ATRIAL RATE: 73 BPM
BASOPHILS # BLD AUTO: 0.06 X10(3) UL (ref 0–0.2)
BASOPHILS NFR BLD AUTO: 1 %
BILIRUB SERPL-MCNC: 0.4 MG/DL (ref 0.1–2)
BUN BLD-MCNC: 22 MG/DL (ref 9–23)
CALCIUM BLD-MCNC: 8.7 MG/DL (ref 8.5–10.1)
CHLORIDE SERPL-SCNC: 109 MMOL/L (ref 98–112)
CO2 SERPL-SCNC: 28 MMOL/L (ref 21–32)
CREAT BLD-MCNC: 0.87 MG/DL
CRP SERPL-MCNC: <0.29 MG/DL (ref ?–0.3)
D DIMER PPP FEU-MCNC: <0.27 UG/ML FEU (ref ?–0.8)
EGFRCR SERPLBLD CKD-EPI 2021: 67 ML/MIN/1.73M2 (ref 60–?)
EOSINOPHIL # BLD AUTO: 0.09 X10(3) UL (ref 0–0.7)
EOSINOPHIL NFR BLD AUTO: 1.6 %
ERYTHROCYTE [DISTWIDTH] IN BLOOD BY AUTOMATED COUNT: 13.4 %
ERYTHROCYTE [SEDIMENTATION RATE] IN BLOOD: 9 MM/HR
GLOBULIN PLAS-MCNC: 3.1 G/DL (ref 2.8–4.4)
GLUCOSE BLD-MCNC: 105 MG/DL (ref 70–99)
HCT VFR BLD AUTO: 36.9 %
HGB BLD-MCNC: 12.3 G/DL
IMM GRANULOCYTES # BLD AUTO: 0.01 X10(3) UL (ref 0–1)
IMM GRANULOCYTES NFR BLD: 0.2 %
LIPASE SERPL-CCNC: 108 U/L (ref 13–75)
LYMPHOCYTES # BLD AUTO: 2.5 X10(3) UL (ref 1–4)
LYMPHOCYTES NFR BLD AUTO: 43.2 %
MCH RBC QN AUTO: 30.8 PG (ref 26–34)
MCHC RBC AUTO-ENTMCNC: 33.3 G/DL (ref 31–37)
MCV RBC AUTO: 92.5 FL
MONOCYTES # BLD AUTO: 0.39 X10(3) UL (ref 0.1–1)
MONOCYTES NFR BLD AUTO: 6.7 %
NEUTROPHILS # BLD AUTO: 2.74 X10 (3) UL (ref 1.5–7.7)
NEUTROPHILS # BLD AUTO: 2.74 X10(3) UL (ref 1.5–7.7)
NEUTROPHILS NFR BLD AUTO: 47.3 %
OSMOLALITY SERPL CALC.SUM OF ELEC: 296 MOSM/KG (ref 275–295)
P AXIS: 74 DEGREES
P-R INTERVAL: 166 MS
PLATELET # BLD AUTO: 205 10(3)UL (ref 150–450)
POTASSIUM SERPL-SCNC: 4 MMOL/L (ref 3.5–5.1)
PROT SERPL-MCNC: 6.8 G/DL (ref 6.4–8.2)
Q-T INTERVAL: 400 MS
QRS DURATION: 82 MS
QTC CALCULATION (BEZET): 440 MS
R AXIS: 48 DEGREES
RBC # BLD AUTO: 3.99 X10(6)UL
SARS-COV-2 RNA RESP QL NAA+PROBE: NOT DETECTED
SODIUM SERPL-SCNC: 141 MMOL/L (ref 136–145)
T AXIS: 68 DEGREES
TROPONIN I SERPL HS-MCNC: 3 NG/L
TROPONIN I SERPL HS-MCNC: 4 NG/L
TROPONIN I SERPL HS-MCNC: 4 NG/L
VENTRICULAR RATE: 73 BPM
WBC # BLD AUTO: 5.8 X10(3) UL (ref 4–11)

## 2024-01-18 PROCEDURE — 99223 1ST HOSP IP/OBS HIGH 75: CPT | Performed by: INTERNAL MEDICINE

## 2024-01-18 PROCEDURE — 71045 X-RAY EXAM CHEST 1 VIEW: CPT | Performed by: EMERGENCY MEDICINE

## 2024-01-18 PROCEDURE — 72190 X-RAY EXAM OF PELVIS: CPT | Performed by: INTERNAL MEDICINE

## 2024-01-18 PROCEDURE — 72100 X-RAY EXAM L-S SPINE 2/3 VWS: CPT | Performed by: INTERNAL MEDICINE

## 2024-01-18 PROCEDURE — 72040 X-RAY EXAM NECK SPINE 2-3 VW: CPT | Performed by: INTERNAL MEDICINE

## 2024-01-18 PROCEDURE — 93306 TTE W/DOPPLER COMPLETE: CPT | Performed by: INTERNAL MEDICINE

## 2024-01-18 PROCEDURE — 72072 X-RAY EXAM THORAC SPINE 3VWS: CPT | Performed by: INTERNAL MEDICINE

## 2024-01-18 PROCEDURE — 72148 MRI LUMBAR SPINE W/O DYE: CPT | Performed by: INTERNAL MEDICINE

## 2024-01-18 PROCEDURE — 74176 CT ABD & PELVIS W/O CONTRAST: CPT | Performed by: EMERGENCY MEDICINE

## 2024-01-18 RX ORDER — POLYETHYLENE GLYCOL 3350 17 G/17G
17 POWDER, FOR SOLUTION ORAL DAILY PRN
Status: DISCONTINUED | OUTPATIENT
Start: 2024-01-18 | End: 2024-01-19

## 2024-01-18 RX ORDER — ASPIRIN 325 MG
325 TABLET ORAL DAILY
Status: DISCONTINUED | OUTPATIENT
Start: 2024-01-18 | End: 2024-01-19

## 2024-01-18 RX ORDER — ENOXAPARIN SODIUM 100 MG/ML
40 INJECTION SUBCUTANEOUS DAILY
Status: DISCONTINUED | OUTPATIENT
Start: 2024-01-18 | End: 2024-01-19

## 2024-01-18 RX ORDER — ASPIRIN 81 MG/1
324 TABLET, CHEWABLE ORAL ONCE
Status: COMPLETED | OUTPATIENT
Start: 2024-01-18 | End: 2024-01-18

## 2024-01-18 RX ORDER — LEVOTHYROXINE SODIUM 0.03 MG/1
25 TABLET ORAL
Status: DISCONTINUED | OUTPATIENT
Start: 2024-01-18 | End: 2024-01-19

## 2024-01-18 RX ORDER — KETOROLAC TROMETHAMINE 15 MG/ML
15 INJECTION, SOLUTION INTRAMUSCULAR; INTRAVENOUS ONCE
Status: DISCONTINUED | OUTPATIENT
Start: 2024-01-18 | End: 2024-01-19

## 2024-01-18 RX ORDER — NITROGLYCERIN 0.4 MG/1
0.4 TABLET SUBLINGUAL EVERY 5 MIN PRN
Status: DISCONTINUED | OUTPATIENT
Start: 2024-01-18 | End: 2024-01-19

## 2024-01-18 RX ORDER — BISACODYL 10 MG
10 SUPPOSITORY, RECTAL RECTAL
Status: DISCONTINUED | OUTPATIENT
Start: 2024-01-18 | End: 2024-01-19

## 2024-01-18 RX ORDER — ALBUTEROL SULFATE 90 UG/1
2 AEROSOL, METERED RESPIRATORY (INHALATION) EVERY 6 HOURS PRN
Status: DISCONTINUED | OUTPATIENT
Start: 2024-01-18 | End: 2024-01-19

## 2024-01-18 RX ORDER — MELATONIN
3 NIGHTLY PRN
Status: DISCONTINUED | OUTPATIENT
Start: 2024-01-18 | End: 2024-01-19

## 2024-01-18 RX ORDER — ACETAMINOPHEN 500 MG
500 TABLET ORAL EVERY 4 HOURS PRN
Status: DISCONTINUED | OUTPATIENT
Start: 2024-01-18 | End: 2024-01-19

## 2024-01-18 RX ORDER — DOCUSATE SODIUM 100 MG/1
100 CAPSULE, LIQUID FILLED ORAL 2 TIMES DAILY PRN
Status: DISCONTINUED | OUTPATIENT
Start: 2024-01-18 | End: 2024-01-19

## 2024-01-18 RX ORDER — FLUTICASONE FUROATE AND VILANTEROL 100; 25 UG/1; UG/1
1 POWDER RESPIRATORY (INHALATION) DAILY
Status: DISCONTINUED | OUTPATIENT
Start: 2024-01-18 | End: 2024-01-19

## 2024-01-18 RX ORDER — ONDANSETRON 2 MG/ML
4 INJECTION INTRAMUSCULAR; INTRAVENOUS ONCE
Status: DISCONTINUED | OUTPATIENT
Start: 2024-01-18 | End: 2024-01-19

## 2024-01-18 RX ORDER — ONDANSETRON 2 MG/ML
4 INJECTION INTRAMUSCULAR; INTRAVENOUS EVERY 6 HOURS PRN
Status: DISCONTINUED | OUTPATIENT
Start: 2024-01-18 | End: 2024-01-19

## 2024-01-18 NOTE — ED INITIAL ASSESSMENT (HPI)
Bilateral knee and hip pain getting worse over past few months. States it is very difficult to walk and is very painful.
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
## 2024-01-18 NOTE — PLAN OF CARE
Assumed patient care this morning.   Alert, awake. Breathing unlabored. C/o generalized pain. Tolerating oral intake. Ambulating with assist. Seen by Rheum-multiple test ordered. Recommendations from rheum relayed to Dr. Agustin, will await test results for further management. Cardiology notified of consult.

## 2024-01-18 NOTE — PLAN OF CARE
NURSING ADMISSION NOTE      Patient admitted via cart.  Admit navigator done.  Oriented to room.  Safety precautions initiated.  Bed in low position.  Call light in reach.  Pt AOX4.  Chronic on and off chest pain per patient.  Per patient, she came in because of left hip and bilateral knees pain and difficulty walking.She lives alone and uses crutches.
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
## 2024-01-18 NOTE — ED QUICK NOTES
Orders for admission, patient is aware of plan and ready to go upstairs. Any questions, please call ED RN Audra at extension [phone].     Patient Covid vaccination status: Unvaccinated     COVID Test Ordered in ED: Rapid SARS-CoV-2 by PCR    COVID Suspicion at Admission: N/A    Running Infusions:  None    Mental Status/LOC at time of transport: A&Ox3    Other pertinent information:   CIWA score: N/A   NIH score:  N/A

## 2024-01-18 NOTE — PHYSICAL THERAPY NOTE
PHYSICAL THERAPY EVALUATION - INPATIENT     Room Number: 0013/0013-A  Evaluation Date: 1/18/2024  Type of Evaluation: Initial  Physician Order: PT Eval and Treat    Presenting Problem: diffuse body pain, burning  Co-Morbidities : DM, HTN, HLD, fibromyalgia  Reason for Therapy: Mobility Dysfunction and Discharge Planning    History related to current admission: Patient is a 78 year old female admitted on 1/18/2024 from home for diffuse body pain (L hip, R side of jaw, RUE pain) and intense burning throughout.      Recent admissions:   7/17-7/19/23: SOB/syncopal episode --> rec [facility]; dc home  ASSESSMENT   In this PT evaluation, the patient presents with the following impairments incr pain/discomfort, decr static and dynamic standing balance, hypersensitivity to touch, decr activity tolerance/endurance, decr functional mobility.  These impairments and comorbidities manifest themselves as functional limitations in independent bed mobility, transfers, and gait.  The patient is below baseline and would benefit from skilled inpatient PT to address the above deficits to assist patient in returning to prior to level of function.   Functional outcome measures completed include [facility].  The -PAC '6-Clicks' Inpatient Basic Mobility Short Form was completed and this patient is demonstrating a Approx Degree of Impairment: 46.58%  degree of impairment in mobility. Research supports that patients with this level of impairment may benefit from HHPT.   DISCHARGE RECOMMENDATIONS  PT Discharge Recommendations: Home with home health PT    PLAN  PT Treatment Plan: Bed mobility;Body mechanics;Coordination;Endurance;Energy conservation;Patient education;Family education;Gait training;Range of motion;Neuromuscular re-educate;Strengthening;Stoop training;Stair training;Transfer training;Balance training  Rehab Potential : Good  Frequency (Obs): 3-5x/week  Number of Visits to Meet Established Goals: 3      CURRENT GOALS    Goal #1  Patient is able to demonstrate supine - sit EOB @ level: independent     Goal #2 Patient is able to demonstrate transfers Sit to/from Stand at assistance level: independent     Goal #3 Patient is able to ambulate 50 feet with assist device: walker - rolling at assistance level: modified independent     Goal #4 Pt able to ambulate 150 feet w/ RW at [place].    Goal #5    Goal #6    Goal Comments: Goals established on 2024    HOME SITUATION  Type of Home: Apartment   Home Layout: One level  Stairs to Enter : 0             Lives With: Alone  Drives: Yes  Patient Owned Equipment: Rolling walker;Crutches;Wheelchair  Patient Regularly Uses: Reading glasses    Prior Level of [place]: Per pt lives in one story apartment alone. Typically ambulates with single crutch, but does own RW/cane/and wheelchair. No recent hx of falls. Has groceries delivered to home. Has experienced this type of pain before.    SUBJECTIVE  \"I literally am screaming in pain!\"      OBJECTIVE  Precautions: Bed/chair alarm  Fall Risk: Standard fall risk    WEIGHT BEARING RESTRICTION  Weight Bearing Restriction: None                PAIN ASSESSMENT  Ratin  Location: right jaw pain, right UE pain, L hip pain  Management Techniques: Activity promotion;Body mechanics;Repositioning    COGNITION  Overall Cognitive Status:  WFL - within functional limits    RANGE OF MOTION AND STRENGTH ASSESSMENT  Upper extremity ROM and strength are within functional limits     Lower extremity ROM is within functional limits     Lower extremity strength is within functional limits       BALANCE  Static Sitting: Fair +  Dynamic Sitting: Fair  Static Standing: Fair -  Dynamic Standing: Poor +    ADDITIONAL TESTS                                    ACTIVITY TOLERANCE                         O2 WALK       NEUROLOGICAL FINDINGS                        AM-PAC '6-Clicks' INPATIENT SHORT FORM - BASIC MOBILITY  How much difficulty does the patient currently have...  Patient  Difficulty: Turning over in bed (including adjusting bedclothes, sheets and blankets)?: A Little   Patient Difficulty: Sitting down on and standing up from a chair with arms (e.g., wheelchair, bedside commode, etc.): A Little   Patient Difficulty: Moving from lying on back to sitting on the side of the bed?: A Little   How much help from another person does the patient currently need...   Help from Another: Moving to and from a bed to a chair (including a wheelchair)?: A Little   Help from Another: Need to walk in hospital room?: A Little   Help from Another: Climbing 3-5 steps with a railing?: A Little       AM-PAC Score:  Raw Score: 18   Approx Degree of Impairment: 46.58%   Standardized Score (AM-PAC Scale): 43.63   CMS Modifier (G-Code): CK    FUNCTIONAL ABILITY STATUS  Gait Assessment   Functional Mobility/Gait Assessment  Gait Assistance: Supervision  Distance (ft): 100  Assistive Device: Rolling walker  Pattern: Shuffle    Skilled Therapy Provided     Bed Mobility:  Rolling: NT  Supine to sit: w/ HOB elevated [place] to reach sitting EOB- assist required at trunk as incr pain when encouraged to push via BUE to assist   Sit to supine: NT     Transfer Mobility:  Sit to stand: supervision to RW- v/c for hand placement   Stand to sit: supervision  Gait = supervision w/ RW x 100 feet, shuffle gait pattern, decr valerio. Barely lifts LLE off the floor.     Therapist's Comments:  Patient presents laying in bed. Discussed role and goal of physical therapy in hospital setting. Pt in agreement to session. Reports wide spread pain and hypersensitivity (Burning) throughout body.   Bed mobility at Ro[place] with HOB elevated and incr time. Sit to stand transfer to RW at supervision. Ambulated 100 feet w RW at supervision; see above for specifics. ECHO tech arrived to complete test- pt returned supine in bed at supervision.   Educated on importance of out of bed mobility (up for meals) and encouraged continued ambulation with  nursing staff. RN aware.     Exercise/Education Provided:  Bed mobility  Body mechanics  Energy conservation  Functional activity tolerated  Gait training  Posture  Transfer training    Patient End of Session: In bed;Needs met;Call light within reach;With  staff;RN aware of session/findings;All patient questions and concerns addressed;Discussed recommendations with /      Patient Evaluation Complexity Level:  History Moderate - 1 or 2 personal factors and/or co-morbidities   Examination of body systems Low - addressing 1-2 elements   Clinical Presentation Low - Stable   Clinical Decision Making Low - Stable       PT Session Time: 25 minutes  Gait Trainin minutes

## 2024-01-18 NOTE — DISCHARGE INSTRUCTIONS
Sometimes managing your health at home requires assistance.  The [facility] team has recognized your preference to use [facility].  They can be reached by phone at [phone].  The fax number for your reference is [phone].  A representative from the home health agency will contact you or your family to schedule your first visit.     [place] [address]  [phone]  [email or web address]    Nutrition Assistance programs:  [facility] [employer] bank: Email [email or web address] to get more info or call [phone]    [place] Supplemental Assistance Program (SNAP): Apply on their website, https://anthony.[place].gov/anthony/access/  OR call 583-137-0274   Transportation resources:  -Ride Assist [place] - [phone] www.rideassisHireArtMercy Health [facility]  -Ride [place] [phone] or [phone] [email or web address]  -First Transit [phone] [facility].[place].[facility]/hospitals/SiteCollectionDocuments/First_Transit_Trip_Request_[email or web address].pdf    -Village librado [place] Ride Around [facility] [phone]  -[facility] Transit System [phone]  -[facility] Transportation [phone] Ext. [phone]   -[facility] Pace Dial-a-Ride Service  Reservations: [phone]  -[facility] Senior Shuttle Bus Line at [phone]  -[facility] Senior Bus Service [phone]  -[facility] [phone].  -[facility] Transit 6-523-EAM-4KAT  -[facility] [phone]   -[employer] Transportation [phone]  -Go GO Grandparent Transportation [phone] [email or web address]  -[employer] Transport [phone]  -Disabled on the Go [phone]  -[place] Safety Transfer [phone]    An order has been placed for MRI LUMBAR W+WO . Imaging should be completed in 1 month. Please call [phone] to schedule imaging prior to your follow up appointment with Neurosurgery.    -THU  [facility] Wheelchair Transport [phone]   -[place] Transport: [phone]  -Connections thro mobile [place] [phone]  -[employer] [phone]  -A-Briana [facility] [place], [facility] [place], and [facility]. [phone] www.[employer]  -[place] Wheels Transportation [facility] and surrounding communities [phone] www.HCI  -Ride Assist [facility] [phone] www.rideassistnaProMedica [facility].org  -Ride [facility] [phone] or [phone] [email or web address]  -First Transit [phone] [facility].[place].[facility]/hfs/SiteCollectionDocuments/First_Transit_Trip_Request_[email or web address].pdf

## 2024-01-18 NOTE — CM/SW NOTE
SW order acknowledged. SW sent HH referral via Aidin, need orders. Awaiting list of accepting HH, SW will follow up to discuss dc planning.    Addendum 905:     01/18/24 0900   Discharge Needs   Anticipated D/C needs Home health care   Choice of Post-Acute Provider   Patient/family choice [facility]     SW notified by Ruby from [facility] pt is pending with them for HH RN/PT. Reserved [facility] in Aidin, new orders entered. AVS has [facility] contact information.    Addendum 1100:  SW met with pt to discuss dc planning, she was alert, and oriented at time of discussion. Pt reports she lives alone in [place], reports her son is supportive. Pt agreeable to [facility] for dc. Discussed private duty CG, transportation resources, and [facility] Services. SW informed will place resources in AVS, and provide in person later    SW to remain available for dc planning, and/or additional need for support.    Jerald Chavez, BRIAN  Discharge Planner  [id number]

## 2024-01-19 ENCOUNTER — APPOINTMENT (OUTPATIENT)
Dept: MRI IMAGING | Facility: HOSPITAL | Age: 79
End: 2024-01-19
Attending: HOSPITALIST
Payer: MEDICARE

## 2024-01-19 VITALS
OXYGEN SATURATION: 100 % | WEIGHT: 100 LBS | DIASTOLIC BLOOD PRESSURE: 47 MMHG | HEIGHT: 57 IN | TEMPERATURE: 98 F | BODY MASS INDEX: 21.57 KG/M2 | HEART RATE: 67 BPM | SYSTOLIC BLOOD PRESSURE: 102 MMHG | RESPIRATION RATE: 16 BRPM

## 2024-01-19 PROBLEM — R07.89 MUSCULOSKELETAL CHEST PAIN: Status: ACTIVE | Noted: 2024-01-18

## 2024-01-19 PROBLEM — M79.604 RIGHT LEG PAIN: Status: ACTIVE | Noted: 2024-01-19

## 2024-01-19 LAB
ANION GAP SERPL CALC-SCNC: 5 MMOL/L (ref 0–18)
BASOPHILS # BLD AUTO: 0.06 X10(3) UL (ref 0–0.2)
BASOPHILS NFR BLD AUTO: 1.3 %
BUN BLD-MCNC: 21 MG/DL (ref 9–23)
CALCIUM BLD-MCNC: 9.2 MG/DL (ref 8.5–10.1)
CHLORIDE SERPL-SCNC: 110 MMOL/L (ref 98–112)
CHOLEST SERPL-MCNC: 143 MG/DL (ref ?–200)
CO2 SERPL-SCNC: 26 MMOL/L (ref 21–32)
CREAT BLD-MCNC: 0.58 MG/DL
EGFRCR SERPLBLD CKD-EPI 2021: 91 ML/MIN/1.73M2 (ref 60–?)
EOSINOPHIL # BLD AUTO: 0.07 X10(3) UL (ref 0–0.7)
EOSINOPHIL NFR BLD AUTO: 1.5 %
ERYTHROCYTE [DISTWIDTH] IN BLOOD BY AUTOMATED COUNT: 13.5 %
GLUCOSE BLD-MCNC: 95 MG/DL (ref 70–99)
HCT VFR BLD AUTO: 35.8 %
HDLC SERPL-MCNC: 49 MG/DL (ref 40–59)
HGB BLD-MCNC: 12.1 G/DL
IMM GRANULOCYTES # BLD AUTO: 0.02 X10(3) UL (ref 0–1)
IMM GRANULOCYTES NFR BLD: 0.4 %
LDLC SERPL CALC-MCNC: 74 MG/DL (ref ?–100)
LYMPHOCYTES # BLD AUTO: 2.02 X10(3) UL (ref 1–4)
LYMPHOCYTES NFR BLD AUTO: 42.3 %
MCH RBC QN AUTO: 30.7 PG (ref 26–34)
MCHC RBC AUTO-ENTMCNC: 33.8 G/DL (ref 31–37)
MCV RBC AUTO: 90.9 FL
MONOCYTES # BLD AUTO: 0.26 X10(3) UL (ref 0.1–1)
MONOCYTES NFR BLD AUTO: 5.5 %
NEUTROPHILS # BLD AUTO: 2.34 X10 (3) UL (ref 1.5–7.7)
NEUTROPHILS # BLD AUTO: 2.34 X10(3) UL (ref 1.5–7.7)
NEUTROPHILS NFR BLD AUTO: 49 %
NONHDLC SERPL-MCNC: 94 MG/DL (ref ?–130)
OSMOLALITY SERPL CALC.SUM OF ELEC: 295 MOSM/KG (ref 275–295)
PLATELET # BLD AUTO: 206 10(3)UL (ref 150–450)
POTASSIUM SERPL-SCNC: 4.2 MMOL/L (ref 3.5–5.1)
RBC # BLD AUTO: 3.94 X10(6)UL
SODIUM SERPL-SCNC: 141 MMOL/L (ref 136–145)
TRIGL SERPL-MCNC: 108 MG/DL (ref 30–149)
VLDLC SERPL CALC-MCNC: 17 MG/DL (ref 0–30)
WBC # BLD AUTO: 4.8 X10(3) UL (ref 4–11)

## 2024-01-19 PROCEDURE — 99239 HOSP IP/OBS DSCHRG MGMT >30: CPT | Performed by: HOSPITALIST

## 2024-01-19 PROCEDURE — 72149 MRI LUMBAR SPINE W/DYE: CPT | Performed by: HOSPITALIST

## 2024-01-19 RX ORDER — DIPHENHYDRAMINE HYDROCHLORIDE 50 MG/ML
10 INJECTION, SOLUTION INTRAMUSCULAR; INTRAVENOUS
Status: COMPLETED | OUTPATIENT
Start: 2024-01-19 | End: 2024-01-19

## 2024-01-19 NOTE — DISCHARGE SUMMARY
[facility]     Arianna Colin Patient Status:  Observation    1945 MRN [id number]   Location [facility] 0SW-A Attending Clive Agustin MD   Hosp Day # 0 PCP Micheal Clifford MD     Date of Admission: 2024  Date of Discharge: 2024  Discharge Disposition: Home Health Care Services  Chief complaint:   Chief Complaint   Patient presents with    Pain     Discharge Diagnosis and synopsis:  non-cardiac chest pain-likely musculoskeletal; evaluated by cards during stay and had negative trops, nonischemic EKG and unremarkable echo.  Right thigh/leg pain and generalized pain; partly from arthritis, fibromyalgia and possibly complex cystic lesion from T12 through L3; repeat MRi with contrast showed   \"Limited post-contrast MRI of the lumbar spine performed for assessment of a previously visualized fluid collection in left posterior epidural space extending from lower T12 level to the upper L2 level.  The previously visualized collection   demonstrates trace peripheral enhancement, no central enhancement, no significant gradient susceptibility, and mild local mass effect.  The collection has an overall measurement of 2.2 x 0.9 x 4.4 cm.  Differential considerations would include an   arachnoid cyst, atypical perineural cyst, changes related to CSF leak, resolving old epidural hematoma, with other etiologies not entirely excluded.  Clinical correlation recommended.  MRI of the lumbar spine with without contrast in 3-6 months is   suggested to assess for resolution/stability\"  Plan is for follow up in clinic with neurosurgery for ongoing evaluation with repeat MRI with and without contrast in 3-6 months.  Fibromyalgia; patient prefers to talk with PCP about gabapentin/lyrica etc  Hypothyroidism  Mild intermittent asthma, COPD          Admission History of Present Illness (author of HPI not necessarily myself; see H&P author): Arianna Colin is a 78 year old female admitted with  Multiple complaints including chest pain, hip pain, knee pain, ankle pain, diffuse burning sensation in the body including chest, shortness of breath.  Denies any fever or chills.  Denies any recent long trips.  Patient has history of diabetes hypertension hyperlipidemia and fibromyalgia.  Patient states she has never seen a rheumatologist as outpatient.  Chest pain on and off 9 out of 10 pain when it comes according to patient.  Also complains of on and off headache 9 out of 10.  No fever.  Denies any focal weakness or numbness.      Lace+ Score: 67  59-90 High Risk  29-58 Medium Risk  0-28   Low Risk       TCM Follow-Up Recommendation:  LACE > 58: High Risk of readmission after discharge from the hospital.      Discharge Medication List:     Discharge Medications        CONTINUE taking these medications        Instructions Prescription details   albuterol 108 (90 Base) MCG/ACT Aers  Commonly known as: Ventolin HFA      Inhale 2 puffs into the lungs every 6 (six) hours as needed for Wheezing.   Refills: 0     GABRIELLA-MAG OR      Take 600 mg by mouth in the morning and 600 mg before bedtime.   Refills: 0     COLLAGEN OR      Take by mouth.   Refills: 0     DIGESTIVE ENZYMES OR      Take by mouth 2 (two) times a day.   Refills: 0     fluticasone furoate-vilanterol 100-25 MCG/ACT Aepb  Commonly known as: Breo Ellipta      Inhale 1 puff into the lungs daily.   Quantity: 1 each  Refills: 5     Nebulizer/Tubing/Mouthpiece Kit      Use as directed.   Quantity: 1 each  Refills: 0     NON FORMULARY      quersetin   Refills: 0     Omega 3 500 500 MG Caps      Take 1 capsule by mouth daily. 625 mg   Refills: 0     QC TUMERIC COMPLEX OR      Take by mouth.   Refills: 0     Synthroid 25 MCG Tabs  Generic drug: levothyroxine      Take 1 tablet (25 mcg total) by mouth before breakfast.   Quantity: 90 tablet  Refills: 0     vitamin B-12 50 MCG Tabs  Commonly known as: CYANOCOBALAMIN      Take 1 tablet (50 mcg total) by mouth daily.    Refills: 0     vitamin C 100 MG Tabs      Take 1 tablet (100 mg total) by mouth daily.   Refills: 0     Vitamin D 50 MCG (2000 UT) Caps      Take by mouth.   Refills: 0              [place]    Follow-up appointment:   Micheal Clifford MD  [address]  [facility] [[email or web address]] [address]  [facility] [address] [phone]    Follow up in 1 week(s)  discuss repeat spine MRI;  MRI recommended to be repeated in 3-6 months    DOREEN Malone, DO  [address] [facility] [address]  [place] [address]  [phone]    Follow up on 2/21/2024  Hospital follow up fir extradural lesion/cyst, at 1:20 PM   (Patient needs a referral to Neurosurgery from PCP)    Appointments for Next 30 Days 1/19/2024 - 2/18/2024        Date Arrival Time Visit Type Length Department Provider     1/24/2024  9:00 AM  EXAM - ESTABLISHED [2844] 40 min [facility], [address] [place], [place] [address]Francis DO    Patient Instructions:         Location Instructions:     Masks are optional for all patients and visitors, unless otherwise indicated.                      Vital signs:  Temp:  [97.4 °F (36.3 °C)-97.9 °F (36.6 °C)] 97.9 °F (36.6 °C)  Pulse:  [67-77] 67  Resp:  [16-18] 16  BP: ()/(39-59) 102/47  SpO2:  [94 %-100 %] 100 %    Physical Exam:    General: No acute distress.   Respiratory: Clear to auscultation bilaterally. No wheezes. No rhonchi.  Cardiovascular: S1, S2. Regular rate and rhythm. No murmurs.  Abdomen: Soft, nontender, nondistended.    Extremities: No edema.  -----------------------------------------------------------------------------------------------  PATIENT DISCHARGE INSTRUCTIONS: See electronic chart    Clive Dutton MD    Time spent:  > 30 minutes
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
## 2024-01-19 NOTE — PLAN OF CARE
Assumed care at 1930  Recd pt on bed.  Right hip and bilateral knees  tolerable pain when not moving.Refused pain med.  Chronic tingling on both legs.  Informed MD of MRi lumbar spine result, Recd order to consult ortho spine - consulted..Victor ee patient tomorrow.  Patient c/o hard stool, had bowel movement today, got order for stool softener, given.  Pt wants to sleep tonight, melatonin given.  Vital signs stable.  Ortho spine consult to see.  Problem: Patient/Family Goals  Goal: Patient/Family Long Term Goal  Description: Patient's Long Term Goal:go home with adequate resources    Interventions:  - PT/OT  -SW  -discharge instructions  F/up care  - See additional Care Plan goals for specific interventions  Outcome: Progressing  Goal: Patient/Family Short Term Goal  Description: Patient's Short Term Goal: pain conttrolled    Interventions:   - pain med  -needs attended  - See additional Care Plan goals for specific interventions  Outcome: Progressing     Problem: PAIN - ADULT  Goal: Verbalizes/displays adequate comfort level or patient's stated pain goal  Description: INTERVENTIONS:  - Encourage pt to monitor pain and request assistance  - Assess pain using appropriate pain scale  - Administer analgesics based on type and severity of pain and evaluate response  - Implement non-pharmacological measures as appropriate and evaluate response  - Consider cultural and social influences on pain and pain management  - Manage/alleviate anxiety  - Utilize distraction and/or relaxation techniques  - Monitor for opioid side effects  - Notify MD/LIP if interventions unsuccessful or patient reports new pain  - Anticipate increased pain with activity and pre-medicate as appropriate  Outcome: Progressing     Problem: MUSCULOSKELETAL - ADULT  Goal: Return mobility to safest level of function  Description: INTERVENTIONS:  - Assess patient stability and activity tolerance for standing, transferring and ambulating w/ or w/o assistive  devices  - Assist with transfers and ambulation using safe patient handling equipment as needed  - Ensure adequate protection for wounds/incisions during mobilization  - Obtain PT/OT consults as needed  - Advance activity as appropriate  - Communicate ordered activity level and limitations with patient/family  Outcome: Progressing  Goal: Maintain proper alignment of affected body part  Description: INTERVENTIONS:  - Support and protect limb and body alignment per provider's orders  - Instruct and reinforce with patient and family use of appropriate assistive device and precautions (e.g. spinal or hip dislocation precautions)  Outcome: Progressing

## 2024-01-19 NOTE — PROGRESS NOTES
[facility]     Hospitalist Progress Note     Arianna Colin Patient Status:  Observation    1945 MRN [id number]   Location [facility] 0SW-A Attending Clive Agustin MD   Hosp Day # 0 PCP Micheal Clifford MD     Chief Complaint:   Chief Complaint   Patient presents with    Pain       Subjective:     Patient denies any pain at rest currently.  Has not been up yet today    Objective:    Review of Systems:   6  point ROS completed and was negative, except for pertinent positive and negatives stated in subjective.    Vital signs:  Temp:  [97.4 °F (36.3 °C)-98.3 °F (36.8 °C)] 97.8 °F (36.6 °C)  Pulse:  [65-75] 69  Resp:  [16-19] 16  BP: ()/(39-59) 115/59  SpO2:  [94 %-100 %] 98 %    Physical Exam:    General: No acute distress.   Respiratory: Clear to auscultation bilaterally. No wheezes. No rhonchi.  Cardiovascular: S1, S2. Regular rate and rhythm. No murmurs.  Abdomen: Soft, nontender, nondistended.    Extremities: No edema.    Diagnostic Data:    Labs:  Recent Labs   Lab 24  021   WBC 5.8   HGB 12.3   MCV 92.5   .0       Recent Labs   Lab 24  0215   *   BUN 22   CREATSERUM 0.87   CA 8.7   ALB 3.7      K 4.0      CO2 28.0   ALKPHO 71   AST 16   ALT 18   BILT 0.4   TP 6.8       Estimated Creatinine Clearance: 37 mL/min (based on SCr of 0.87 mg/dL).    No results for input(s): \"PTP\", \"INR\" in the last 168 hours.         COVID-19 Lab Results    COVID-19  Lab Results   Component Value Date    COVID19 Not Detected 2024    COVID19 Not Detected 2023    COVID19 Not Detected 2022       Pro-Calcitonin  No results for input(s): \"PCT\" in the last 168 hours.    Cardiac  No results for input(s): \"TROP\", \"PBNP\" in the last 168 hours.    Creatinine Kinase  No results for input(s): \"CK\" in the last 168 hours.    Inflammatory Markers  Recent Labs   Lab 245   CRP <0.29   DDIMER <0.27       Recent Labs   Lab 24  0215 24  0917 24  1141    JANICE 4 3 4       Imaging: Imaging data reviewed in Epic.    Medications:    ondansetron  4 mg Intravenous Once    ketorolac  15 mg Intravenous Once    aspirin  325 mg Oral Daily    enoxaparin  40 mg Subcutaneous Daily    fluticasone furoate-vilanterol  1 puff Inhalation Daily    levothyroxine  25 mcg Oral Before breakfast       Assessment & Plan:      non-cardiac chest pain-likely musculoskeletal  Right thigh/leg pain and generalized pain; complex cystic lesion from T12 through L3; repeat MRi with contrast; spine consult requested per rheumatology  Fibromyalgia; patient prefers to talk with PCP about gabapentin/lyrica etc  Hypothyroidism  Mild intermittent asthma, COPD-stable      Plan of care discussed with patient and RN    Clive Dutton MD    Supplementary Documentation:     Quality:  DVT Prophylaxis:  lovenox  CODE status: full  Farooq: no  Central line: no      Estimated date of discharge: 0-1 days?  At this point Ms. Colin is expected to be discharge to: home

## 2024-01-19 NOTE — PLAN OF CARE
Assumed pt care at 0730. A&Ox4. VSS. Room air, . NSR on tele. Mild SOB this AM, pt reports this is chronic for her and she typically takes PRN albuterol for it at home. Improved with PRN albuterol, lungs clear/diminished. Denies pain this AM, reports right hip pain has greatly improved. Denies N/V. Cardiac diet. L AC PIV SL. Voiding, up x1 assist. Lovenox subcutaneous for VTE prevention. MRI L-spine with contrast to be completed. Pt updated with POC.     Problem: Patient/Family Goals  Goal: Patient/Family Long Term Goal  Description: Patient's Long Term Goal:go home with adequate resources  Interventions:  - PT/OT  -SW  -discharge instructions  F/up care  - See additional Care Plan goals for specific interventions  Outcome: Progressing  Goal: Patient/Family Short Term Goal  Description: Patient's Short Term Goal: pain conttrolled  Interventions:   - pain med  -needs attended  - See additional Care Plan goals for specific interventions  Outcome: Progressing     Problem: PAIN - ADULT  Goal: Verbalizes/displays adequate comfort level or patient's stated pain goal  Description: INTERVENTIONS:  - Encourage pt to monitor pain and request assistance  - Assess pain using appropriate pain scale  - Administer analgesics based on type and severity of pain and evaluate response  - Implement non-pharmacological measures as appropriate and evaluate response  - Consider cultural and social influences on pain and pain management  - Manage/alleviate anxiety  - Utilize distraction and/or relaxation techniques  - Monitor for opioid side effects  - Notify MD/LIP if interventions unsuccessful or patient reports new pain  - Anticipate increased pain with activity and pre-medicate as appropriate  Outcome: Progressing     Problem: MUSCULOSKELETAL - ADULT  Goal: Return mobility to safest level of function  Description: INTERVENTIONS:  - Assess patient stability and activity tolerance for standing, transferring and ambulating w/ or w/o  assistive devices  - Assist with transfers and ambulation using safe patient handling equipment as needed  - Ensure adequate protection for wounds/incisions during mobilization  - Obtain PT/OT consults as needed  - Advance activity as appropriate  - Communicate ordered activity level and limitations with patient/family  Outcome: Progressing  Goal: Maintain proper alignment of affected body part  Description: INTERVENTIONS:  - Support and protect limb and body alignment per provider's orders  - Instruct and reinforce with patient and family use of appropriate assistive device and precautions (e.g. spinal or hip dislocation precautions)  Outcome: Progressing

## 2024-01-19 NOTE — PROGRESS NOTES
Progress Note  Arianna Colin Patient Status:  Observation    1945 MRN [id number]   Location [facility] 0SW-A Attending Clive Agustin MD   Hosp Day # 0 PCP Micheal Clifford MD     Subjective:  Patient denies chest pain or shortness of breath today. States when she has had chest pain, it has been intermittent, sharp, localized and goes away within a few seconds. This has occurred over the past couple of years with no recent worsening. Pain occurs at rest, is not associated with exertion and is not associated with nausea, vomiting, diaphoresis or lightheadedness.     Objective:  /59 (BP Location: Left arm)   Pulse 69   Temp 97.8 °F (36.6 °C) (Oral)   Resp 16   Ht 4' 9\" (1.448 m)   Wt 100 lb (45.4 kg)   SpO2 98%   BMI 21.64 kg/m²     Telemetry: NSR    Intake/Output:    Intake/Output Summary (Last 24 hours) at 2024 0616  Last data filed at 2024 1230  Gross per 24 hour   Intake 240 ml   Output 302 ml   Net -62 ml       Last 3 Weights   24 0000 100 lb (45.4 kg)   23 0952 103 lb 9.6 oz (47 kg)   23 1238 101 lb (45.8 kg)       Labs:  Recent Labs   Lab 24  0215   *   BUN 22   CREATSERUM 0.87   EGFRCR 67   CA 8.7      K 4.0      CO2 28.0     Recent Labs   Lab 24  0215   RBC 3.99   HGB 12.3   HCT 36.9   MCV 92.5   MCH 30.8   MCHC 33.3   RDW 13.4   NEPRELIM 2.74   WBC 5.8   .0         Recent Labs   Lab 24  0215 24  0917 24  1141   TROPHS 4 3 4       Diagnostics:  MRI SPINE LUMBAR (CPT=72148)    Result Date: 2024  CONCLUSION:  1. There is a posterior epidural complex cystic lesion extending from proximately T12-L1 to L2-3 measuring 4.3 x 0.9 x 2.3 cm.  Secondary mild central canal stenosis at this level.  This is a nonspecific lesion and has a nonaggressive appearance.  Further assessment with contrast enhanced MRI is recommended. 2. Mild multilevel degenerative disc changes and facet changes, with evidence of mild  bilateral subarticular and neural foraminal stenosis at L5-S1.   LOCATION:  [facility]   Dictated by (CST): Ashlie Braden DO on 1/18/2024 at 7:26 PM     Finalized by (CST): Ashlie Braden DO on 1/18/2024 at 7:36 PM       XR PELVIS (COMPLETE MIN 3 VIEWS) (CPT=72190)    Result Date: 1/18/2024  CONCLUSION:  Mild osteoarthritic changes in both hips.  No fracture or dislocation.  Mild SI joint DJD also noted.   LOCATION:  [facility]   Dictated by (CST): Fransisco Lala MD on 1/18/2024 at 4:13 PM     Finalized by (CST): Fransisco Lala MD on 1/18/2024 at 4:15 PM       XR THORACIC SPINE (3 VIEWS) (CPT=72072)    Result Date: 1/18/2024  CONCLUSION:  Slight worsening in degenerative disc disease in the thoracic spine.  No acute fracture or dislocation.  Thoracic kyphosis is slightly exaggerated and slightly more prominent than on the previous exam also.   LOCATION:  [facility]    Dictated by (CST): Fransisco Lala MD on 1/18/2024 at 4:11 PM     Finalized by (CST): Fransisco Lala MD on 1/18/2024 at 4:13 PM       XR LUMBAR SPINE (MIN 2 VIEWS) (CPT=72100)    Result Date: 1/18/2024  CONCLUSION:  Degenerate arthritic changes are seen.  There is no acute fracture, subluxation or dislocation.   LOCATION:  [facility]   Dictated by (CST): Fransisco Lala MD on 1/18/2024 at 4:10 PM     Finalized by (CST): Fransisco Lala MD on 1/18/2024 at 4:11 PM       XR CERVICAL SPINE (2-3 VIEWS) (CPT=72040)    Result Date: 1/18/2024  CONCLUSION:  Degenerative disc disease and mild facet arthropathy.  No significant change from previous.   LOCATION:  [facility]   Dictated by (CST): Fransisco Lala MD on 1/18/2024 at 4:07 PM     Finalized by (CST): Fransisco Lala MD on 1/18/2024 at 4:10 PM       Review of Systems   Constitutional: Positive for malaise/fatigue.   Cardiovascular: Negative.    Respiratory: Negative.     Musculoskeletal:  Positive for joint pain.       Physical Exam:    Gen: alert, oriented x 3, NAD  Heent: pupils equal, reactive.  Mucous membranes moist.   Neck: no jvd  Cardiac: regular rate and rhythm, normal S1,S2, no murmur, clicks, rub or gallop  Lungs: CTA  Abd: soft, NT/ND +bs  Ext: no edema  Skin: Warm, dry  Neuro: No focal deficits    Medications:     ondansetron  4 mg Intravenous Once    ketorolac  15 mg Intravenous Once    aspirin  325 mg Oral Daily    enoxaparin  40 mg Subcutaneous Daily    fluticasone furoate-vilanterol  1 puff Inhalation Daily    levothyroxine  25 mcg Oral Before breakfast       Assessment:  Atypical Chest Pain  Troponin negative x3  ECG: NSR without acute ischemic changes  TTE with LVEF 60-65%, no RWMA, no significant valvular abnormalities  CT Abd/Pelvis with stool retention  CXR unremarkable  D.Dimer negative   Pain not c/w ACS   Generalized Pain, Polyarthralgia   CRP <0.29  MRI Spine with cystic lesion T12 to L2-3; MRI Spine with contrast pending  NSAIDs, pain management  Per primary, rheumatology  Ortho/spine consulted  Hx Fibromyalgia  Hx Asthma/COPD - inhaler, stable  Hypothyroidism - on levothyroxine    Plan:  ECG, Echocardiogram without acute abnormalities and patient denies any further chest pain.   Cardiology will sign off at this time. Feel free to call with any questions or concerns.     Plan of care discussed with patient, ANGEL.    Shayla Mays, APRN  1/19/2024  6:16 AM  [phone] [facility]  [phone] [facility]

## 2024-01-20 NOTE — PLAN OF CARE
NURSING DISCHARGE NOTE    Discharged Home via Wheelchair.  Accompanied by Family member and Support staff  Belongings Taken by patient/family.    Cleared for discharge by hospitalist and all consults. Pt discharged in calm, stable status to home. Discharge paperwork provided & discussed, pt verbalized understanding. Discussed discharge planning with pt's daughter over the phone as well. PIV removed from pt. No paper prescriptions to provide.

## 2024-01-22 ENCOUNTER — TELEPHONE (OUTPATIENT)
Facility: CLINIC | Age: 79
End: 2024-01-22

## 2024-01-22 ENCOUNTER — PATIENT OUTREACH (OUTPATIENT)
Dept: CASE MANAGEMENT | Age: 79
End: 2024-01-22

## 2024-01-22 DIAGNOSIS — Z02.9 ENCOUNTERS FOR UNSPECIFIED ADMINISTRATIVE PURPOSE: Primary | ICD-10-CM

## 2024-01-22 DIAGNOSIS — J45.901 MODERATE ASTHMA WITH ACUTE EXACERBATION, UNSPECIFIED WHETHER PERSISTENT: Primary | ICD-10-CM

## 2024-01-22 PROCEDURE — 1111F DSCHRG MED/CURRENT MED MERGE: CPT

## 2024-01-22 PROCEDURE — 1159F MED LIST DOCD IN RCRD: CPT

## 2024-01-22 RX ORDER — ALBUTEROL SULFATE 90 UG/1
2 AEROSOL, METERED RESPIRATORY (INHALATION) EVERY 4 HOURS PRN
Qty: 1 EACH | Refills: 3 | Status: SHIPPED | OUTPATIENT
Start: 2024-01-22

## 2024-01-22 NOTE — PROGRESS NOTES
Initial Post Discharge Follow Up    Discharge Date: 1/19/24  Contact Date: 1/22/2024    Consent Verification:  Assessment Completed With: Patient  HIPAA Verified?  Yes    Discharge Dx:   Non-cardiac chest pain-likely musculoskeletal; evaluated by cards during stay and had negative trops, nonischemic EKG and unremarkable echo.  Right thigh/leg pain and generalized pain; partly from arthritis, fibromyalgia and possibly complex cystic lesion from T12 through L3; repeat MRi with contrast showed     General:   How have you been since your discharge from the hospital? Pt stated she is doing okay, pt feels the pain is about same. Pt reports the pain mainly the right hip, right knee/leg pain and back. Pt feels she is ambulating a little better with the walker. Pt does live alone but she has her son that is near her. Pt reports her son helps her as needed but at times he is far away for work. Pt does have neighbors she can ask for help if needed. Pt is looking into transportation options through her insurance. Pt stated she cares for her, makes her own meals and has her food delivered. Pt confirmed [facility] did come to see the pt on Saturday. She plans to have an RN three times a week and to start PT. Pt denies currently any symptoms except pain. Pt does feel SOB with too much walking due to her asthma but she feels better is she rests. Pt did state she is out of her albuterol inhaler she likes to keep on hand, [facility] to call Pulmonology for a refill, see below. Pr is eating and drinking well. Pt plans to discuss seeing Rheumatology and starting new medications with PCP at her appt on 1/24/24. Pt denies currently SOB, CP, worsening weakness, dizziness, confusion, fevers, n/v/d, issues urinating, numbness/tingling to LE and worsening pain. Pt plans to call Senior Services through [facility] and to talk to [facility] about an aid and other needs within her home.   Do you have any pain since discharge?  Yes  Where: back, right  side hip/knee   Rating on pain scale 1-10, 10 being the worst pain you have ever experienced, what is current pain: 7- worse when walking at times.  Alleviating factors: Ice packs, Ibuprofen as needed. Pt prefers to not take anything stronger for pain at this time.  Aggravating factors: ambulating for longer distances.  Is the pain manageable at home? Yes  How well was your pain managed while in the hospital?   On a scale of 1-5   1- Very Poor and 5- Very well   Very Well  When you were leaving the hospital were your discharge instructions reviewed with you? Yes  How well were your discharge instructions explained to you?   On a scale of 1-5   1- Very Poor and 5- Very well   Very Well  Do you have any questions about your discharge instructions?  No  Before leaving the hospital was your diagnoses explained to you? Yes  Do you have any questions about your diagnoses? No  Are you able to perform normal daily activities of living as you have prior to your hospital stay (dressing, bathing, ambulating to the bathroom, etc)? no  If No, What are some barriers or concerns?  Taking her time with ADL's. Pt uses a walker when up and ambulating.   (NCM) Was patient given a different diet per AVS? no    Medications: Reviewed medication list with the patient. Medications are up to date.   Current Outpatient Medications   Medication Sig Dispense Refill    albuterol 108 (90 Base) MCG/ACT Inhalation Aero Soln Inhale 2 puffs into the lungs every 6 (six) hours as needed for Wheezing.      fluticasone furoate-vilanterol (BREO ELLIPTA) 100-25 MCG/ACT Inhalation Aerosol Powder, Breath Activated Inhale 1 puff into the lungs daily. 1 each 5    SYNTHROID 25 MCG Oral Tab Take 1 tablet (25 mcg total) by mouth before breakfast. 90 tablet 0    Cholecalciferol (VITAMIN D) 50 MCG (2000 UT) Oral Cap Take by mouth.      Turmeric (QC TUMERIC COMPLEX OR) Take by mouth.      vitamin B-12 50 MCG Oral Tab Take 1 tablet (50 mcg total) by mouth daily.       NON FORMULARY [facility]      Respiratory Therapy Supplies (NEBULIZER/TUBING/MOUTHPIECE) Does not apply Kit Use as directed. 1 each 0    COLLAGEN OR Take by mouth.      Omega-3 Fatty Acids (OMEGA 3 500) 500 MG Oral Cap Take 1 capsule by mouth daily. 625 mg      Ascorbic Acid (VITAMIN C) 100 MG Oral Tab Take 1 tablet (100 mg total) by mouth daily.      DIGESTIVE ENZYMES OR Take by mouth 2 (two) times a day.      Calcium-Magnesium (GABRIELLA-MAG OR) Take 600 mg by mouth in the morning and 600 mg before bedtime.       Were there any changes to your current medication(s) noted on the AVS? No  Let's go over your medications together to make sure we are not missing anything. Medications Reviewed  Are there any reasons that keep you from taking your medication as prescribed? No  Are you having any concerns with constipation? No  Did patient receive their flu shot (Sept-March)? Unknown     Discharge medications reviewed/discussed/and reconciled against outpatient medications with patient.  Any changes or updates to medications sent to PCP.  Patient Acknowledged     Referrals/orders at D/C:  Referrals/orders placed at D/C? yes  What services:   Home health   (If HH was ordered) Has HH been set up?  Yes    If Yes: With Whom: [facility]   Except for Home Health Services mentioned above, have you scheduled these other services? Not Applicable  DME ordered at D/C? No    Discharge orders, AVS reviewed and discussed with patient. Any changes or updates to orders sent to PCP.  Patient Acknowledged      SDOH:     Transportation Needs: No Transportation Needs (1/22/2024)    Transportation Needs     Lack of Transportation: No     Financial Resource Strain: Low Risk  (1/22/2024)    Financial Resource Strain     Difficulty of Paying Living Expenses: Not very hard     Med Affordability: No       Follow up appointments:  Reviewed recommended follow up appointments.   Your appointments       Date & Time Appointment Department (Center)     Jan 24, 2024  9:00 AM CST Hospital Follow Up with Francis Taylor, DO [address] ([facility] [place])        Feb 21, 2024  1:20 PM CST Follow Up Visit with DOREEN Malone, DO [facility] ([facility])        Mar 04, 2024 11:20 AM CST MA Supervisit with Micheal Clifford MD [address] ([facility] [place])              [address]  [facility] [place]  [address]  [facility] [address] [phone] [facility]  [address]  [facility] [address] [phone]            TCC  Was TCC ordered: No      PCP (If no TCC appointment)  Does patient already have a PCP appointment scheduled? Yes  NCM Confirmed PCP office TCM appointment with patient     Specialist    Does the patient have any other follow up appointment(s) needing to be scheduled? Yes  If yes: NCM reviewed upcoming specialist appointment with patient: Yes  Does the patient need assistance scheduling appointment(s): No- Pt has an appt scheduled with Dr. Malone, declined a sooner appt.     Is there any reason as to why you cannot make your appointment(s)?  No- Pt is looking into all resources provided on AVS as she would like to set up transportation outside of [employer]. Pt also is calling her insurance. Pt did confirm for now she should be able to get to the appt on 1/24/24 as she is setting up [employer] and plans to have her neighbors assist her if needed. Pt is aware to let the office know asap if she cannot make it. Reviewed all resources listed on AVS.      Needs post D/C:   Now that you are home, are there any needs or concerns you need addressed before your next visit with your PCP?  (DME, meds, questions, etc.): Yes    Interventions by NCM:   All  d/c instructions reviewed with the pt. Reviewed when to call MD vs when to call 911 or go the ED. Reviewed fall precautions. Educated pt on the importance of taking all meds as prescribed as well as close f/u with PCP/specialists. Pt verbalized understanding and will contact the office with any further questions or concerns. Pt is aware to contact NCM to for other resources she may need assistance with.     Contacted Dr. Maher's office to assist pt with refill for Albuterol as she is out currently. Pt requested for a refill to go to [facility] on file in [place]. S/w Alison, she is sending a message to clinical staff to address refill. Pharmacy information was provided.     Contacted [facility] to provide an update that pt does need some assist with resources at home to prepare RN when visiting the pt next. Message was left on confidential VM providing above needs for the pt. Requested a call back and a call to the pt.     Overall Rating:   How would you rate the care you received while in the hospital? excellent    CCM referral placed:    Yes

## 2024-01-24 ENCOUNTER — OFFICE VISIT (OUTPATIENT)
Dept: INTERNAL MEDICINE CLINIC | Facility: CLINIC | Age: 79
End: 2024-01-24
Payer: MEDICARE

## 2024-01-24 VITALS
WEIGHT: 101.63 LBS | TEMPERATURE: 97 F | RESPIRATION RATE: 16 BRPM | SYSTOLIC BLOOD PRESSURE: 106 MMHG | HEART RATE: 74 BPM | DIASTOLIC BLOOD PRESSURE: 54 MMHG | BODY MASS INDEX: 21.93 KG/M2 | OXYGEN SATURATION: 97 % | HEIGHT: 57 IN

## 2024-01-24 DIAGNOSIS — G96.198 CYST OF SPINAL MENINGES: ICD-10-CM

## 2024-01-24 DIAGNOSIS — M81.0 AGE-RELATED OSTEOPOROSIS WITHOUT CURRENT PATHOLOGICAL FRACTURE: Primary | ICD-10-CM

## 2024-01-24 DIAGNOSIS — E03.9 ACQUIRED HYPOTHYROIDISM: ICD-10-CM

## 2024-01-24 DIAGNOSIS — M25.50 POLYARTHRALGIA: ICD-10-CM

## 2024-01-24 DIAGNOSIS — M50.30 DDD (DEGENERATIVE DISC DISEASE), CERVICAL: ICD-10-CM

## 2024-01-24 DIAGNOSIS — M51.37 DEGENERATIVE DISC DISEASE AT L5-S1 LEVEL: ICD-10-CM

## 2024-01-24 DIAGNOSIS — R07.9 CHEST PAIN, UNSPECIFIED TYPE: ICD-10-CM

## 2024-01-24 DIAGNOSIS — M79.7 FIBROMYALGIA: ICD-10-CM

## 2024-01-24 DIAGNOSIS — J45.40 MODERATE PERSISTENT ASTHMA WITHOUT COMPLICATION: ICD-10-CM

## 2024-01-24 DIAGNOSIS — M51.34 THORACIC DEGENERATIVE DISC DISEASE: ICD-10-CM

## 2024-01-24 DIAGNOSIS — M16.0 PRIMARY OSTEOARTHRITIS OF BOTH HIPS: ICD-10-CM

## 2024-01-24 PROCEDURE — 99214 OFFICE O/P EST MOD 30 MIN: CPT | Performed by: INTERNAL MEDICINE

## 2024-01-24 PROCEDURE — 1111F DSCHRG MED/CURRENT MED MERGE: CPT | Performed by: INTERNAL MEDICINE

## 2024-01-24 PROCEDURE — 3078F DIAST BP <80 MM HG: CPT | Performed by: INTERNAL MEDICINE

## 2024-01-24 PROCEDURE — 3008F BODY MASS INDEX DOCD: CPT | Performed by: INTERNAL MEDICINE

## 2024-01-24 PROCEDURE — 1159F MED LIST DOCD IN RCRD: CPT | Performed by: INTERNAL MEDICINE

## 2024-01-24 PROCEDURE — 1170F FXNL STATUS ASSESSED: CPT | Performed by: INTERNAL MEDICINE

## 2024-01-24 PROCEDURE — 1160F RVW MEDS BY RX/DR IN RCRD: CPT | Performed by: INTERNAL MEDICINE

## 2024-01-24 PROCEDURE — 3074F SYST BP LT 130 MM HG: CPT | Performed by: INTERNAL MEDICINE

## 2024-01-24 RX ORDER — GABAPENTIN 100 MG/1
100 CAPSULE ORAL NIGHTLY
Qty: 30 CAPSULE | Refills: 0 | Status: SHIPPED | OUTPATIENT
Start: 2024-01-24

## 2024-01-24 RX ORDER — ALBUTEROL SULFATE 90 UG/1
2 AEROSOL, METERED RESPIRATORY (INHALATION) EVERY 6 HOURS PRN
Qty: 1 EACH | Refills: 0 | Status: SHIPPED | OUTPATIENT
Start: 2024-01-24

## 2024-01-24 NOTE — PROGRESS NOTES
Subjective:   Arianna Colin is a 78 year old female who presents for hospital follow up.   She was discharged from [facility] to Home or Self Care  Admission Date: 1/18/24   Discharge Date: 1/19/24  Hospital Discharge Diagnosis: Non-cardiac chest pain, spinal cyst and diffuse arthralgias     Interactive contact within 2 business days post discharge first initiated on Date: 1/22/2024    During the visit, the following was completed:  Obtained and reviewed discharge summary, continuity of care documents, Hospitalist notes, and Cardiology notes  Reviewed Labs (CBC, CMP), Labs (Cardiac markers), and Labs (Pathology)    HPI:     The patient presented to [facility] ED on 1/18 for evaluation of chest pain and generalized arthralgias.    Cardiology and Rheumatology were consulted.    Chest pain was deemed not cardiac in etiology. EKG, troponin x 3 and echocardiogram were normal/unremarkable.    Labs including CBC, CMP, ESR and CRP unremarkable as well.    MRI lumbar spine showed an epidural cyst extending from T12 to L2. She has appointment scheduled w/ NSGY in Feb.    She also had radiographs of pelvis, lumbar, cervical and thoracic spine which revealed DDD and arthritic changes.    She has HH services now. Had 1 session of PT.     Needs help w/ appointments and would benefit from CCM.    She is interested in a motorized scooter to get around the house, to get the mail and for when outside the house. She does have a son and grandchildren who are local and visit her.    History/Other:   Current Medications:  Medication Reconciliation:  I am aware of an inpatient discharge within the last 30 days.  The discharge medication list has been reconciled with the patient's current medication list and reviewed by me.  See medication list for additions of new medication, and changes to current doses of medications and discontinued medications.  Outpatient Medications Marked as Taking for the 1/24/24 encounter (Office Visit) with  Francis Taylor, DO   Medication Sig    albuterol 108 (90 Base) MCG/ACT Inhalation Aero Soln Inhale 2 puffs into the lungs every 6 (six) hours as needed for Wheezing.    gabapentin 100 MG Oral Cap Take 1 capsule (100 mg total) by mouth nightly.    fluticasone furoate-vilanterol (BREO ELLIPTA) 100-25 MCG/ACT Inhalation Aerosol Powder, Breath Activated Inhale 1 puff into the lungs daily.    SYNTHROID 25 MCG Oral Tab Take 1 tablet (25 mcg total) by mouth before breakfast.    Cholecalciferol (VITAMIN D) 50 MCG (2000 UT) Oral Cap Take by mouth.    Turmeric (QC TUMERIC COMPLEX OR) Take by mouth.    vitamin B-12 50 MCG Oral Tab Take 1 tablet (50 mcg total) by mouth daily.    NON FORMULARY quersetin    Respiratory Therapy Supplies (NEBULIZER/TUBING/MOUTHPIECE) Does not apply Kit Use as directed.    COLLAGEN OR Take by mouth.    Omega-3 Fatty Acids (OMEGA 3 500) 500 MG Oral Cap Take 1 capsule by mouth daily. 625 mg    Ascorbic Acid (VITAMIN C) 100 MG Oral Tab Take 1 tablet (100 mg total) by mouth daily.    DIGESTIVE ENZYMES OR Take by mouth 2 (two) times a day.    Calcium-Magnesium (GABRIELLA-MAG OR) Take 600 mg by mouth in the morning and 600 mg before bedtime.     Review of Systems:  GENERAL: weight stable  SKIN: denies any unusual skin lesions  EYES: denies blurred vision or double vision  HEENT: denies nasal congestion, sinus pain or ST  LUNGS: admits to shortness of breath with exertion  CARDIOVASCULAR: denies chest pain on exertion or palpitations  GI: denies abdominal pain, denies heartburn, denies diarrhea  MUSCULOSKELETAL: diffuse arthralgias   NEURO: denies headaches, denies dizziness, denies weakness    Objective:   No LMP recorded. Patient is postmenopausal.  Estimated body mass index is 21.99 kg/m² as calculated from the following:    Height as of this encounter: 4' 9\" (1.448 m).    Weight as of this encounter: 101 lb 9.6 oz (46.1 kg).   /54 (BP Location: Left arm, Patient Position: Sitting, Cuff Size:  adult)   Pulse 74   Temp 96.9 °F (36.1 °C) (Temporal)   Resp 16   Ht 4' 9\" (1.448 m)   Wt 101 lb 9.6 oz (46.1 kg)   SpO2 97%   BMI 21.99 kg/m²    GENERAL:  in no apparent distress  SKIN: no rashes, no suspicious lesions on visualized skin  LUNGS: clear to auscultation bilaterally  CARDIO: RRR without murmur  MUSCULOSKELETAL: Bilateral knees are tender, joints in hands are tender, right AC joint is tender  EXTREMITIES: no cyanosis, clubbing or edema    Assessment & Plan:   1. Age-related osteoporosis without current pathological fracture (Primary)  -     Endocrine Referral - [facility] (Jill Serrano)  Repeat DEXA scan ordered. Continue Ca/Vit D supplementation. Patient currently follows up w/ Endocrinology for hypothyroidism, last 10/2/2023.  2. Polyarthralgia  -     Rheumatology Referral - [facility] ([place])  Last seen 6/7/2022. Will try Gabapentin, start with low dose nightly for now. Follow up in 1 month for poss dose adjustment.  3. Acquired hypothyroidism  -     Endocrine Referral - [facility] (Jill Serrano)  Continue current dose of Synthroid. Repeat TFTs already ordered.  4. Primary osteoarthritis of both hips Continue PT and PRN pain control for now.  5. Degenerative disc disease at L5-S1 level Has appointment scheduled w/ NSGY. Trial of Gabapentin. Continue PT.  6. Thoracic degenerative disc disease Same at 5.  7. DDD (degenerative disc disease), cervical Same as 5.  8. Cyst of spinal meninges Has appointment w/ NSGY in Feb, repeat MRI ordered as well.  9. Moderate persistent asthma without complication  -     Albuterol Sulfate HFA; Inhale 2 puffs into the lungs every 6 (six) hours as needed for Wheezing.  Dispense: 1 each; Refill: 0  Continue to follow up w/ Pulm.  10. Fibromyalgia  -     Gabapentin; Take 1 capsule (100 mg total) by mouth nightly.  Dispense: 30 capsule; Refill: 0  -     Rheumatology Referral - [facility] ([place])  Trial of Gabapentin. Consider SNRI given co-morbid depression.  11. Chest  pain  Not cardiac in etiology. Pain today was in left shoulder not chest. Likely MSK/fibro related. Inpatient cardiac work up completely normal which is assuring.  Other orders  -     [facility] Referral to Chronic Care Management (CCM)        Return in 4 weeks (on 2/21/2024).

## 2024-02-09 ENCOUNTER — TELEPHONE (OUTPATIENT)
Dept: INTERNAL MEDICINE CLINIC | Facility: CLINIC | Age: 79
End: 2024-02-09

## 2024-02-09 DIAGNOSIS — Z00.00 LABORATORY EXAMINATION ORDERED AS PART OF A COMPLETE PHYSICAL EXAMINATION: ICD-10-CM

## 2024-02-09 DIAGNOSIS — E03.9 ACQUIRED HYPOTHYROIDISM: Primary | ICD-10-CM

## 2024-02-09 NOTE — TELEPHONE ENCOUNTER
Future Appointments   Date Time Provider Department Center   2/12/2024  2:30 PM Kathi Sow, DO [id number] [facility] [place]   2/13/2024  1:20 PM [facility] KENAN RM1 [facility]   2/20/2024  1:20 PM PF DEXA RM1 PF DEXA [place]   2/21/2024  1:20 PM DOREEN Malone,  ENINAPER2 [facility] [place]   2/21/2024  2:40 PM Francis Taylor DO [facility]   3/4/2024 11:20 AM Micheal Clifford MD [facility] [facility]     CBC,BMP,Lipid done 01/19/2024. Other CPE labs pended.

## 2024-02-16 ENCOUNTER — TELEPHONE (OUTPATIENT)
Facility: CLINIC | Age: 79
End: 2024-02-16

## 2024-02-20 ENCOUNTER — HOSPITAL ENCOUNTER (OUTPATIENT)
Dept: BONE DENSITY | Age: 79
Discharge: HOME | End: 2024-02-20
Attending: INTERNAL MEDICINE
Payer: MEDICARE

## 2024-02-20 DIAGNOSIS — M81.0 AGE-RELATED OSTEOPOROSIS WITHOUT CURRENT PATHOLOGICAL FRACTURE: ICD-10-CM

## 2024-02-20 PROCEDURE — 77080 DXA BONE DENSITY AXIAL: CPT | Performed by: INTERNAL MEDICINE

## 2024-02-21 ENCOUNTER — OFFICE VISIT (OUTPATIENT)
Dept: SURGERY | Facility: CLINIC | Age: 79
End: 2024-02-21
Payer: MEDICARE

## 2024-02-21 VITALS
HEIGHT: 59 IN | BODY MASS INDEX: 20.16 KG/M2 | DIASTOLIC BLOOD PRESSURE: 60 MMHG | SYSTOLIC BLOOD PRESSURE: 110 MMHG | HEART RATE: 71 BPM | WEIGHT: 100 LBS

## 2024-02-21 DIAGNOSIS — G96.198 EPIDURAL MASS: Primary | ICD-10-CM

## 2024-02-21 PROCEDURE — 3074F SYST BP LT 130 MM HG: CPT | Performed by: NEUROLOGICAL SURGERY

## 2024-02-21 PROCEDURE — 1160F RVW MEDS BY RX/DR IN RCRD: CPT | Performed by: NEUROLOGICAL SURGERY

## 2024-02-21 PROCEDURE — 3078F DIAST BP <80 MM HG: CPT | Performed by: NEUROLOGICAL SURGERY

## 2024-02-21 PROCEDURE — 99214 OFFICE O/P EST MOD 30 MIN: CPT | Performed by: NEUROLOGICAL SURGERY

## 2024-02-21 PROCEDURE — 1159F MED LIST DOCD IN RCRD: CPT | Performed by: NEUROLOGICAL SURGERY

## 2024-02-21 PROCEDURE — 3008F BODY MASS INDEX DOCD: CPT | Performed by: NEUROLOGICAL SURGERY

## 2024-02-21 RX ORDER — RIBOFLAVIN (VITAMIN B2) 100 MG
1 TABLET ORAL DAILY
COMMUNITY
Start: 2024-01-19 | End: 2024-02-21

## 2024-02-21 RX ORDER — ALBUTEROL SULFATE 90 UG/1
2 AEROSOL, METERED RESPIRATORY (INHALATION) EVERY 6 HOURS
COMMUNITY
Start: 2024-01-19

## 2024-02-21 NOTE — PATIENT INSTRUCTIONS
Refill policies:    Allow 2-3 business days for refills; controlled substances may take longer.  Contact your pharmacy at least 5 days prior to running out of medication and have them send an electronic request or submit request through the “request refill” option in your [employer] account.  Refills are not addressed on weekends; covering physicians do not authorize routine medications on weekends.  No narcotics or controlled substances are refilled after noon on Fridays or by on call physicians.  By law, narcotics must be electronically prescribed.  A 30 day supply with no refills is the maximum allowed.  If your prescription is due for a refill, you may be due for a follow up appointment.  To best provide you care, patients receiving routine medications need to be seen at least once a year.  Patients receiving narcotic/controlled substance medications need to be seen at least once every 3 months.  In the event that your preferred pharmacy does not have the requested medication in stock (e.g. Backordered), it is your responsibility to find another pharmacy that has the requested medication available.  We will gladly send a new prescription to that pharmacy at your request.    Scheduling Tests:    If your physician has ordered radiology tests such as MRI or CT scans, please contact Central Scheduling at [phone] right away to schedule the test.  Once scheduled, the [facility] Centralized Referral Team will work with your insurance carrier to obtain pre-certification or prior authorization.  Depending on your insurance carrier, approval may take 3-10 days.  It is highly recommended patients assure they have received an authorization before having a test performed.  If test is done without insurance authorization, patient may be responsible for the entire amount billed.      Precertification and Prior Authorizations:  If your physician has recommended that you have a procedure or additional testing performed the [facility]  Centralized Referral Team will contact your insurance carrier to obtain pre-certification or prior authorization.    You are strongly encouraged to contact your insurance carrier to verify that your procedure/test has been approved and is a COVERED benefit.  Although the [facility] Centralized Referral Team does its due diligence, the insurance carrier gives the disclaimer that \"Although the procedure is authorized, this does not guarantee payment.\"    Ultimately the patient is responsible for payment.   Thank you for your understanding in this matter.  Paperwork Completion:  If you require FMLA or disability paperwork for your recovery, please make sure to either drop it off or have it faxed to our office at [phone]. Be sure the form has your name and date of birth on it.  The form will be faxed to our Forms Department and they will complete it for you.  There is a 25$ fee for all forms that need to be filled out.  Please be aware there is a 10-14 day turnaround time.  You will need to sign a release of information (MARC) form if your paperwork does not come with one.  You may call the Forms Department with any questions at [phone].  Their fax number is [phone].

## 2024-02-21 NOTE — PROGRESS NOTES
[facility]  Neurosurgery Clinic Visit  Hospital Follow-Up    Radha Pereira Patient Status:  No patient class for patient encounter    1933 MRN [id number]   Location [facility] Attending No att. providers found   Hosp Day # 0 PCP Christ Fay MD     REASON FOR VISIT: T12-L2 epidural cystic lesion    SUBJECTIVE     Arianna Colin is a pleasant 79 year old female with PMH of OA, osteoporosis, fibromyalgia, asthma, and hyperthyroidism who presents for hospital follow up. Patient was evaluated while inpatient on 24 (see hospital consult note below for full details). She remained neurologically intact while hospitalized, thus was discharged home with orders to f/u with our service in 1 month with repeat MRI w+wo.    Currently, patient reports persist pain to the mid-thoracic region and to B/L hips and buttocks. Pain is aggravated by certain movements, prolonged sitting, walking. Pain occurs daily, is sharp/shooting in quality. She reports intermittent burning sensation to both thighs, circumferentially. This occasionally extends into the feet. She feels increasingly SOB and fatigued when ambulating even short distances. Denies any new numbness, tingling, weakness or pain. Denies changes in bowel or bladder habits. Denies history of trauma or injury to the spine. Reports history of several falls in the past, last occurred 2 years ago. Reports history of chronic generalized pain secondary to fibromyalgia.     Prior Hx (24)  Arianna Colin is a(n) 78 year old female w/ a PMH of asthma, OA, and hyperthyroidism who presented to the ED yesterday w/ c/o chest dysesthesias, right sided hip, and bilateral knee pain.  Pt states her symptoms began about 2-3 months ago.  She denies weakness or paresthesias.  Pt was admitted for cardiac work up.  Pt had a MRI lumbar was completed which showed a complex cystic lesion from T12-L3.  Neurosurgery was consulted for  this.       MEDICATIONS     Cannot display prior to admission medications because the patient has not been admitted in this contact.      (Not in a hospital admission)    REVIEW OF SYSTEMS     Comprehensive Review of Systems obtained, and is negative other than that mentioned in the History of Present Illness.    Denies fever, chills, shortness of breath or chest pain.    PHYSICAL EXAMINATION     VITALS: /60   Pulse 71   Ht 59\"   Wt 100 lb (45.4 kg)   BMI 20.20 kg/m²     GENERAL: No acute distress, speech fluent, mood appropriate    HEENT: Normocephalic, atraumatic    MUSCULOSKELETAL: Even tone. Moving bilateral upper and lower extremities spontaneously and against resistance.    NEURO: Alert and oriented x3.  Face is symmetric.  PERRLA +3 brisk, EOMI.  CN 2-12 grossly intact.  Gait intact, slowed, non-antalgic. Sensation to light touch on bilateral upper and lower extremities intact.  BERNARDO x 4. Gait deferred.     LOWER EXTREMITY STRENGTH:    Iliospoas  Hamstrings  Quads  D-flexion  P-flexion EHL     Right 5 5 5 5 5 5     Left 5 5 5 5 5 5     UPPER EXTREMITY STRENGTH:    Deltoid  Biceps  Triceps   W.flexion  W.extension    Finger abduction     Right 5 5 5 5  5 5 5     Left 5 5 5 5 5 5 5     DTRs:     Biceps    Triceps   Brachioradialis     Patellar     Ankle     Right       2+         2+            2+         2+        2+     Left       2+         2+             2+         2+        2+      IMAGING     MRI SPINE LUMBAR(CONTRAST ONLY) (CPT=72149)    Result Date: 1/19/2024  CONCLUSION:  Limited post-contrast MRI of the lumbar spine performed for assessment of a previously visualized fluid collection in left posterior epidural space extending from lower T12 level to the upper L2 level.  The previously visualized collection demonstrates trace peripheral enhancement, no central enhancement, no significant gradient susceptibility, and mild local mass effect.  The collection has an overall measurement of 2.2 x  0.9 x 4.4 cm.  Differential considerations would include an arachnoid cyst, atypical perineural cyst, changes related to CSF leak, resolving old epidural hematoma, with other etiologies not entirely excluded.  Clinical correlation recommended.  MRI of the lumbar spine with without contrast in 3-6 months is suggested to assess for resolution/stability.  LOCATION:  [id number]   Dictated by (CST): Mariano King MD on 1/19/2024 at 1:24 PM     Finalized by (CST): Mariano King MD on 1/19/2024 at 1:29 PM       MRI SPINE LUMBAR (CPT=72148)    Result Date: 1/18/2024  CONCLUSION:  1. There is a posterior epidural complex cystic lesion extending from proximately T12-L1 to L2-3 measuring 4.3 x 0.9 x 2.3 cm.  Secondary mild central canal stenosis at this level.  This is a nonspecific lesion and has a nonaggressive appearance.  Further assessment with contrast enhanced MRI is recommended. 2. Mild multilevel degenerative disc changes and facet changes, with evidence of mild bilateral subarticular and neural foraminal stenosis at L5-S1.   LOCATION:  [facility]   Dictated by (CST): Ashlie Braden DO on 1/18/2024 at 7:26 PM     Finalized by (CST): Ashlie Braden DO on 1/18/2024 at 7:36 PM       XR PELVIS (COMPLETE MIN 3 VIEWS) (CPT=72190)    Result Date: 1/18/2024  CONCLUSION:  Mild osteoarthritic changes in both hips.  No fracture or dislocation.  Mild SI joint DJD also noted.   LOCATION:  [facility]   Dictated by (CST): Fransisco Lala MD on 1/18/2024 at 4:13 PM     Finalized by (CST): Fransisco Lala MD on 1/18/2024 at 4:15 PM       XR THORACIC SPINE (3 VIEWS) (CPT=72072)    Result Date: 1/18/2024  CONCLUSION:  Slight worsening in degenerative disc disease in the thoracic spine.  No acute fracture or dislocation.  Thoracic kyphosis is slightly exaggerated and slightly more prominent than on the previous exam also.   LOCATION:  [facility]    Dictated by (CST): Fransisco Lala MD on 1/18/2024 at 4:11 PM     Finalized by (CST): Spike  MD Fransisco on 1/18/2024 at 4:13 PM       XR LUMBAR SPINE (MIN 2 VIEWS) (CPT=72100)    Result Date: 1/18/2024  CONCLUSION:  Degenerate arthritic changes are seen.  There is no acute fracture, subluxation or dislocation.   LOCATION:  [facility]   Dictated by (CST): Fransisco Lala MD on 1/18/2024 at 4:10 PM     Finalized by (CST): Fransisco Lala MD on 1/18/2024 at 4:11 PM       XR CERVICAL SPINE (2-3 VIEWS) (CPT=72040)    Result Date: 1/18/2024  CONCLUSION:  Degenerative disc disease and mild facet arthropathy.  No significant change from previous.   LOCATION:  [facility]   Dictated by (CST): Fransisco Lala MD on 1/18/2024 at 4:07 PM     Finalized by (CST): Fransisco Lala MD on 1/18/2024 at 4:10 PM       CT ABDOMEN+PELVIS(CPT=74176)    Result Date: 1/18/2024  CONCLUSION:  1. Copious amount of stool throughout the colon.  No free fluid.  Correlate clinically.  ED M.D. notified of these findings with preliminary radiology report from [facility] services.    LOCATION:  [facility]   Dictated by (CST): Nichol Gordon MD on 1/18/2024 at 6:48 AM     Finalized by (CST): Nichol Gordon MD on 1/18/2024 at 6:51 AM       XR CHEST AP PORTABLE  (CPT=71045)    Result Date: 1/18/2024  CONCLUSION:  No active cardiopulmonary process identified.  A preliminary report was provided by the [facility] teleradiology service.  LOCATION:  [id number]      Dictated by (CST): Mariano King MD on 1/18/2024 at 6:34 AM     Finalized by (CST): Mariano King MD on 1/18/2024 at 6:34 AM          ASSESSMENT AND PLAN     ASSESSMENT:   1. Epidural mass      PLAN:   Complete repeat MRI lumbar W+WO as previously ordered  F/U in clinic with Dr. Gaspar after imaging to review and discuss  Instructed to return to the ED with any new or worsening symptoms. Red Flag symptoms discussed.    Patient was seen and examined by myself and by Dr. Malone.    Glenda Ramirez, LIN  [facility]  [address], [address]  [place], [place] [id number] [phone]

## 2024-02-21 NOTE — PROGRESS NOTES
New patient:  Reason for visit:   HFU - EXTRA DURAL LESION  Pain in Buttocks and low back    Estimated time of onset:  1 month    Numeric Rating Scale:   Pain at Present:  5/10                                                                                                              Distribution of Pain:    bilateral    Past Treatments for Current Pain Condition:   Physical Therapy and NSAIDS    Response to treatment: some relief    Prior diagnostic testing related to this condition:      MRI in chart, MRI Ordered and scheduled.

## 2024-02-22 ENCOUNTER — OFFICE VISIT (OUTPATIENT)
Dept: SURGERY | Facility: CLINIC | Age: 79
End: 2024-02-22
Payer: MEDICARE

## 2024-02-22 VITALS
WEIGHT: 100 LBS | SYSTOLIC BLOOD PRESSURE: 110 MMHG | DIASTOLIC BLOOD PRESSURE: 66 MMHG | HEIGHT: 59 IN | HEART RATE: 79 BPM | BODY MASS INDEX: 20.16 KG/M2

## 2024-02-22 DIAGNOSIS — G96.198 EPIDURAL MASS: Primary | ICD-10-CM

## 2024-02-22 PROCEDURE — 3074F SYST BP LT 130 MM HG: CPT | Performed by: NEUROLOGICAL SURGERY

## 2024-02-22 PROCEDURE — 99213 OFFICE O/P EST LOW 20 MIN: CPT | Performed by: NEUROLOGICAL SURGERY

## 2024-02-22 PROCEDURE — 1159F MED LIST DOCD IN RCRD: CPT | Performed by: NEUROLOGICAL SURGERY

## 2024-02-22 PROCEDURE — 3078F DIAST BP <80 MM HG: CPT | Performed by: NEUROLOGICAL SURGERY

## 2024-02-22 PROCEDURE — 3008F BODY MASS INDEX DOCD: CPT | Performed by: NEUROLOGICAL SURGERY

## 2024-02-22 RX ORDER — PREDNISONE 50 MG/1
TABLET ORAL
Qty: 3 TABLET | Refills: 0 | Status: SHIPPED | OUTPATIENT
Start: 2024-02-22

## 2024-02-22 NOTE — PATIENT INSTRUCTIONS
Refill policies:    Allow 2-3 business days for refills; controlled substances may take longer.  Contact your pharmacy at least 5 days prior to running out of medication and have them send an electronic request or submit request through the “request refill” option in your [employer] account.  Refills are not addressed on weekends; covering physicians do not authorize routine medications on weekends.  No narcotics or controlled substances are refilled after noon on Fridays or by on call physicians.  By law, narcotics must be electronically prescribed.  A 30 day supply with no refills is the maximum allowed.  If your prescription is due for a refill, you may be due for a follow up appointment.  To best provide you care, patients receiving routine medications need to be seen at least once a year.  Patients receiving narcotic/controlled substance medications need to be seen at least once every 3 months.  In the event that your preferred pharmacy does not have the requested medication in stock (e.g. Backordered), it is your responsibility to find another pharmacy that has the requested medication available.  We will gladly send a new prescription to that pharmacy at your request.    Scheduling Tests:    If your physician has ordered radiology tests such as MRI or CT scans, please contact Central Scheduling at [phone] right away to schedule the test.  Once scheduled, the [facility] Centralized Referral Team will work with your insurance carrier to obtain pre-certification or prior authorization.  Depending on your insurance carrier, approval may take 3-10 days.  It is highly recommended patients assure they have received an authorization before having a test performed.  If test is done without insurance authorization, patient may be responsible for the entire amount billed.      Precertification and Prior Authorizations:  If your physician has recommended that you have a procedure or additional testing performed the [facility]  Centralized Referral Team will contact your insurance carrier to obtain pre-certification or prior authorization.    You are strongly encouraged to contact your insurance carrier to verify that your procedure/test has been approved and is a COVERED benefit.  Although the [facility] Centralized Referral Team does its due diligence, the insurance carrier gives the disclaimer that \"Although the procedure is authorized, this does not guarantee payment.\"    Ultimately the patient is responsible for payment.   Thank you for your understanding in this matter.  Paperwork Completion:  If you require FMLA or disability paperwork for your recovery, please make sure to either drop it off or have it faxed to our office at [phone]. Be sure the form has your name and date of birth on it.  The form will be faxed to our Forms Department and they will complete it for you.  There is a 25$ fee for all forms that need to be filled out.  Please be aware there is a 10-14 day turnaround time.  You will need to sign a release of information (MARC) form if your paperwork does not come with one.  You may call the Forms Department with any questions at [phone].  Their fax number is [phone].

## 2024-02-22 NOTE — PROGRESS NOTES
New patient:  Reason for visit:   Epidural mass    Numeric Rating Scale:         Pain at Present:5/10                                                                                                                    Distribution of Pain: mid-low back     Prior diagnostic testing related to this condition:    MRI spine lumbar in chart DOS 01/19/24  Pt has MRI spine lumbar scheduled on 03/20/24
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
## 2024-02-26 ENCOUNTER — TELEPHONE (OUTPATIENT)
Dept: INTERNAL MEDICINE CLINIC | Facility: CLINIC | Age: 79
End: 2024-02-26

## 2024-02-26 NOTE — TELEPHONE ENCOUNTER
Called and spoke w/ pt. Notified AD can help with temporary pain relief, however has not evaluated pt since new finding. Encouraged pt to reach out to neurosurgery as they evaluated pt last week and should be aware that pain is worse than when she was seen last week. Pt stated she can do that. Pt stated she has the number and will call. Notified we will see pt next week for annual physical.     RAMSES KING

## 2024-02-26 NOTE — TELEPHONE ENCOUNTER
Rita, from [facility], stated pt is complaining of back pain 8/10.  Rita stated pt has had back pain as she has a mass, but it hasn't been this bad before.  Pt has not taken any medication, plans to take OTC pain meds.  RAMSES Salinas added pt has had nausea and dizziness yesterday, but all gone now.    Plan of care - pt will be discharged in two weeks.

## 2024-02-26 NOTE — TELEPHONE ENCOUNTER
Attempted to call  nurse Rita. Call will not dial out with number listed.     LOV 1/24/24  Called and spoke w/ pt. C/o pain to back, unable to move much. Pain is 8/10. Not sharp pain, constant pain. Feels like there is a rock on her back. Pt has a back brace, not helping much. Trying to get OTC pain medicine. Pt was going to try Motrin. Pt stated her caregiver is not coming anymore because her car is broken and waiting to hear back from agency about a new one. Encouraged to reach out to agency and see about getting a new caregiver. Pt has upcoming apt with AD 3/4/24. Pt saw neurosurgery 2/22/24. Pain is worse now than when she saw neurosurgery. Nausea and dizziness resolved per pt.     AD - Any further recs for back pain?

## 2024-02-26 NOTE — TELEPHONE ENCOUNTER
PSR - Do you have call back number so we can speak with [facility] nurse?

## 2024-02-26 NOTE — TELEPHONE ENCOUNTER
I can offer her temporary pain relief, however, I have not seen the patient myself since May and she was evaluated for this concern by the neurosurgery service on 2/21 and 2/22. Please confirm she has contacted their office and obtain their recommendations. Again, I have not seen her since this epidural mass was discovered.

## 2024-02-28 ENCOUNTER — TELEPHONE (OUTPATIENT)
Dept: SURGERY | Facility: CLINIC | Age: 79
End: 2024-02-28

## 2024-02-28 VITALS — WEIGHT: 100 LBS | HEIGHT: 57 IN | BODY MASS INDEX: 21.57 KG/M2

## 2024-02-28 NOTE — TELEPHONE ENCOUNTER
Patent is having a Myelogram procedure done on 3/7/24 She would like to speak with someone in regards to some concerns /questions she has about the procedure  !.does it detect cancer?  2. She is allergic to the dye

## 2024-02-29 ENCOUNTER — TELEPHONE (OUTPATIENT)
Facility: CLINIC | Age: 79
End: 2024-02-29

## 2024-02-29 NOTE — TELEPHONE ENCOUNTER
Phoned pt to remind of overdue labs that Dr. Serrano would like to be done prior to OV on Tuesday, 3/05/24.    Pt verbalized understanding and stated that she will go to [facility] to get them done this weekend.

## 2024-03-01 ENCOUNTER — LAB ENCOUNTER (OUTPATIENT)
Dept: LAB | Age: 79
End: 2024-03-01
Attending: INTERNAL MEDICINE
Payer: MEDICARE

## 2024-03-01 DIAGNOSIS — Z00.00 LABORATORY EXAMINATION ORDERED AS PART OF A COMPLETE PHYSICAL EXAMINATION: ICD-10-CM

## 2024-03-01 DIAGNOSIS — E03.9 ACQUIRED HYPOTHYROIDISM: ICD-10-CM

## 2024-03-01 DIAGNOSIS — E06.3 HASHIMOTO'S DISEASE: ICD-10-CM

## 2024-03-01 LAB
ALBUMIN SERPL-MCNC: 4.3 G/DL (ref 3.4–5)
ALBUMIN/GLOB SERPL: 1.5 {RATIO} (ref 1–2)
ALP LIVER SERPL-CCNC: 71 U/L
ALT SERPL-CCNC: 17 U/L
ANION GAP SERPL CALC-SCNC: 2 MMOL/L (ref 0–18)
AST SERPL-CCNC: 18 U/L (ref 15–37)
BILIRUB SERPL-MCNC: 0.9 MG/DL (ref 0.1–2)
BUN BLD-MCNC: 18 MG/DL (ref 9–23)
CALCIUM BLD-MCNC: 9.3 MG/DL (ref 8.5–10.1)
CHLORIDE SERPL-SCNC: 108 MMOL/L (ref 98–112)
CO2 SERPL-SCNC: 29 MMOL/L (ref 21–32)
CREAT BLD-MCNC: 0.69 MG/DL
EGFRCR SERPLBLD CKD-EPI 2021: 87 ML/MIN/1.73M2 (ref 60–?)
FASTING STATUS PATIENT QL REPORTED: YES
GLOBULIN PLAS-MCNC: 2.8 G/DL (ref 2.8–4.4)
GLUCOSE BLD-MCNC: 88 MG/DL (ref 70–99)
OSMOLALITY SERPL CALC.SUM OF ELEC: 289 MOSM/KG (ref 275–295)
POTASSIUM SERPL-SCNC: 4 MMOL/L (ref 3.5–5.1)
PROT SERPL-MCNC: 7.1 G/DL (ref 6.4–8.2)
SODIUM SERPL-SCNC: 139 MMOL/L (ref 136–145)
T3 SERPL-MCNC: 89 NG/DL (ref 60–181)
T4 FREE SERPL-MCNC: 1 NG/DL (ref 0.8–1.7)
TSI SER-ACNC: 4.65 MIU/ML (ref 0.36–3.74)

## 2024-03-01 PROCEDURE — 84443 ASSAY THYROID STIM HORMONE: CPT

## 2024-03-01 PROCEDURE — 84439 ASSAY OF FREE THYROXINE: CPT

## 2024-03-01 PROCEDURE — 36415 COLL VENOUS BLD VENIPUNCTURE: CPT

## 2024-03-01 PROCEDURE — 80053 COMPREHEN METABOLIC PANEL: CPT

## 2024-03-01 PROCEDURE — 84480 ASSAY TRIIODOTHYRONINE (T3): CPT

## 2024-03-04 ENCOUNTER — OFFICE VISIT (OUTPATIENT)
Dept: INTERNAL MEDICINE CLINIC | Facility: CLINIC | Age: 79
End: 2024-03-04
Payer: MEDICARE

## 2024-03-04 VITALS
HEART RATE: 76 BPM | OXYGEN SATURATION: 94 % | TEMPERATURE: 99 F | DIASTOLIC BLOOD PRESSURE: 70 MMHG | HEIGHT: 58 IN | SYSTOLIC BLOOD PRESSURE: 112 MMHG | BODY MASS INDEX: 21.45 KG/M2 | WEIGHT: 102.19 LBS

## 2024-03-04 DIAGNOSIS — E06.3 HASHIMOTO'S DISEASE: ICD-10-CM

## 2024-03-04 DIAGNOSIS — M19.041 PRIMARY OSTEOARTHRITIS OF BOTH HANDS: ICD-10-CM

## 2024-03-04 DIAGNOSIS — M51.34 THORACIC DEGENERATIVE DISC DISEASE: ICD-10-CM

## 2024-03-04 DIAGNOSIS — M79.7 FIBROMYALGIA: ICD-10-CM

## 2024-03-04 DIAGNOSIS — R45.89 DEPRESSED MOOD: ICD-10-CM

## 2024-03-04 DIAGNOSIS — M19.042 PRIMARY OSTEOARTHRITIS OF BOTH HANDS: ICD-10-CM

## 2024-03-04 DIAGNOSIS — H52.03 HYPEROPIA OF BOTH EYES WITH ASTIGMATISM: ICD-10-CM

## 2024-03-04 DIAGNOSIS — M25.50 POLYARTHRALGIA: ICD-10-CM

## 2024-03-04 DIAGNOSIS — I73.00 RAYNAUD'S DISEASE WITHOUT GANGRENE: ICD-10-CM

## 2024-03-04 DIAGNOSIS — M81.0 AGE-RELATED OSTEOPOROSIS WITHOUT CURRENT PATHOLOGICAL FRACTURE: ICD-10-CM

## 2024-03-04 DIAGNOSIS — F32.9 REACTIVE DEPRESSION: ICD-10-CM

## 2024-03-04 DIAGNOSIS — H52.203 HYPEROPIA OF BOTH EYES WITH ASTIGMATISM: ICD-10-CM

## 2024-03-04 DIAGNOSIS — E03.9 ACQUIRED HYPOTHYROIDISM: ICD-10-CM

## 2024-03-04 DIAGNOSIS — Z00.00 ENCOUNTER FOR ANNUAL HEALTH EXAMINATION: Primary | ICD-10-CM

## 2024-03-04 DIAGNOSIS — M50.30 DDD (DEGENERATIVE DISC DISEASE), CERVICAL: ICD-10-CM

## 2024-03-04 DIAGNOSIS — M51.37 DEGENERATIVE DISC DISEASE AT L5-S1 LEVEL: ICD-10-CM

## 2024-03-04 DIAGNOSIS — G31.84 MCI (MILD COGNITIVE IMPAIRMENT): ICD-10-CM

## 2024-03-04 DIAGNOSIS — J45.40 MODERATE PERSISTENT ASTHMA WITHOUT COMPLICATION (HCC): ICD-10-CM

## 2024-03-04 DIAGNOSIS — H25.13 AGE-RELATED NUCLEAR CATARACT, BILATERAL: ICD-10-CM

## 2024-03-04 DIAGNOSIS — Z86.19 HISTORY OF LYME DISEASE: ICD-10-CM

## 2024-03-04 DIAGNOSIS — M16.0 PRIMARY OSTEOARTHRITIS OF BOTH HIPS: ICD-10-CM

## 2024-03-04 DIAGNOSIS — G96.198 EPIDURAL MASS: ICD-10-CM

## 2024-03-04 DIAGNOSIS — F41.9 ANXIETY: ICD-10-CM

## 2024-03-04 PROBLEM — R29.810 FACIAL DROOP: Status: RESOLVED | Noted: 2023-07-18 | Resolved: 2024-03-04

## 2024-03-04 PROBLEM — R07.9 CHEST PAIN: Status: RESOLVED | Noted: 2023-07-18 | Resolved: 2024-03-04

## 2024-03-04 PROBLEM — J44.89 ASTHMA WITH COPD (CHRONIC OBSTRUCTIVE PULMONARY DISEASE) (HCC): Chronic | Status: RESOLVED | Noted: 2023-07-27 | Resolved: 2024-03-04

## 2024-03-04 PROBLEM — J44.89 ASTHMA WITH COPD (CHRONIC OBSTRUCTIVE PULMONARY DISEASE): Chronic | Status: RESOLVED | Noted: 2023-07-27 | Resolved: 2024-03-04

## 2024-03-04 PROBLEM — M51.379 DEGENERATIVE DISC DISEASE AT L5-S1 LEVEL: Status: ACTIVE | Noted: 2024-03-04

## 2024-03-04 PROBLEM — R29.3: Status: RESOLVED | Noted: 2023-07-18 | Resolved: 2024-03-04

## 2024-03-04 PROBLEM — R07.89 MUSCULOSKELETAL CHEST PAIN: Status: RESOLVED | Noted: 2024-01-18 | Resolved: 2024-03-04

## 2024-03-04 PROBLEM — R06.09 EXERTIONAL DYSPNEA: Status: RESOLVED | Noted: 2023-07-17 | Resolved: 2024-03-04

## 2024-03-04 PROBLEM — M79.604 RIGHT LEG PAIN: Status: RESOLVED | Noted: 2024-01-19 | Resolved: 2024-03-04
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
## 2024-03-04 NOTE — PATIENT INSTRUCTIONS
Arianna Colin's SCREENING SCHEDULE   Tests on this list are recommended by your physician but may not be covered, or covered at this frequency, by your insurer.   Please check with your insurance carrier before scheduling to verify coverage.   PREVENTATIVE SERVICES FREQUENCY &  COVERAGE DETAILS LAST COMPLETION DATE   Diabetes Screening    Fasting Blood Sugar /  Glucose    One screening every 12 months if never tested or if previously tested but not diagnosed with pre-diabetes   One screening every 6 months if diagnosed with pre-diabetes Lab Results   Component Value Date    GLU 88 03/01/2024        Cardiovascular Disease Screening    Lipid Panel  Cholesterol  Lipoprotein (HDL)  Triglycerides Covered every 5 years for all Medicare beneficiaries without apparent signs or symptoms of cardiovascular disease Lab Results   Component Value Date    CHOLEST 143 01/19/2024    HDL 49 01/19/2024    LDL 74 01/19/2024    LDLD 98 06/01/2023    TRIG 108 01/19/2024         Electrocardiogram (EKG)   Covered if needed at Welcome to Medicare, and non-screening if indicated for medical reasons 01/18/2024      Ultrasound Screening for Abdominal Aortic Aneurysm (AAA) Covered once in a lifetime for one of the following risk factors   • Men who are 65-75 years old and have ever smoked   • Anyone with a family history -     Colorectal Cancer Screening  Covered for ages 50-85; only need ONE of the following:    Colonoscopy   Covered every 10 years    Covered every 2 years if patient is at high risk or previous colonoscopy was abnormal 01/01/2018    Health Maintenance   Topic Date Due   • Colorectal Cancer Screening  01/01/2023       Flexible Sigmoidoscopy   Covered every 4 years -    Fecal Occult Blood Test Covered annually -   Bone Density Screening    Bone density screening    Covered every 2 years after age 65 if diagnosed with risk of osteoporosis or estrogen deficiency.    Covered yearly for long-term glucocorticoid medication use  (Steroids) Last Dexa Scan:    XR DEXA BONE DENSITOMETRY (CPT=77080) 02/20/2024      No recommendations at this time   Pap and Pelvic    Pap   Covered every 2 years for women at normal risk; Annually if at high risk -  No recommendations at this time    Chlamydia Annually if high risk -  No recommendations at this time   Screening Mammogram    Mammogram     Recommend annually for all female patients aged 40 and older    One baseline mammogram covered for patients aged 35-39 -    No recommendations at this time    Immunizations    Influenza Covered once per flu season  Please get every year -  No recommendations at this time    Pneumococcal Each vaccine ([id number] & [id number]) covered once after 65 Prevnar 13: -    [id number]: -     No recommendations at this time    Hepatitis B One screening covered for patients with certain risk factors   -  No recommendations at this time    Tetanus Toxoid Not covered by Medicare Part B unless medically necessary (cut with metal); may be covered with your pharmacy prescription benefits -    Tetanus, Diptheria and Pertusis TD and TDaP Not covered by Medicare Part B -  No recommendations at this time    Zoster Not covered by Medicare Part B; may be covered with your pharmacy  prescription benefits -  No recommendations at this time     Annual Monitoring of Persistent Medications (ACE/ARB, digoxin diuretics, anticonvulsants)    Potassium Annually Lab Results   Component Value Date    K 4.0 03/01/2024         Creatinine   Annually Lab Results   Component Value Date    CREATSERUM 0.69 03/01/2024         BUN Annually Lab Results   Component Value Date    BUN 18 03/01/2024       Drug Serum Conc Annually No results found for: \"DIGOXIN\", \"DIG\", \"VALP\"

## 2024-03-05 ENCOUNTER — OFFICE VISIT (OUTPATIENT)
Facility: CLINIC | Age: 79
End: 2024-03-05
Payer: MEDICARE

## 2024-03-05 ENCOUNTER — TELEPHONE (OUTPATIENT)
Dept: INTERNAL MEDICINE CLINIC | Facility: CLINIC | Age: 79
End: 2024-03-05

## 2024-03-05 VITALS
HEART RATE: 78 BPM | SYSTOLIC BLOOD PRESSURE: 120 MMHG | BODY MASS INDEX: 21.41 KG/M2 | WEIGHT: 102 LBS | HEIGHT: 58 IN | RESPIRATION RATE: 16 BRPM | OXYGEN SATURATION: 98 % | DIASTOLIC BLOOD PRESSURE: 88 MMHG

## 2024-03-05 DIAGNOSIS — E06.3 HASHIMOTO'S DISEASE: Primary | ICD-10-CM

## 2024-03-05 PROBLEM — J44.89 ASTHMA WITH COPD (CHRONIC OBSTRUCTIVE PULMONARY DISEASE) (HCC): Chronic | Status: ACTIVE | Noted: 2024-03-05

## 2024-03-05 PROBLEM — J44.89 ASTHMA WITH COPD (CHRONIC OBSTRUCTIVE PULMONARY DISEASE): Chronic | Status: ACTIVE | Noted: 2024-03-05

## 2024-03-05 PROCEDURE — 1160F RVW MEDS BY RX/DR IN RCRD: CPT | Performed by: STUDENT IN AN ORGANIZED HEALTH CARE EDUCATION/TRAINING PROGRAM

## 2024-03-05 PROCEDURE — 99213 OFFICE O/P EST LOW 20 MIN: CPT | Performed by: STUDENT IN AN ORGANIZED HEALTH CARE EDUCATION/TRAINING PROGRAM

## 2024-03-05 PROCEDURE — 3074F SYST BP LT 130 MM HG: CPT | Performed by: STUDENT IN AN ORGANIZED HEALTH CARE EDUCATION/TRAINING PROGRAM

## 2024-03-05 PROCEDURE — 1159F MED LIST DOCD IN RCRD: CPT | Performed by: STUDENT IN AN ORGANIZED HEALTH CARE EDUCATION/TRAINING PROGRAM

## 2024-03-05 PROCEDURE — 3008F BODY MASS INDEX DOCD: CPT | Performed by: STUDENT IN AN ORGANIZED HEALTH CARE EDUCATION/TRAINING PROGRAM

## 2024-03-05 PROCEDURE — 3079F DIAST BP 80-89 MM HG: CPT | Performed by: STUDENT IN AN ORGANIZED HEALTH CARE EDUCATION/TRAINING PROGRAM

## 2024-03-05 NOTE — TELEPHONE ENCOUNTER
Rendering Provider Information  Received: Today  Sakina Shabnam  [facility] Clinical Staff  Hello,  In order to process this referral request, I need additional information.  Please provide the name of a specific Rendering Provider you would like the patient to see.  Thank you,  Charito   Referral  Referral # [id number]            Referral Information    Referral # Creation Date Referral Status Status Update    [id number] 03/04/2024 Pending - Physician Office 03/05/2024: Status History        Status Reason Referral Type Referral Reasons Referral Class   none Integrative Medicine none Internal        To Specialty To Provider To Location/Place of Service To Department   PAIN MANAGEMENT none none [facility]        To Vendor Referred By By Location/Place of Service By Department   none Micheal Clifford MD [address] [facility] [address]        Priority Start Date Expiration Date Referral Entered By   Routine 03/04/2024 03/04/2025 Micheal Clifford MD        Visits Requested Visits Authorized Visits Completed Visits Scheduled   20 20           Procedure Information    Service Details  Procedure Modifiers Revenue Code Provider Requested Approved   [id number] - ACUPUNCTURE [place] - INTERNAL REFERRAL none none  1 1       Diagnosis Information    Diagnosis   M79.7 (ICD-10-CM) - 729.1 (ICD-9-CM) - Fibromyalgia   M25.50 (ICD-10-CM) - 719.49 (ICD-9-CM) - Polyarthralgia

## 2024-03-05 NOTE — PROGRESS NOTES
Endocrinology Clinic Note    Name: Arianna Colin    Date: 3/5/2024       HISTORY OF PRESENT ILLNESS   Arianna Colin is a 79 year old female with PMHx significant for astham, fibromyalgia, Hashimoto's, osteoporosis who presents for endocrine consultation for thyroid management.    Initial HPI consult in Oct 2023  Pt was seen by the endo service in 2023 when she was hospitalized for syncopal/seizure activity. Endocrine consulted for subclinical hypoTH, due to pt having stopped LT4 25mcg several months ago. TFTs show mild subclinical hypoTH, which may be partially contributing to her fatigue and constipation. Discussed with pt that only generic LT4 is available in the hospital and she can trial Synthroid in the outpatient setting. Pt was amenable to restarting LT4 25mcg.     2023 - fT4 0.9, TSH 11.9 -- started LT4 25mcg while in the hospital; pt did not  Rx after DC, instead has resumed a thyroid preparation from her holistic provider  2023 - fT4 0.8, TSH 4.3     Had some constipation earlier that has resolved. Is trying to arrange for endoscopy.    Interim hx:  2024  3/2024 - fT4 1.0, TSH 4.6, TT3 89 -- been on Synthroid 25mcg since last visit   Recently just started a supplement that 'supports thyroid' that contains tumeric, feels better  Has myelogram on Thurs and pt has temporarily stopped her meds x 1 week      PAST MEDICAL HISTORY:   Reviewed    PAST SURGICAL HISTORY:   Past Surgical History:   Procedure Laterality Date          CHOLECYSTECTOMY      COLONOSCOPY      EGD      HYSTERECTOMY      TOTAL ABDOM HYSTERECTOMY         CURRENT MEDICATIONS:    Current Outpatient Medications   Medication Sig Dispense Refill    PATIENT SUPPLIED MEDICATION daily. Gallbladder formula, super colon cleanse      albuterol (VENTOLIN HFA) 108 (90 Base) MCG/ACT Inhalation Aero Soln Inhale 2 puffs into the lungs every 6 (six) hours as needed.      fluticasone furoate-vilanterol (BREO  ELLIPTA) 100-25 MCG/ACT Inhalation Aerosol Powder, Breath Activated Inhale 1 puff into the lungs daily. (Patient taking differently: Inhale 1 puff into the lungs as needed.) 1 each 5    SYNTHROID 25 MCG Oral Tab Take 1 tablet (25 mcg total) by mouth before breakfast. 90 tablet 0    Cholecalciferol (VITAMIN D) 50 MCG (2000 UT) Oral Cap Take by mouth.      Turmeric (QC TUMERIC COMPLEX OR) Take by mouth.      vitamin B-12 50 MCG Oral Tab Take 1 tablet (50 mcg total) by mouth daily.      NON FORMULARY quersetin      Respiratory Therapy Supplies (NEBULIZER/TUBING/MOUTHPIECE) Does not apply Kit Use as directed. 1 each 0    COLLAGEN OR Take by mouth.      Omega-3 Fatty Acids (OMEGA 3 500) 500 MG Oral Cap Take 1 capsule by mouth daily. 625 mg      Ascorbic Acid (VITAMIN C) 100 MG Oral Tab Take 1 tablet (100 mg total) by mouth daily.      DIGESTIVE ENZYMES OR Take by mouth 2 (two) times a day.      Calcium-Magnesium (GABRIELLA-MAG OR) Take 600 mg by mouth in the morning and 600 mg before bedtime.      diphenhydrAMINE HCl 50 MG Oral Tab Take one (1) 50mg tablet by mouth one (1) hour before the examination. (Patient not taking: Reported on 3/4/2024) 1 tablet 0     Endocrine Medications            SYNTHROID 25 MCG Oral Tab            ALLERGIES:  Allergies   Allergen Reactions    Carbamazepine OTHER (SEE COMMENTS) and SWELLING     Facial swelling      Clonazepam OTHER (SEE COMMENTS)     Hallucinations  Hallucination  hallucinations      Other SHORTNESS OF BREATH, OTHER (SEE COMMENTS), RASH, FATIGUE and PAIN     Cerner Allergy Text Annotation: Contrast Dye    Pqq sepra    Soy Allergy HIVES    Isoflavones NAUSEA AND VOMITING    Codeine OTHER (SEE COMMENTS)     Cerner Allergy Text Annotation: codeine    Diatrizoate OTHER (SEE COMMENTS)    Gluten Meal OTHER (SEE COMMENTS)     Bloating/fullness    Iodine (Topical) OTHER (SEE COMMENTS)     Not sure if reacted to topical    Meperidine OTHER (SEE COMMENTS)     Cerner Allergy Text Annotation:  Demerol    Zinc-C-B6 OTHER (SEE COMMENTS)    Ciprofloxacin RASH    Ciprofloxacin Hcl RASH    Erythromycin RASH    Soybean Allergy NAUSEA ONLY    Sulfa Antibiotics RASH    Tetracaine RASH    Tetracycline RASH    Vicodin [Hydrocodone-Acetaminophen] NAUSEA ONLY and OTHER (SEE COMMENTS)     Feels \"drunk\"    Xylocaine [Lidocaine] RASH       SOCIAL HISTORY:    Social History     Socioeconomic History    Marital status:    Tobacco Use    Smoking status: Former     Packs/day: 0.50     Years: 1.00     Additional pack years: 0.00     Total pack years: 0.50     Types: Cigarettes     Quit date:      Years since quittin.2     Passive exposure: Never    Smokeless tobacco: Never    Tobacco comments:     Smoke in [employer] for a year to 2   Vaping Use    Vaping Use: Never used   Substance and Sexual Activity    Alcohol use: No    Drug use: No   Other Topics Concern    Caffeine Concern Yes     Comment: 1 cup daily       FAMILY HISTORY:   Family History   Problem Relation Age of Onset    No Known Problems Daughter     No Known Problems Son     Psychiatric Son     Colon Polyps Sister     Other (Other) Sister         Fibromyalgia    Hypertension Brother     Asthma Granddaughter          REVIEW OF SYSTEMS:  Ten point review of systems has been performed and is otherwise negative and/or non-contributory, except as described above.      PHYSICAL EXAM:   Vitals:    24 1003   BP: 120/88   BP Location: Right arm   Patient Position: Sitting   Cuff Size: adult   Pulse: 78   Resp: 16   SpO2: 98%   Weight: 102 lb (46.3 kg)   Height: 4' 10\" (1.473 m)     BMI: Body mass index is 21.32 kg/m².     CONSTITUTIONAL:  awake, alert, cooperative, no apparent distress, and appears stated age  PSYCH: normal affect  LUNGS: breathing comfortably  CARDIOVASCULAR:  regular rate       DATA:     Pertinent data reviewed      ASSESSMENT AND PLAN:    (E06.3) Hashimoto's disease  (primary encounter diagnosis)  Plan: Triiodothyronine (T3) Total,  TSH and Free T4  Bochemically subclinical hypoTH but has improved. Pt finally started Synthroid after initial outpatient visit and has felt better on thyroxine. She recently started a 'thyroid supplement' that she feels is also helping with her energy and wants to repeat TFTs again before amenable to dose incr. Due to upcoming imaging procedure, pt has also self-Dc'd LT4 x 1 week now..Pt is amenable to repeating TFTs in 4-6 weeks after consistent usage and if TSH still above goal, to incr to LT4 37.5mg daily  - for now, continue Synthroid 25mcg  - TFTs prior to next visit      Return to Clinic in 2-3 months        No orders of the defined types were placed in this encounter.            3/5/2024  Alejandro Serrano MD

## 2024-03-07 ENCOUNTER — HOSPITAL ENCOUNTER (OUTPATIENT)
Dept: GENERAL RADIOLOGY | Facility: HOSPITAL | Age: 79
Discharge: HOME | End: 2024-03-07
Attending: NURSE PRACTITIONER
Payer: MEDICARE

## 2024-03-07 ENCOUNTER — TELEPHONE (OUTPATIENT)
Dept: SURGERY | Facility: CLINIC | Age: 79
End: 2024-03-07

## 2024-03-07 ENCOUNTER — HOSPITAL ENCOUNTER (OUTPATIENT)
Dept: CT IMAGING | Facility: HOSPITAL | Age: 79
Discharge: HOME | End: 2024-03-07
Attending: NURSE PRACTITIONER
Payer: MEDICARE

## 2024-03-07 ENCOUNTER — NURSE ONLY (OUTPATIENT)
Dept: LAB | Facility: HOSPITAL | Age: 79
End: 2024-03-07
Attending: NURSE PRACTITIONER
Payer: MEDICARE

## 2024-03-07 DIAGNOSIS — G96.198 EPIDURAL MASS: Primary | ICD-10-CM

## 2024-03-07 DIAGNOSIS — G96.198 EPIDURAL MASS: ICD-10-CM

## 2024-03-07 LAB
ERYTHROCYTE [DISTWIDTH] IN BLOOD BY AUTOMATED COUNT: 13.5 %
HCT VFR BLD AUTO: 37.8 %
HGB BLD-MCNC: 13.1 G/DL
INR BLD: 0.94 (ref 0.8–1.2)
MCH RBC QN AUTO: 30.9 PG (ref 26–34)
MCHC RBC AUTO-ENTMCNC: 34.7 G/DL (ref 31–37)
MCV RBC AUTO: 89.2 FL
PLATELET # BLD AUTO: 221 10(3)UL (ref 150–450)
PROTHROMBIN TIME: 12.6 SECONDS (ref 11.6–14.8)
RBC # BLD AUTO: 4.24 X10(6)UL
WBC # BLD AUTO: 6.3 X10(3) UL (ref 4–11)

## 2024-03-07 PROCEDURE — 85610 PROTHROMBIN TIME: CPT | Performed by: NURSE PRACTITIONER

## 2024-03-07 PROCEDURE — 85027 COMPLETE CBC AUTOMATED: CPT | Performed by: NURSE PRACTITIONER

## 2024-03-07 RX ORDER — PREDNISONE 50 MG/1
TABLET ORAL
Qty: 3 TABLET | Refills: 0 | Status: SHIPPED | OUTPATIENT
Start: 2024-03-07 | End: 2024-03-07

## 2024-03-07 RX ORDER — PREDNISONE 50 MG/1
TABLET ORAL
Qty: 3 TABLET | Refills: 0 | Status: SHIPPED | OUTPATIENT
Start: 2024-03-07

## 2024-03-07 RX ORDER — PREDNISONE, DIPHENHYDRAMINE
1 KIT ONCE
Qty: 1 KIT | Refills: 0 | Status: SHIPPED | OUTPATIENT
Start: 2024-03-07 | End: 2024-03-07

## 2024-03-07 NOTE — TELEPHONE ENCOUNTER
Received call from [facility] pharmacy that states that they do not have   Prednisone and Diphenhydramine pack.   Pharmacy is requesting to separate the two medications because they do not have the combination pack.     Nursing acknowledged and routed to the ordering provider for review and feedback

## 2024-03-07 NOTE — TELEPHONE ENCOUNTER
New myelogram order placed with anesthesia.  I have sent a new rx for contrast allergy pre-medication to her pharmacy

## 2024-03-07 NOTE — IMAGING NOTE
Pt scheduled for myelogram today. Allergic to lidocaine/zylocaine and chloroprocaine. Spoke with pharmacy and there is no alternative. Dr. Suero updated and in to see pt to discuss plan of care. Dr. Suero is recommending anesthesia for myelogram. Called and left message for ordering provider, Ashley Anderson. Pt did take oral benadryl for contrast allergy.  Son unable to pick patient up. Will take uber home as patient took benadryl and she doesn't drive anymore.  Instructed to follow up with ordering provider for new order with anesthesia or possibly new plan of care.

## 2024-03-08 ENCOUNTER — TELEPHONE (OUTPATIENT)
Dept: SURGERY | Facility: CLINIC | Age: 79
End: 2024-03-08

## 2024-03-08 NOTE — TELEPHONE ENCOUNTER
Pt called requesting a call back from Ashley. Pt states she found out the name of the anesthesia she previously used due to allergic reaction. Pt is requesting a call back. Pt's best call back number is [phone]

## 2024-03-08 NOTE — TELEPHONE ENCOUNTER
Noted that patient has called asking to speak with SERGEY Henderson      At LOV with Ashley on 2.22.24:    \"IMAGING:  MRI lumbar reviewed w/ w/o from 1/19, shows epidural cystic mass extending from T12 to L2.       ASSESSMENT:  Epidural mass     PLAN:  Dr. Gaspar discussed with neuroIR.  CT myelogram ordered for further evaluation.  Contrast allergy medications ordered for iodine allergy  2.   F/u after myelogram for discussion.  Discussed possible IR drainage however pt will need myelogram to see if this is feasible\"    Routed to provider.

## 2024-03-11 NOTE — TELEPHONE ENCOUNTER
Called and spoke with patient.  She was able to find that she is able to find her drug sensitivities.  She will send a link to be forwarded to radiology

## 2024-03-13 ENCOUNTER — TELEPHONE (OUTPATIENT)
Dept: INTERNAL MEDICINE CLINIC | Facility: CLINIC | Age: 79
End: 2024-03-13

## 2024-03-13 NOTE — TELEPHONE ENCOUNTER
Rita, nurse from [facility], stated pt is being discharged from  today.  Rita wanted to notify that pt is has been having trembling in her mouth and jaw area and would like us to f/u with pt on this.

## 2024-03-14 NOTE — TELEPHONE ENCOUNTER
Patient called to report lip \"trembling\" at night for past 1-2 months.  Patient states the sensation typically subsides or she falls back asleep.  She denies any pain but does states she has been dropping things more lately.  She is in a wheelchair due to her back and her mobility is limited.  She denies excessive caffeine intake, stress, she states she tries to drink water but could drink more, does not always sleep well.  Advised patient that dehydration and inadequate sleep can cause muscle twitching.  Due to dropping things lately, appointment scheduled for further evaluation.

## 2024-03-15 ENCOUNTER — TELEPHONE (OUTPATIENT)
Dept: INTERNAL MEDICINE CLINIC | Facility: CLINIC | Age: 79
End: 2024-03-15

## 2024-03-15 NOTE — TELEPHONE ENCOUNTER
Discharge summary received from [facility], placed on A.D. desk for review.

## 2024-03-20 ENCOUNTER — HOSPITAL ENCOUNTER (OUTPATIENT)
Dept: MRI IMAGING | Age: 79
Discharge: HOME | End: 2024-03-20
Payer: MEDICARE

## 2024-03-20 ENCOUNTER — HOSPITAL ENCOUNTER (OUTPATIENT)
Dept: MRI IMAGING | Age: 79
Discharge: HOME | End: 2024-03-20
Attending: INTERNAL MEDICINE
Payer: MEDICARE

## 2024-03-20 DIAGNOSIS — R51.9 CHRONIC NONINTRACTABLE HEADACHE, UNSPECIFIED HEADACHE TYPE: ICD-10-CM

## 2024-03-20 DIAGNOSIS — G89.29 CHRONIC NONINTRACTABLE HEADACHE, UNSPECIFIED HEADACHE TYPE: ICD-10-CM

## 2024-03-20 DIAGNOSIS — G96.198 EXTRADURAL CYST OF SPINE: ICD-10-CM

## 2024-03-20 PROCEDURE — A9575 INJ GADOTERATE MEGLUMI 0.1ML: HCPCS

## 2024-03-20 PROCEDURE — 70551 MRI BRAIN STEM W/O DYE: CPT | Performed by: INTERNAL MEDICINE

## 2024-03-20 PROCEDURE — 72158 MRI LUMBAR SPINE W/O & W/DYE: CPT

## 2024-03-20 RX ORDER — DIPHENHYDRAMINE HYDROCHLORIDE 50 MG/ML
10 INJECTION, SOLUTION INTRAMUSCULAR; INTRAVENOUS
Status: COMPLETED | OUTPATIENT
Start: 2024-03-20 | End: 2024-03-20

## 2024-03-20 RX ADMIN — DIPHENHYDRAMINE HYDROCHLORIDE 10 ML: 50 INJECTION, SOLUTION INTRAMUSCULAR; INTRAVENOUS at 11:00:00

## 2024-04-01 ENCOUNTER — OFFICE VISIT (OUTPATIENT)
Dept: INTERNAL MEDICINE CLINIC | Facility: CLINIC | Age: 79
End: 2024-04-01
Payer: MEDICARE

## 2024-04-01 VITALS
SYSTOLIC BLOOD PRESSURE: 118 MMHG | TEMPERATURE: 97 F | WEIGHT: 106 LBS | RESPIRATION RATE: 18 BRPM | OXYGEN SATURATION: 97 % | HEIGHT: 58 IN | BODY MASS INDEX: 22.25 KG/M2 | HEART RATE: 88 BPM | DIASTOLIC BLOOD PRESSURE: 78 MMHG

## 2024-04-01 DIAGNOSIS — M79.7 FIBROMYALGIA: Primary | ICD-10-CM

## 2024-04-01 DIAGNOSIS — R21 RASH AND NONSPECIFIC SKIN ERUPTION: ICD-10-CM

## 2024-04-01 DIAGNOSIS — R25.1 LIP TREMOR: ICD-10-CM

## 2024-04-01 DIAGNOSIS — R20.0 LIP NUMBNESS: ICD-10-CM

## 2024-04-01 PROCEDURE — 3078F DIAST BP <80 MM HG: CPT | Performed by: INTERNAL MEDICINE

## 2024-04-01 PROCEDURE — 1160F RVW MEDS BY RX/DR IN RCRD: CPT | Performed by: INTERNAL MEDICINE

## 2024-04-01 PROCEDURE — 1159F MED LIST DOCD IN RCRD: CPT | Performed by: INTERNAL MEDICINE

## 2024-04-01 PROCEDURE — 3008F BODY MASS INDEX DOCD: CPT | Performed by: INTERNAL MEDICINE

## 2024-04-01 PROCEDURE — 99214 OFFICE O/P EST MOD 30 MIN: CPT | Performed by: INTERNAL MEDICINE

## 2024-04-01 PROCEDURE — 3074F SYST BP LT 130 MM HG: CPT | Performed by: INTERNAL MEDICINE

## 2024-04-01 NOTE — PROGRESS NOTES
Arianna Colin  1/25/1945    Chief Complaint   Patient presents with    Lab Results     TA RM 12    Other     Allergic reaction     SUBJECTIVE   Arianna Colin is a 79 year old female who presents for evaluation of recurring rash and lip trembling.    She may have taken additional doses of supplements including Vitamin D, Krill Oil, Gallbladder, Zinc. Shortly after she developed macular rash on arms, chest and back as well as rhinorrhea. Denies shortness of breath, fevers, chills. She has had a similar rash with no correlation to medications, foods, etc. Was previously referred to Allergy/Immunology but has not scheduled the appointment yet.    She drank a glass of water and the rash started to improved. Described as pruritic and burning.    Intermittent episodes of lip trembling and tingling and tinnitus. She had tinnitus in the past that went away but now is back.    Tender point located inferior to clavicle, medial to shoulder. She was referred to acupuncture for fibromyalgia but has not scheduled yet.    Review of Systems   Review of Systems   No f/c/chest pain or sob. No cough. No abd pain/n/v/d. No ha or dizziness.    OBJECTIVE:   /78   Pulse 88   Temp 97.4 °F (36.3 °C)   Resp 18   Ht 4' 10\" (1.473 m)   Wt 106 lb (48.1 kg)   SpO2 97%   BMI 22.15 kg/m²   Physical Exam   Constitutional: Oriented to person, place, and time. No distress.   HEENT:  Normocephalic and atraumatic. Nose normal. Oropharynx is clear and moist. Per patient lip was trembling but no movement was visible.  Cardiovascular: Normal rate, regular rhythm and intact distal pulses.  No murmur, rubs or gallops.   Pulmonary/Chest: Effort normal and breath sounds normal. No respiratory distress.  Skin: Erythematous macular rash on bilateral forearms, abdomen and back. No facial or mucous membrane involvement.  Musculoskeletal: Fibromyalgia tender point left anterior shoulder.    Lab Results   Component Value Date    GLU 88 03/01/2024     BUN 18 03/01/2024    CREATSERUM 0.69 03/01/2024    BUNCREA NOT APPLICABLE 09/09/2021    ANIONGAP 2 03/01/2024    GFRAA 99 06/20/2022    GFRNAA 86 06/20/2022    CA 9.3 03/01/2024     03/01/2024    K 4.0 03/01/2024     03/01/2024    CO2 29.0 03/01/2024    OSMOCALC 289 03/01/2024      Lab Results   Component Value Date    WBC 6.3 03/07/2024    RBC 4.24 03/07/2024    HGB 13.1 03/07/2024    HCT 37.8 03/07/2024    MCV 89.2 03/07/2024    MCH 30.9 03/07/2024    MCHC 34.7 03/07/2024    RDW 13.5 03/07/2024    .0 03/07/2024      Lab Results   Component Value Date    T4F 1.0 03/01/2024    TSH 4.650 (H) 03/01/2024    TSHT4 6.17 (H) 01/05/2023        ASSESSMENT AND PLAN:       ICD-10-CM    1. Fibromyalgia  M79.7 Previously referred for acupuncture which I think will benefit the patient significantly.        2. Rash and nonspecific skin eruption  R21 Previously referred to Allergy/Immunology. Rash is visible today and per patient improved already. No mucus membrane involvement. She is hemodynamically stable. Declines Rx steroid. Will take a Benadryl. Has EpiPen.      3. Lip numbness  R20.0 Vitamin B12 [E]     Magnesium [E]      4. Lip tremor  R25.1 Vitamin B12 [E]     Magnesium [E]   For 3-4 there is no visible movement of the lip today even when patient felt there was. 3/1 Ca and K normal.TSH is mildly elevated at 4.650 but T4 is 1.0.  Otherwise no focal neurological deficits. Will check Vitamin B12, Magnesium.        The patient indicates understanding of these issues and agrees to the plan.  The patient is asked to return or present to the emergency room for worsening of symptoms.    TODAY'S ORDERS     No orders of the defined types were placed in this encounter.      Meds & Refills:  Requested Prescriptions      No prescriptions requested or ordered in this encounter       Imaging & Consults:  None    No follow-ups on file.  There are no Patient Instructions on file for this visit.    All questions were  answered and the patient agrees with the plan.     Thank you,  Francis Taylor, DO

## 2024-04-08 ENCOUNTER — TELEPHONE (OUTPATIENT)
Dept: SURGERY | Facility: CLINIC | Age: 79
End: 2024-04-08

## 2024-04-10 ENCOUNTER — TELEPHONE (OUTPATIENT)
Dept: INTERNAL MEDICINE CLINIC | Facility: CLINIC | Age: 79
End: 2024-04-10

## 2024-04-10 DIAGNOSIS — G96.198 EPIDURAL MASS: Primary | ICD-10-CM

## 2024-04-10 NOTE — TELEPHONE ENCOUNTER
Per [employer], this patient is an active on [employer] with recent login listed. This information was sent to the patient via [employer] message.

## 2024-04-10 NOTE — TELEPHONE ENCOUNTER
Pt stated she needs a referral to see Dr. Andrew Clayton for a cyst on her back. Pt stated MP has treated her for this and is aware.    Specialty:  Neurology    Full Name of Specialist: Dr. Andrew Clayton    Name of the Provider Group: [facility]  Address:  Phone number: [phone]  Fax number :  NPI:    (NPI number of the service provider.  the nurses need this information for the referral.  Its for service providers only like Surgery*, [employer], [facility], Etc.  We not NOT need physician NPI's)    *Surgery:The NPI # should be of the location of the Surgery.    Date of Appointment: can't make an appt without referral    Reason for the Appointment (be specific with diagnosis code):  Cyst on her back    Has the patient seen a provider in our office for stated problem?: yes    Is this request for an out of network referral?   (if yes, please have patient contact referring provider and have them fax office visit notes to triage attention):   yes

## 2024-04-14 NOTE — TELEPHONE ENCOUNTER
Patient was seen on [facility] Neck ,(R)sholder,hand  And was referred to Arnaldo Lal,please review and sign plan and care if you agree Thank you. Omaira Kennedy V      [facility] REFERRALS. No

## 2024-04-23 ENCOUNTER — TELEPHONE (OUTPATIENT)
Dept: INTERNAL MEDICINE CLINIC | Facility: CLINIC | Age: 79
End: 2024-04-23

## 2024-04-23 DIAGNOSIS — T78.40XA ALLERGY, INITIAL ENCOUNTER: Primary | ICD-10-CM

## 2024-04-23 NOTE — TELEPHONE ENCOUNTER
Specialty: Allergist     Full Name of Specialist: Indu Lopez    Name of the Provider Group:  Address: [address] [place] [address]   Phone number: [phone]  Fax number : [phone]  NPI:    (NPI number of the service provider.  the nurses need this information for the referral.  Its for service providers only like Surgery*, [employer], [facility], Etc.  We not NOT need physician NPI's)    *Surgery:The NPI # should be of the location of the Surgery.    Date of Appointment: 4/30/24    Reason for the Appointment (be specific with diagnosis code): Allergies     Has the patient seen a provider in our office for stated problem?: yes     Is this request for an out of network referral? No   (if yes, please have patient contact referring provider and have them fax office visit notes to triage attention):      Message patient once referral is placed via Fwd: Power.

## 2024-04-30 ENCOUNTER — OFFICE VISIT (OUTPATIENT)
Dept: SURGERY | Facility: CLINIC | Age: 79
End: 2024-04-30
Payer: MEDICARE

## 2024-04-30 VITALS
WEIGHT: 100 LBS | HEIGHT: 59 IN | SYSTOLIC BLOOD PRESSURE: 118 MMHG | HEART RATE: 72 BPM | BODY MASS INDEX: 20.16 KG/M2 | DIASTOLIC BLOOD PRESSURE: 60 MMHG

## 2024-04-30 DIAGNOSIS — G96.198 EPIDURAL MASS: Primary | ICD-10-CM

## 2024-04-30 DIAGNOSIS — M54.6 ACUTE MIDLINE THORACIC BACK PAIN: ICD-10-CM

## 2024-04-30 PROCEDURE — 3074F SYST BP LT 130 MM HG: CPT | Performed by: NURSE PRACTITIONER

## 2024-04-30 PROCEDURE — 3078F DIAST BP <80 MM HG: CPT | Performed by: NURSE PRACTITIONER

## 2024-04-30 PROCEDURE — 1159F MED LIST DOCD IN RCRD: CPT | Performed by: NURSE PRACTITIONER

## 2024-04-30 PROCEDURE — 99213 OFFICE O/P EST LOW 20 MIN: CPT | Performed by: NURSE PRACTITIONER

## 2024-04-30 PROCEDURE — 3008F BODY MASS INDEX DOCD: CPT | Performed by: NURSE PRACTITIONER

## 2024-04-30 NOTE — PROGRESS NOTES
Pt here for a follow up on her Epidural mass. And review her imaging.    Pt states she is doing she better, states she still really tired and she still gets dizzy.     Imaging in chart
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
## 2024-05-02 ENCOUNTER — TELEPHONE (OUTPATIENT)
Dept: INTERNAL MEDICINE CLINIC | Facility: CLINIC | Age: 79
End: 2024-05-02

## 2024-05-02 DIAGNOSIS — G96.198 EPIDURAL MASS: ICD-10-CM

## 2024-05-02 DIAGNOSIS — M19.042 PRIMARY OSTEOARTHRITIS OF BOTH HANDS: ICD-10-CM

## 2024-05-02 DIAGNOSIS — M79.7 FIBROMYALGIA: Primary | ICD-10-CM

## 2024-05-02 DIAGNOSIS — M54.6 ACUTE MIDLINE THORACIC BACK PAIN: ICD-10-CM

## 2024-05-02 DIAGNOSIS — M25.50 POLYARTHRALGIA: ICD-10-CM

## 2024-05-02 DIAGNOSIS — M51.34 THORACIC DEGENERATIVE DISC DISEASE: ICD-10-CM

## 2024-05-02 DIAGNOSIS — M51.37 DEGENERATIVE DISC DISEASE AT L5-S1 LEVEL: ICD-10-CM

## 2024-05-02 DIAGNOSIS — M50.30 DDD (DEGENERATIVE DISC DISEASE), CERVICAL: ICD-10-CM

## 2024-05-02 DIAGNOSIS — M16.0 PRIMARY OSTEOARTHRITIS OF BOTH HIPS: ICD-10-CM

## 2024-05-02 DIAGNOSIS — M19.041 PRIMARY OSTEOARTHRITIS OF BOTH HANDS: ICD-10-CM

## 2024-05-02 DIAGNOSIS — G31.84 MCI (MILD COGNITIVE IMPAIRMENT): ICD-10-CM

## 2024-05-02 NOTE — TELEPHONE ENCOUNTER
Pt wanting to get a motorized scooter to help her in and outside of the house. What is the process to see if this is an option with her insurance? She did have [employer] phone number but not sure if this is the right place to contact. [phone]

## 2024-05-06 ENCOUNTER — LAB ENCOUNTER (OUTPATIENT)
Dept: LAB | Facility: HOSPITAL | Age: 79
End: 2024-05-06
Attending: STUDENT IN AN ORGANIZED HEALTH CARE EDUCATION/TRAINING PROGRAM
Payer: MEDICARE

## 2024-05-06 DIAGNOSIS — J45.40 MODERATE PERSISTENT ASTHMA WITHOUT COMPLICATION (HCC): ICD-10-CM

## 2024-05-06 LAB
MAGNESIUM SERPL-MCNC: 2.4 MG/DL (ref 1.6–2.6)
T3 SERPL-MCNC: 77 NG/DL (ref 60–181)
T4 FREE SERPL-MCNC: 0.9 NG/DL (ref 0.8–1.7)
TSI SER-ACNC: 7.49 MIU/ML (ref 0.36–3.74)
VIT B12 SERPL-MCNC: 444 PG/ML (ref 193–986)

## 2024-05-06 PROCEDURE — 84439 ASSAY OF FREE THYROXINE: CPT | Performed by: STUDENT IN AN ORGANIZED HEALTH CARE EDUCATION/TRAINING PROGRAM

## 2024-05-06 PROCEDURE — 84443 ASSAY THYROID STIM HORMONE: CPT | Performed by: STUDENT IN AN ORGANIZED HEALTH CARE EDUCATION/TRAINING PROGRAM

## 2024-05-06 PROCEDURE — 82607 VITAMIN B-12: CPT | Performed by: INTERNAL MEDICINE

## 2024-05-06 PROCEDURE — 84480 ASSAY TRIIODOTHYRONINE (T3): CPT | Performed by: STUDENT IN AN ORGANIZED HEALTH CARE EDUCATION/TRAINING PROGRAM

## 2024-05-06 PROCEDURE — 83735 ASSAY OF MAGNESIUM: CPT | Performed by: STUDENT IN AN ORGANIZED HEALTH CARE EDUCATION/TRAINING PROGRAM

## 2024-05-06 PROCEDURE — 36415 COLL VENOUS BLD VENIPUNCTURE: CPT | Performed by: STUDENT IN AN ORGANIZED HEALTH CARE EDUCATION/TRAINING PROGRAM

## 2024-05-06 RX ORDER — ALBUTEROL SULFATE 90 UG/1
2 AEROSOL, METERED RESPIRATORY (INHALATION) EVERY 6 HOURS PRN
Refills: 0 | Status: CANCELLED | OUTPATIENT
Start: 2024-05-06

## 2024-05-06 NOTE — TELEPHONE ENCOUNTER
Arianna Colin requesting Medication Refill for:  Albuterol sulfate FHA 90 MCG/actuation aerosol inhaler    LOV: 3/4/2024   Last Refill date: N/A  Pharmacy: [facility] mail order  Next Scheduled appointment:  N/A

## 2024-05-07 ENCOUNTER — OFFICE VISIT (OUTPATIENT)
Facility: CLINIC | Age: 79
End: 2024-05-07
Payer: MEDICARE

## 2024-05-07 ENCOUNTER — PATIENT OUTREACH (OUTPATIENT)
Dept: CASE MANAGEMENT | Age: 79
End: 2024-05-07

## 2024-05-07 VITALS
DIASTOLIC BLOOD PRESSURE: 62 MMHG | HEART RATE: 71 BPM | WEIGHT: 100 LBS | BODY MASS INDEX: 20.16 KG/M2 | OXYGEN SATURATION: 97 % | SYSTOLIC BLOOD PRESSURE: 110 MMHG | HEIGHT: 59 IN

## 2024-05-07 DIAGNOSIS — E06.3 HASHIMOTO'S DISEASE: Primary | ICD-10-CM

## 2024-05-07 PROCEDURE — 3074F SYST BP LT 130 MM HG: CPT | Performed by: STUDENT IN AN ORGANIZED HEALTH CARE EDUCATION/TRAINING PROGRAM

## 2024-05-07 PROCEDURE — 1159F MED LIST DOCD IN RCRD: CPT | Performed by: STUDENT IN AN ORGANIZED HEALTH CARE EDUCATION/TRAINING PROGRAM

## 2024-05-07 PROCEDURE — 3008F BODY MASS INDEX DOCD: CPT | Performed by: STUDENT IN AN ORGANIZED HEALTH CARE EDUCATION/TRAINING PROGRAM

## 2024-05-07 PROCEDURE — 99213 OFFICE O/P EST LOW 20 MIN: CPT | Performed by: STUDENT IN AN ORGANIZED HEALTH CARE EDUCATION/TRAINING PROGRAM

## 2024-05-07 PROCEDURE — 3078F DIAST BP <80 MM HG: CPT | Performed by: STUDENT IN AN ORGANIZED HEALTH CARE EDUCATION/TRAINING PROGRAM

## 2024-05-07 PROCEDURE — 1160F RVW MEDS BY RX/DR IN RCRD: CPT | Performed by: STUDENT IN AN ORGANIZED HEALTH CARE EDUCATION/TRAINING PROGRAM

## 2024-05-07 RX ORDER — LEVOTHYROXINE SODIUM 25 MCG
TABLET ORAL
Qty: 90 TABLET | Refills: 1 | Status: SHIPPED | OUTPATIENT
Start: 2024-05-07

## 2024-05-07 RX ORDER — ALBUTEROL SULFATE 90 UG/1
2 AEROSOL, METERED RESPIRATORY (INHALATION) EVERY 6 HOURS PRN
Qty: 1 EACH | Refills: 1 | Status: SHIPPED | OUTPATIENT
Start: 2024-05-07

## 2024-05-07 NOTE — PATIENT INSTRUCTIONS
Return Visit   [x  ] Physician in 6 months   [  ] In person or video visit  [  ] In person only    [ x ] After visit summary   [ x ] Placed labs/imaging.      It was great seeing you today!    Today we discussed your thyroid:  Take 1 tablet of 25 mcg on Monday-Friday  Take 2 tablets of 25 mcg (total 50 mcg) on Saturday and Sunday   Repeat your labs in 6 weeks (6/10 onwards)    Take care!  -Dr. Sow

## 2024-05-07 NOTE — TELEPHONE ENCOUNTER
Please review. Rx failed/no protocol.    Requested Prescriptions   Pending Prescriptions Disp Refills    albuterol 108 (90 Base) MCG/ACT Inhalation Aero Soln 1 each 0     Sig: Inhale 2 puffs into the lungs every 6 (six) hours as needed for Wheezing.       Asthma & COPD Medication Protocol Failed - 5/7/2024  3:12 PM        Failed - Asthma Action Score greater than or equal to 20        Failed - AAP/ACT given in last 12 months     No data recorded  No data recorded  No data recorded  No data recorded          Passed - Appointment in past 6 or next 3 months      Recent Outpatient Visits              Today Hashimoto's disease    [facility] Kathi Sow,     Office Visit    1 week ago Epidural mass    [facility] Ashley Anderson APN    Office Visit    1 month ago Fibromyalgia    [address] Francis Taylor,     Office Visit    2 months ago     [facility]    Nurse Only    2 months ago Hashimoto's disease    [facility] Alejandro Serrano MD    Office Visit          Future Appointments         Provider Department Appt Notes    In 1 month [facility]     In 1 month  RADIOLOGIST SPEC IR XR;  XR RM2 (MISC FLUOROSCOPY) [facility] X-ray     In 1 month PERIOP RADIOLOGY RM;  CT MAIN RM3 [facility] CT     In 6 months Kathi Sow DO [facility] **Dr. Serrano patient**  6 mo f/u- Hashimoto's  LOV 5/7/24 w/ Juanjose                         Future Appointments         Provider Department Appt Notes    In 1 month [facility]     In 1 month  RADIOLOGIST SPEC IR XR; EH XR RM2 (MISC FLUOROSCOPY) [facility] X-ray     In 1 month  PERIOP RADIOLOGY RM; EH CT MAIN RM3 [facility] CT     In 6 months Kathi Sow DO [facility] **Dr. Serrano patient**  6 mo f/u- Hashimoto's  LOV 5/7/24 w/ Juanjose          Recent Outpatient Visits              Today Hashimoto's disease    [facility] Kathi Sow DO    Office Visit    1 week ago Epidural mass    [facility] Ashley Anderson APN    Office Visit    1 month ago Fibromyalgia    [facility], [address] [place] [address] Francis Taylor DO    Office Visit    2 months ago     [facility]    Nurse Only    2 months ago Hashimoto's disease    [facility] Alejandro Serrano MD    Office Visit

## 2024-05-07 NOTE — TELEPHONE ENCOUNTER
Called [employer] and verified they provide motorized scooters and that they accept patient's insurance.  Order, Demographics and recent OV note faxed to Intake at [employer] [facility] at [phone]. Confirmation fax received.

## 2024-05-07 NOTE — PROGRESS NOTES
Endocrinology Clinic Note    Name: Arianna Colin    Date: 2024       HISTORY OF PRESENT ILLNESS   Arianna Colin is a 79 year old female with PMHx significant for astham, fibromyalgia, Hashimoto's, osteoporosis who presents for endocrine consultation for thyroid management.    Initial HPI consult in Oct 2023  Pt was seen by the endo service in 2023 when she was hospitalized for syncopal/seizure activity. Endocrine consulted for subclinical hypoTH, due to pt having stopped LT4 25mcg several months ago. TFTs show mild subclinical hypoTH, which may be partially contributing to her fatigue and constipation. Discussed with pt that only generic LT4 is available in the hospital and she can trial Synthroid in the outpatient setting. Pt was amenable to restarting LT4 25mcg.     2023 - fT4 0.9, TSH 11.9 -- started LT4 25mcg while in the hospital; pt did not  Rx after DC, instead has resumed a thyroid preparation from her holistic provider  2023 - fT4 0.8, TSH 4.3     Had some constipation earlier that has resolved. Is trying to arrange for endoscopy.    Interim hx:  2024  3/2024 - fT4 1.0, TSH 4.6, TT3 89 -- been on Synthroid 25mcg since last visit   Recently just started a supplement that 'supports thyroid' that contains tumeric, feels better  Has myelogram on Thurs and pt has temporarily stopped her meds x 1 week    May 2024   Stopped taking lt4 since she didn't have any refills, last took it 5/4  Currently on synthroid 25 mcg   Does note shortness of breath from asthma so unsure if her symptoms are from thyroid or something else   Does endorse fatigue. Denies constipation since she is intermittnetly on \"super cleanse\" supplement. Does endorse weight gain of 3 pounds over 1 month. Does endorse chills/cold intolerance.     PAST MEDICAL HISTORY:   Reviewed    PAST SURGICAL HISTORY:   Past Surgical History:   Procedure Laterality Date      ,    Cholecystectomy      Colonoscopy       Egd      Hysterectomy      Total abdom hysterectomy  1977       CURRENT MEDICATIONS:    Current Outpatient Medications   Medication Sig Dispense Refill    PATIENT SUPPLIED MEDICATION daily. Gallbladder formula, super colon cleanse      albuterol (VENTOLIN HFA) 108 (90 Base) MCG/ACT Inhalation Aero Soln Inhale 2 puffs into the lungs every 6 (six) hours as needed.      fluticasone furoate-vilanterol (BREO ELLIPTA) 100-25 MCG/ACT Inhalation Aerosol Powder, Breath Activated Inhale 1 puff into the lungs daily. (Patient taking differently: Inhale 1 puff into the lungs as needed.) 1 each 5    SYNTHROID 25 MCG Oral Tab Take 1 tablet (25 mcg total) by mouth before breakfast. 90 tablet 0    Cholecalciferol (VITAMIN D) 50 MCG (2000 UT) Oral Cap Take by mouth.      Turmeric (QC TUMERIC COMPLEX OR) Take by mouth.      vitamin B-12 50 MCG Oral Tab Take 1 tablet (50 mcg total) by mouth daily.      NON FORMULARY quersetin      Respiratory Therapy Supplies (NEBULIZER/TUBING/MOUTHPIECE) Does not apply Kit Use as directed. 1 each 0    COLLAGEN OR Take by mouth.      Omega-3 Fatty Acids (OMEGA 3 500) 500 MG Oral Cap Take 1 capsule by mouth daily. 625 mg      Ascorbic Acid (VITAMIN C) 100 MG Oral Tab Take 1 tablet (100 mg total) by mouth daily.      Calcium-Magnesium (GABRIELLA-MAG OR) Take 600 mg by mouth in the morning and 600 mg before bedtime.       Endocrine Medications            SYNTHROID 25 MCG Oral Tab            ALLERGIES:  Allergies   Allergen Reactions    Carbamazepine OTHER (SEE COMMENTS) and SWELLING     Facial swelling      Clonazepam OTHER (SEE COMMENTS)     Hallucinations  Hallucination  hallucinations      Other SHORTNESS OF BREATH, OTHER (SEE COMMENTS), RASH, FATIGUE and PAIN     Cerner Allergy Text Annotation: Contrast Dye    Pqq sepra    Soy Allergy HIVES    Isoflavones NAUSEA AND VOMITING    Codeine OTHER (SEE COMMENTS)     Cerner Allergy Text Annotation: codeine    Diatrizoate OTHER (SEE COMMENTS)    Gluten Meal OTHER  (SEE COMMENTS)     Bloating/fullness    Iodine (Topical) OTHER (SEE COMMENTS)     Not sure if reacted to topical    Meperidine OTHER (SEE COMMENTS)     Cerner Allergy Text Annotation: Demerol    Zinc-C-B6 OTHER (SEE COMMENTS)    Ciprofloxacin RASH    Ciprofloxacin Hcl RASH    Erythromycin RASH    Soybean Allergy NAUSEA ONLY    Sulfa Antibiotics RASH    Tetracaine RASH    Tetracycline RASH    Vicodin [Hydrocodone-Acetaminophen] NAUSEA ONLY and OTHER (SEE COMMENTS)     Feels \"drunk\"    Xylocaine [Lidocaine] RASH       SOCIAL HISTORY:    Social History     Socioeconomic History    Marital status:    Tobacco Use    Smoking status: Former     Current packs/day: 0.00     Average packs/day: 0.5 packs/day for 1 year (0.5 ttl pk-yrs)     Types: Cigarettes     Start date:      Quit date:      Years since quittin.3     Passive exposure: Never    Smokeless tobacco: Never    Tobacco comments:     Smoke in college for a year to 2   Vaping Use    Vaping status: Never Used   Substance and Sexual Activity    Alcohol use: No    Drug use: No   Other Topics Concern    Caffeine Concern Yes     Comment: 1 cup daily       FAMILY HISTORY:   Family History   Problem Relation Age of Onset    No Known Problems Daughter     No Known Problems Son     Psychiatric Son     Colon Polyps Sister     Other (Other) Sister         Fibromyalgia    Hypertension Brother     Asthma Granddaughter          REVIEW OF SYSTEMS:  Ten point review of systems has been performed and is otherwise negative and/or non-contributory, except as described above.      PHYSICAL EXAM:   Vitals:    24 1017   BP: 110/62   Pulse: 71   SpO2: 97%   Weight: 100 lb (45.4 kg)   Height: 4' 11\" (1.499 m)     BMI: Body mass index is 20.2 kg/m².     CONSTITUTIONAL:  awake, alert, cooperative, no apparent distress, and appears stated age  PSYCH: normal affect  LUNGS: breathing comfortably  CARDIOVASCULAR:  regular rate     262  175    225    DATA:     Pertinent  data reviewed      ASSESSMENT AND PLAN:    (E06.3) Hashimoto's disease  (primary encounter diagnosis)  Plan: Triiodothyronine (T3) Total, TSH and Free T4  Bochemically subclinical hypoTH but has improved. Pt finally started Synthroid after initial outpatient visit and has felt better on thyroxine. 2/2024 started a 'thyroid supplement' that she feels is also helping with her energy. Pt stopped LT4 for 2-3 days since she ran out. Pt states she was otherwise taking the medication. Pt is amenable to incr LT4 since TSH above goal  - Prescribed Synthroid 25mcg M-F and 50mcg Sa/Marcano with repeat tfts in 6 weeks  - Added alarm to her phone as a reminder to take her medications  - Added a calendar reminder for her follow up labs   - TFTs prior to next visit      Return to Clinic in 6 months       Kathi Sow DO  Endocrinology, Diabetes & Metabolism   5/7/2024

## 2024-05-09 ENCOUNTER — APPOINTMENT (OUTPATIENT)
Dept: GENERAL RADIOLOGY | Age: 79
End: 2024-05-09
Attending: NURSE PRACTITIONER
Payer: MEDICARE

## 2024-05-09 ENCOUNTER — APPOINTMENT (OUTPATIENT)
Dept: ULTRASOUND IMAGING | Age: 79
End: 2024-05-09
Attending: NURSE PRACTITIONER
Payer: MEDICARE

## 2024-05-09 ENCOUNTER — HOSPITAL ENCOUNTER (EMERGENCY)
Age: 79
Discharge: HOME | End: 2024-05-09
Payer: MEDICARE

## 2024-05-09 ENCOUNTER — NURSE TRIAGE (OUTPATIENT)
Dept: FAMILY MEDICINE CLINIC | Facility: CLINIC | Age: 79
End: 2024-05-09

## 2024-05-09 VITALS
SYSTOLIC BLOOD PRESSURE: 112 MMHG | HEIGHT: 57 IN | OXYGEN SATURATION: 99 % | RESPIRATION RATE: 16 BRPM | DIASTOLIC BLOOD PRESSURE: 60 MMHG | HEART RATE: 66 BPM | BODY MASS INDEX: 22.87 KG/M2 | WEIGHT: 106 LBS | TEMPERATURE: 98 F

## 2024-05-09 DIAGNOSIS — I83.93 SPIDER VEINS OF BOTH LOWER EXTREMITIES: ICD-10-CM

## 2024-05-09 DIAGNOSIS — M54.17 LUMBOSACRAL RADICULOPATHY: Primary | ICD-10-CM

## 2024-05-09 LAB — SARS-COV-2 RNA RESP QL NAA+PROBE: NOT DETECTED

## 2024-05-09 PROCEDURE — 72100 X-RAY EXAM L-S SPINE 2/3 VWS: CPT | Performed by: NURSE PRACTITIONER

## 2024-05-09 PROCEDURE — 93970 EXTREMITY STUDY: CPT | Performed by: NURSE PRACTITIONER

## 2024-05-09 PROCEDURE — 99284 EMERGENCY DEPT VISIT MOD MDM: CPT

## 2024-05-09 RX ORDER — NAPROXEN 500 MG/1
500 TABLET ORAL 2 TIMES DAILY PRN
Qty: 20 TABLET | Refills: 0 | Status: SHIPPED | OUTPATIENT
Start: 2024-05-09 | End: 2024-05-16
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
## 2024-05-09 NOTE — ED INITIAL ASSESSMENT (HPI)
States several days with pain in left lower back down leg. Yesterday while walking pain is worse and now pain in front of leg and down to her knee. No fall or injury. States she cannot ambulate without assistance. States took uber to the ED.

## 2024-05-09 NOTE — TELEPHONE ENCOUNTER
Action Requested: Summary for Provider     []  Critical Lab, Recommendations Needed  [] Need Additional Advice  []   FYI    []   Need Orders  [] Need Medications Sent to Pharmacy  []  Other     SUMMARY: left leg pain  Pt called bilateral leg pain, left leg worsening. Blue discoloration. Severe pain after activity. Radiating back pain and SOB. No swelling. No CP. No fever.  Pt leaving to [place] on Saturday.   Advised pt to go to ER to r/o DVT, especially before traveling. Pt agrees, will go to [facility].   Pt will f/u if any other concerns. No further questions. Pt verbalized understanding and agreed with POC.      Reason for Disposition   Difficulty breathing    Answer Assessment - Initial Assessment Questions  1. ONSET: \"When did the pain start?\"       Unsure, noted getting worse  2. LOCATION: \"Where is the pain located?\"       Both legs, left leg worse  3. PAIN: \"How bad is the pain?\"    (Scale 1-10; or mild, moderate, severe)    -  MILD (1-3): doesn't interfere with normal activities     -  MODERATE (4-7): interferes with normal activities (e.g., work or school) or awakens from sleep, limping     -  SEVERE (8-10): excruciating pain, unable to do any normal activities, unable to walk      After activity, rates 8/10  4. WORK OR EXERCISE: \"Has there been any recent work or exercise that involved this part of the body?\"       walking  5. CAUSE: \"What do you think is causing the leg pain?\"      Unknown   6. OTHER SYMPTOMS: \"Do you have any other symptoms?\" (e.g., chest pain, back pain, breathing difficulty, swelling, rash, fever, numbness, weakness)      Back pain, SOB  7. PREGNANCY: \"Is there any chance you are pregnant?\" \"When was your last menstrual period?\"      no    Protocols used: Leg Pain-A-[place]

## 2024-05-13 ENCOUNTER — PATIENT OUTREACH (OUTPATIENT)
Dept: CASE MANAGEMENT | Age: 79
End: 2024-05-13

## 2024-05-13 ENCOUNTER — TELEPHONE (OUTPATIENT)
Dept: FAMILY MEDICINE CLINIC | Facility: CLINIC | Age: 79
End: 2024-05-13

## 2024-05-13 NOTE — PROGRESS NOTES
Patient identified with a potential need for Chronic Care Management services.  Called patient to introduce self and availability of Chronic Care Management services.  Patient informed about the following service elements:  Health information sharing - complying with all laws related to privacy and security of their health information  Patient/Insurance Cost sharing - includes deductible and any ongoing copays and/or coinsurance  Only one provider can bill for this service monthly  Patient right to discontinue services at any time for any reason effective last day of current month  Patient verbally Accepted to participate in Chronic Care Management services and any associated charges.

## 2024-05-13 NOTE — TELEPHONE ENCOUNTER
Spoke with patient and she would like for a nurse to call her back in regards to what she needs to do to obtain an electric scooter, patient states its getting harder for her to get around.    Pt can be called at [phone]

## 2024-05-13 NOTE — PROGRESS NOTES
1st attempt ER f/up apt request    Carl Yates NP  PCP  [address]  [address]  [facility] [address] [phone]  A[address]: [address] @11:40am    Dr. Timo Martinez   ORTHO  [address]  [address]  [facility] [address]  [phone]  A[address]: [address] @9am     No answer, LVM w/ apt info  Closing encounter

## 2024-05-14 ENCOUNTER — PATIENT OUTREACH (OUTPATIENT)
Dept: CASE MANAGEMENT | Age: 79
End: 2024-05-14

## 2024-05-14 ENCOUNTER — TELEPHONE (OUTPATIENT)
Dept: FAMILY MEDICINE CLINIC | Facility: CLINIC | Age: 79
End: 2024-05-14

## 2024-05-14 DIAGNOSIS — J44.89 ASTHMA WITH COPD (CHRONIC OBSTRUCTIVE PULMONARY DISEASE) (HCC): Primary | ICD-10-CM

## 2024-05-14 DIAGNOSIS — M81.0 AGE-RELATED OSTEOPOROSIS WITHOUT CURRENT PATHOLOGICAL FRACTURE: ICD-10-CM

## 2024-05-14 DIAGNOSIS — E06.3 HASHIMOTO'S DISEASE: ICD-10-CM

## 2024-05-14 DIAGNOSIS — I73.00 RAYNAUD'S DISEASE WITHOUT GANGRENE: ICD-10-CM

## 2024-05-14 DIAGNOSIS — F32.9 REACTIVE DEPRESSION: ICD-10-CM

## 2024-05-14 DIAGNOSIS — F41.9 ANXIETY: ICD-10-CM

## 2024-05-14 DIAGNOSIS — E03.9 ACQUIRED HYPOTHYROIDISM: ICD-10-CM

## 2024-05-14 RX ORDER — BUDESONIDE AND FORMOTEROL FUMARATE DIHYDRATE 160; 4.5 UG/1; UG/1
2 AEROSOL RESPIRATORY (INHALATION) 2 TIMES DAILY
COMMUNITY
Start: 2024-05-03

## 2024-05-14 NOTE — TELEPHONE ENCOUNTER
Pt requesting addresses for Yuliet Yates and Dr Timo Martinez    MCM sent with info per her request.

## 2024-05-14 NOTE — PROGRESS NOTES
Assessment:    I want to know a little about you.  What do you do for fun/hobbies? Patient loves to cook.  Are you retired? If not, what do you do for work? Patient is retired.    Social support:  Do you have someone you can turn to when you are very stressed and or need help? Family-Son and Daughter  Do you live with someone? No lives alone.  Do you have someone you check in with or talk to on a regular basis? Son and Daughter  Are you the primary caretaker for anyone? No  Do you have any pets at home? - tell me about your pets- No, son and grandchildren are allergic to cats and dogs.    Lets talk about stress.  How much stress do you feel you have in your life? Some stress.  Is it manageable? Yes  What are some of the causes of stress? Finances cause her stress.  How do you handle this stress? She is diligent about keeping an eye on her finances.   Explained to her that we have financial resources, if she ever needs them.     Care Team:  Updated specialists and add to care team:   Alejandro Serrano- Endocrinology  Mackenzie Maher- pulmonologist    Medication review:  Update medications with meds from other providers as well.  Do you take them every day on time? yes  Do you feel you can afford your medications? Yes    Nutrition: Lets talk about eating habits  Do you eat three small meals a day? Eats 3-4 small meals a day.  Breakfast is very important as it sets the tone for the day  Do you feel you need to work on your eating habits? No, meals are balanced, she prepares her own meals.  Do you eat a variety of fruits and veggies? Yes    Exercise:  Do you exercise? No at as much as she would like.   What do you do? Patient walks daily as tolerated.  If not, are you up and moving? Yes as tolerated due to back pain.   Do you want to work on incorporating more movement or exercise into your daily routine? Yes, has upcoming appointment with orthopedics to address back pain.      Disease specific:   Do you feel you have a good  understanding of your 1) Asthma with COPD, 2)Osteoporosis, 3)Hashimoto's? Yes no questions at this time.  Would you like more info on anything? Not at this moment.     Goals:  What would you say is your biggest concerns about your health? Currently her back pain is her biggest concern, she can no longer walk for long periods of time. She was also diagnosed with osteoporosis and is asking if she needs to start medication for this.   What steps do you think you could take to work on this? She will follow up with orthopedics in regards to her back pain. She will discuss osteoporosis with Yuliet Yates PA-C.  What is one baby step you are willing to work on? Continue to take her Calcium and Vitamin D and stay active as tolerated.   What do you think is a barrier or something that may be stopping you from doing this? Waiting to hear about the electric scooter she requested.   How can I help you achieve your goals? Patient has asked me to follow up with Dr Clifford's office about the electric scooter she has requested. Advised her that in her chart there is a note that states her request is under review with [employer]. (see 5/2/24 TE)    Personal:  Are you active on [employer]? Yes  Do you have a preference in day/time to reach out to you monthly? Prefers late afternoons.     Next month I will follow up and we will discuss your health, health habits and goals to set for moving forward.     Follow up:  You can follow up with me any time if you have any questions. I will follow up this call with a [employer] message and/or letter with my direct contact information for you to call if you should need anything before we talk next.     Care manager f/up: 1 month  Work on for next time:     Spoke to Arianna at length about CCM, current care plan and performed CCM assessment with Arianna reviewed meds and compliance.      Interventions for following categories based on assessment:  Continue to provide encouragement, support, and  education for healthy coping and diagnosis.    Encouraged patient:   Self care: Take the time to do the things you love. Such as cooking.  Nutrition:  Good nutrition helps us to maintain our weight, fight off infection, and help reduce the risk of developing other chronic issues.   Physical activity:  Physical activity is important to help maintain independence and improve quality of life.     Patient is currently in [place] and advised patient to drink plenty of water in order stay hydrated during hot weather due to high temperatures and humidity.        Health Maintenance: discussed with patient  Health Maintenance   Topic Date Due    Pneumococcal Vaccine: 65+ Years (1 of 2 - PCV) Never done    Zoster Vaccines (1 of 2) Never done    Colorectal Cancer Screening  01/01/2023    COVID-19 Vaccine (1 - 2023-24 season) Never done    Influenza Vaccine (Season Ended) 12/28/2024 (Originally 10/1/2024)    DEXA Scan  Completed    MA Annual Health Assessment  Completed    Annual Depression Screening  Completed    Fall Risk Screening (Annual)  Completed     Meds: Detailed medication review with Arianna. Discussed with Arianna if any potential barriers are present that could prevent compliance with medication regimen. At this time, no barriers reported. Arianna reports is stable on all medications and denies experiencing any side effects.    Your appointments       Date & Time Appointment Department (Center)    May 28, 2024 9:00 AM CDT Exam - New Patient with Timo Martinez MD [address] ([facility])        May 29, 2024 11:40 AM CDT Exam - Established with Yuliet Yates PA-C [address] ([facility] [address] [place] [address]/[facility])        Jun 06, 2024 6:30 AM CDT Laboratory Services with [facility] ([facility])        Jun 06, 2024 7:30 AM CDT XR MYELOGRAM WITH  SEDATION / ANESTHESIA with EH XR RM2 (MISC FLUOROSCOPY) [facility] X-ray ([facility])    Please arrive 30 minutes prior to your scheduled appointment time.    Prior to your arrival:    A nurse from Pre-Admission Testing will be calling you to do a brief Health History Screen over the phone and give you any further instructions if needed. This nurse will also be able to assist you should any additional testing be required before your procedures.    If you suspect you may be pregnant, please consult with your physician prior to your exam.    Please make sure an adult is available to drive you home and it is recommended that an adult stays overnight with you after the procedure.       A complete history and physical is required within 30 days of your scheduled procedure.    Day of procedure:    After you register, you will be escorted directly to the radiology department.     Please arrive on time for your lab appointment (60 minutes prior to the scheduled procedure time).    Dietary Instructions:    Please have nothing to eat or drink for 8 hours prior to your procedure except for your usual medication with a small amount of water.    The estimated duration of your visit could take 6 hours, which includes recovery time after the procedure.      Jun 06, 2024 8:00 AM CDT XR MYELOGRAM WITH SEDATION / ANESTHESIA with  RADIOLOGIST SPEC IR XR [facility] X-ray ([facility])    Please arrive 30 minutes prior to your scheduled appointment time.    Prior to your arrival:    A nurse from Pre-Admission Testing will be calling you to do a brief Health History Screen over the phone and give you any further instructions if needed. This nurse will also be able to assist you should any additional testing be required before your procedures.    If you suspect you may be pregnant, please consult with your physician prior to your exam.    Please make sure an adult is available  to drive you home and it is recommended that an adult stays overnight with you after the procedure.       A complete history and physical is required within 30 days of your scheduled procedure.    Day of procedure:    After you register, you will be escorted directly to the radiology department.     Please arrive on time for your lab appointment (60 minutes prior to the scheduled procedure time).    Dietary Instructions:    Please have nothing to eat or drink for 8 hours prior to your procedure except for your usual medication with a small amount of water.    The estimated duration of your visit could take 6 hours, which includes recovery time after the procedure.      Jun 06, 2024 8:30 AM CDT CT MYELOGRAM WITH SEDATION with  CT MAIN [address] ([facility])    Please arrive 30 minutes prior to your scheduled appointment time.    Prior to your arrival:  A nurse from Pre-Admission Testing will be calling you to do a brief Health History Screen over the phone and give you any further instructions if needed. This nurse will also be able to assist you should any additional testing be required before your procedures.    If you suspect you may be pregnant, please consult with your physician prior to your exam.    Please make sure an adult is available to drive you home and it is recommended that an adult stays overnight with you after the procedure.       A complete history and physical is required within 30 days of your scheduled procedure.    Day of procedure:  After you register, you will be escorted directly to the radiology department.     Please arrive on time for your lab appointment (60 minutes prior to the scheduled procedure time).    Dietary Instructions:  Please have nothing to eat or drink for 8 hours prior to your procedure except for your usual medication with a small amount of water.    The estimated duration of your visit could take 6 hours, which includes recovery  time after the procedure.      Jun 06, 2024 9:00 AM CDT CT MYELOGRAM WITH SEDATION with PERIOP RADIOLOGY [facility] Perioperative Service ([facility])    Please arrive 30 minutes prior to your scheduled appointment time.    Prior to your arrival:  A nurse from Pre-Admission Testing will be calling you to do a brief Health History Screen over the phone and give you any further instructions if needed. This nurse will also be able to assist you should any additional testing be required before your procedures.    If you suspect you may be pregnant, please consult with your physician prior to your exam.    Please make sure an adult is available to drive you home and it is recommended that an adult stays overnight with you after the procedure.       A complete history and physical is required within 30 days of your scheduled procedure.    Day of procedure:  After you register, you will be escorted directly to the radiology department.     Please arrive on time for your lab appointment (60 minutes prior to the scheduled procedure time).    Dietary Instructions:  Please have nothing to eat or drink for 8 hours prior to your procedure except for your usual medication with a small amount of water.    The estimated duration of your visit could take 6 hours, which includes recovery time after the procedure.      Nov 15, 2024 10:30 AM CST Exam - Established with Kathi Sow DO [facility] ([facility])              [facility]  [address] [address]  [phone] [facility] [address]  [facility]  [address] [address]  [phone] [facility] Perioperative Service  [facility]  [address] [address]  [phone]    [facility]  X-ray  [facility]  [address]  [facility] [address]  [phone] [facility], [address] [place] [address], [place]  [facility] [place]  [address]  [facility] [id number] [phone] [facility], [address] [place] [address], [facility] [employer]  [address]  [facility] [id number] [phone]    [facility]  [address]  [facility] [address]  [phone]            Time Spent This Encounter Total: 29 min medical record review, telephone communication, care plan updates where needed, and education. Provided acknowledgment and validation to patient's concerns.   Monthly Minute Total including today: 29 min    Physical assessment, complete health history, and need for CCM established by Micheal Clifford MD.

## 2024-05-18 NOTE — TELEPHONE ENCOUNTER
Will need office visit to complete documentation for insurance coverage. Please also contact Ever from [employer] to facilitate. Thanks

## 2024-05-20 NOTE — TELEPHONE ENCOUNTER
Left message for patient to call back and schedule office visit to discuss obtaining a scooter. Will den my chart message as well

## 2024-05-20 NOTE — TELEPHONE ENCOUNTER
[facility] ([place] [address])  Adult Nutrition  Consult Note    SUMMARY     Recommendations    1. Continue current Diabetic diet, add Boost Glucose Control ONS to aid in caloric intake.   2. RD to monitor & follow-up.    Goals: Meet % EEN, EPN by RD f/u date  Nutrition Goal Status: new  Communication of RD Recs: reviewed with RN    Assessment and Plan    Severe malnutrition    Nutrition Problem:  Severe Protein-Calorie Malnutrition  Malnutrition in the context of Chronic Illness/Injury    Related to (etiology):  Inability to consume sufficient energy     Signs and Symptoms (as evidenced by):  Energy Intake: <75% of estimated energy requirement for 1-2 months  Body Fat Depletion: moderate and severe depletion of orbitals and triceps   Muscle Mass Depletion: moderate and severe depletion of temples, clavicle region and lower extremities   Weight Loss: 18% x 3-4 months    Interventions(treatment strategy):  Collaboration of nutrition care w/ other providers  ONS    Nutrition Diagnosis Status:  New     Malnutrition Assessment    Weight Loss (Malnutrition): greater than 7.5% in 3 months  Energy Intake (Malnutrition): less than 75% for greater than or equal to 1 month   Orbital Region (Subcutaneous Fat Loss): moderate depletion  Upper Arm Region (Subcutaneous Fat Loss): severe depletion   [place] Region (Muscle Loss): moderate depletion  Clavicle Bone Region (Muscle Loss): severe depletion  Dorsal Hand (Muscle Loss): moderate depletion  Anterior Thigh Region (Muscle Loss): severe depletion     Reason for Assessment    Reason For Assessment: consult  Diagnosis: other (see comments) (DKA)  Relevant Medical History: HTN, DM  Interdisciplinary Rounds: did not attend    General Information Comments: Pt reports improving appetite, consumed 100% of breakfast this AM - tolerating diet. Pt willing to try ONS. PTA - pt reports decreased appetite x 1-2 months & weight loss x 3-4 months; chart review confirms  Future Appointments   Date Time Provider Department Center   5/29/2024 11:20 AM Yuliet Yates PA-C [facility]        "weight loss (weighed 200# x 2022). NFPE complete at SNF 3/28 & pt dianosed w/ severe malnutrition, which continues at this time. Please see PES statement for details. Noted A1C of 9.7 - Diabetic diet education reviewed & paperwork provided.  Nutrition Discharge Planning: Adequate PO intake    Nutrition/Diet History    Factors Affecting Nutritional Intake: None identified at this time    Anthropometrics    Temp: 97.9 °F (36.6 °C)  Height Method: Stated  Height: 6' 2" (188 cm)  Height (inches): 74 in  Weight Method: Bed Scale  Weight: 75 kg (165 lb 5.5 oz)  Weight (lb): 165.35 lb  Ideal Body Weight (IBW), Male: 190 lb  % Ideal Body Weight, Male (lb): 87.03 %  BMI (Calculated): 21.2  BMI Grade: 18.5-24.9 - normal  Usual Body Weight (UBW), k kg  % Usual Body Weight: 82.59  % Weight Change From Usual Weight: -17.58 %    Lab/Procedures/Meds    Pertinent Labs Reviewed: reviewed  Pertinent Labs Comments: A1C 9.7  Pertinent Medications Reviewed: reviewed    Estimated/Assessed Needs    Weight Used For Calorie Calculations: 75 kg (165 lb 5.5 oz)     Energy Calorie Requirements (kcal): 1924 kcal/d  Energy Need Method: [place]-St Jeor (1.25 PAL)     Protein Requirements: 90 g/d (1.2 g/kg)  Weight Used For Protein Calculations: 75 kg (165 lb 5.5 oz)     Estimated Fluid Requirement Method: other (see comments) (Per MD or 1 mL/kcal)  RDA Method (mL): 1924    Nutrition Prescription Ordered    Current Diet Order: 2000 kcal ADA    Evaluation of Received Nutrient/Fluid Intake    I/O: -7.6L since admit    Comments: LBM: 4/5    Tolerance: tolerating    Nutrition Risk    Level of Risk/Frequency of Follow-up: (1x/week)     Monitor and Evaluation    Food and Nutrient Intake: energy intake, food and beverage intake  Food and Nutrient Adminstration: diet order  Physical Activity and Function: nutrition-related ADLs and IADLs  Anthropometric Measurements: weight, weight change  Biochemical Data, Medical Tests and Procedures: " glucose/endocrine profile, lipid profile, inflammatory profile, gastrointestinal profile, electrolyte and renal panel  Nutrition-Focused Physical Findings: overall appearance     Nutrition Follow-Up    RD Follow-up?: Yes

## 2024-05-21 ENCOUNTER — MED REC SCAN ONLY (OUTPATIENT)
Facility: CLINIC | Age: 79
End: 2024-05-21

## 2024-05-28 ENCOUNTER — OFFICE VISIT (OUTPATIENT)
Dept: ORTHOPEDICS CLINIC | Facility: CLINIC | Age: 79
End: 2024-05-28

## 2024-05-28 DIAGNOSIS — M51.37 DEGENERATIVE DISC DISEASE AT L5-S1 LEVEL: Primary | ICD-10-CM

## 2024-05-28 PROCEDURE — 1159F MED LIST DOCD IN RCRD: CPT | Performed by: STUDENT IN AN ORGANIZED HEALTH CARE EDUCATION/TRAINING PROGRAM

## 2024-05-28 PROCEDURE — 1160F RVW MEDS BY RX/DR IN RCRD: CPT | Performed by: STUDENT IN AN ORGANIZED HEALTH CARE EDUCATION/TRAINING PROGRAM

## 2024-05-28 PROCEDURE — 99204 OFFICE O/P NEW MOD 45 MIN: CPT | Performed by: STUDENT IN AN ORGANIZED HEALTH CARE EDUCATION/TRAINING PROGRAM
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
## 2024-05-29 ENCOUNTER — PATIENT MESSAGE (OUTPATIENT)
Dept: INTERNAL MEDICINE CLINIC | Facility: CLINIC | Age: 79
End: 2024-05-29

## 2024-05-29 ENCOUNTER — OFFICE VISIT (OUTPATIENT)
Dept: INTERNAL MEDICINE CLINIC | Facility: CLINIC | Age: 79
End: 2024-05-29

## 2024-05-29 VITALS
TEMPERATURE: 97 F | DIASTOLIC BLOOD PRESSURE: 60 MMHG | HEART RATE: 82 BPM | SYSTOLIC BLOOD PRESSURE: 112 MMHG | BODY MASS INDEX: 22.83 KG/M2 | HEIGHT: 57 IN | WEIGHT: 105.81 LBS | OXYGEN SATURATION: 98 % | RESPIRATION RATE: 18 BRPM

## 2024-05-29 VITALS — WEIGHT: 106 LBS | BODY MASS INDEX: 22.87 KG/M2 | HEIGHT: 57 IN

## 2024-05-29 DIAGNOSIS — M50.30 DDD (DEGENERATIVE DISC DISEASE), CERVICAL: ICD-10-CM

## 2024-05-29 DIAGNOSIS — J45.40 MODERATE PERSISTENT ASTHMA WITHOUT COMPLICATION (HCC): Primary | ICD-10-CM

## 2024-05-29 DIAGNOSIS — G96.198 EPIDURAL MASS: ICD-10-CM

## 2024-05-29 DIAGNOSIS — M51.34 THORACIC DEGENERATIVE DISC DISEASE: ICD-10-CM

## 2024-05-29 PROCEDURE — 1160F RVW MEDS BY RX/DR IN RCRD: CPT | Performed by: PHYSICIAN ASSISTANT

## 2024-05-29 PROCEDURE — 3008F BODY MASS INDEX DOCD: CPT | Performed by: PHYSICIAN ASSISTANT

## 2024-05-29 PROCEDURE — 3074F SYST BP LT 130 MM HG: CPT | Performed by: PHYSICIAN ASSISTANT

## 2024-05-29 PROCEDURE — 1159F MED LIST DOCD IN RCRD: CPT | Performed by: PHYSICIAN ASSISTANT

## 2024-05-29 PROCEDURE — 99214 OFFICE O/P EST MOD 30 MIN: CPT | Performed by: PHYSICIAN ASSISTANT

## 2024-05-29 PROCEDURE — 3078F DIAST BP <80 MM HG: CPT | Performed by: PHYSICIAN ASSISTANT

## 2024-05-29 RX ORDER — SODIUM CHLORIDE, SODIUM LACTATE, POTASSIUM CHLORIDE, CALCIUM CHLORIDE 600; 310; 30; 20 MG/100ML; MG/100ML; MG/100ML; MG/100ML
INJECTION, SOLUTION INTRAVENOUS CONTINUOUS
OUTPATIENT
Start: 2024-05-29

## 2024-05-29 RX ORDER — NAPROXEN 500 MG/1
500 TABLET ORAL 2 TIMES DAILY PRN
Status: ON HOLD | COMMUNITY
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
## 2024-05-30 ENCOUNTER — TELEPHONE (OUTPATIENT)
Dept: SURGERY | Facility: CLINIC | Age: 79
End: 2024-05-30

## 2024-05-30 ENCOUNTER — HOSPITAL ENCOUNTER (EMERGENCY)
Age: 79
Discharge: HOME | End: 2024-05-30
Attending: EMERGENCY MEDICINE
Payer: MEDICARE

## 2024-05-30 ENCOUNTER — APPOINTMENT (OUTPATIENT)
Dept: GENERAL RADIOLOGY | Age: 79
End: 2024-05-30
Attending: PHYSICIAN ASSISTANT
Payer: MEDICARE

## 2024-05-30 ENCOUNTER — TELEPHONE (OUTPATIENT)
Dept: INTERNAL MEDICINE CLINIC | Facility: CLINIC | Age: 79
End: 2024-05-30

## 2024-05-30 ENCOUNTER — NURSE TRIAGE (OUTPATIENT)
Dept: INTERNAL MEDICINE CLINIC | Facility: CLINIC | Age: 79
End: 2024-05-30

## 2024-05-30 ENCOUNTER — APPOINTMENT (OUTPATIENT)
Dept: ULTRASOUND IMAGING | Age: 79
End: 2024-05-30
Attending: PHYSICIAN ASSISTANT
Payer: MEDICARE

## 2024-05-30 VITALS
WEIGHT: 107 LBS | RESPIRATION RATE: 16 BRPM | SYSTOLIC BLOOD PRESSURE: 144 MMHG | BODY MASS INDEX: 23 KG/M2 | DIASTOLIC BLOOD PRESSURE: 65 MMHG | HEART RATE: 82 BPM | TEMPERATURE: 98 F | OXYGEN SATURATION: 98 %

## 2024-05-30 DIAGNOSIS — M25.552 LEFT HIP PAIN: Primary | ICD-10-CM

## 2024-05-30 DIAGNOSIS — M25.562 ACUTE PAIN OF LEFT KNEE: ICD-10-CM

## 2024-05-30 PROCEDURE — 73562 X-RAY EXAM OF KNEE 3: CPT | Performed by: PHYSICIAN ASSISTANT

## 2024-05-30 PROCEDURE — 73502 X-RAY EXAM HIP UNI 2-3 VIEWS: CPT | Performed by: PHYSICIAN ASSISTANT

## 2024-05-30 PROCEDURE — 99284 EMERGENCY DEPT VISIT MOD MDM: CPT

## 2024-05-30 PROCEDURE — 93971 EXTREMITY STUDY: CPT | Performed by: PHYSICIAN ASSISTANT

## 2024-05-30 NOTE — TELEPHONE ENCOUNTER
Received call from pt. Pt confused because she got message to complete PFT/mannitol by today (was ordered 6/6/23). Pt did complete PFT last June.     AD, ok to discontinue order?

## 2024-05-30 NOTE — DISCHARGE INSTRUCTIONS
Follow-up with your primary care doctor within the next week for reassessment.  Return for any concerning symptoms.

## 2024-05-30 NOTE — TELEPHONE ENCOUNTER
Needs medication benydral and other for her scheduled procedure, also would like to discuss severe pain in left leg.
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
## 2024-05-30 NOTE — TELEPHONE ENCOUNTER
Attempted to return pt call no answer: phone rang multiple times w/o VM    Routed to provider to inquire if medication orders can be placed if appropriate

## 2024-05-30 NOTE — TELEPHONE ENCOUNTER
Action Requested: Summary for Provider     []  Critical Lab, Recommendations Needed  [] Need Additional Advice  []   FYI    []   Need Orders  [] Need Medications Sent to Pharmacy  []  Other     SUMMARY: Patient reports left hip and groin pain that started this morning, \"could barely get out of bed\".  Patient states this has happened before several times.  Concerned as she has Myelogram scheduled next week.  While triaging hip and groin pain, patient reported that she felt short of breath.  Advised patient to go to IC for immediate evaluation.  She confirms understanding and will do so.      Reason for call: Hip Pain  Onset: This morning.      Pain is mild now, intermittent.  Patient reports she is \"not feeling too good this morning\".  Used albuterol inhaler while on phone. Patient was able to speak in full sentences during call.       Reason for Disposition   MODERATE pain (e.g., interferes with normal activities, limping) and present > 3 days    Protocols used: Hip Pain-A-OH

## 2024-05-31 RX ORDER — PREDNISONE, DIPHENHYDRAMINE
1 KIT ONCE
Qty: 1 KIT | Refills: 0 | Status: ON HOLD | OUTPATIENT
Start: 2024-05-31 | End: 2024-05-31

## 2024-05-31 NOTE — TELEPHONE ENCOUNTER
Messages below noted.    Pharmacy called stating pre-contrast allergy kit is not covered or in stock.      Verbal Rx taken by pharm. for:     Prednisone 50mg qty3 (1 at 13hrs prior, 7 hrs prior, 1 hour prior)    Benadryl 50mg qty1 (1 hour prior to contrast infusion)      Order confirmed and is being filled for pt.    Nothing further needed at this time, closing encounter.

## 2024-06-03 ENCOUNTER — HOSPITAL ENCOUNTER (OUTPATIENT)
Facility: HOSPITAL | Age: 79
Setting detail: OBSERVATION
Discharge: HOME | End: 2024-06-04
Attending: EMERGENCY MEDICINE | Admitting: HOSPITALIST
Payer: MEDICARE

## 2024-06-03 ENCOUNTER — APPOINTMENT (OUTPATIENT)
Dept: GENERAL RADIOLOGY | Facility: HOSPITAL | Age: 79
End: 2024-06-03
Payer: MEDICARE

## 2024-06-03 DIAGNOSIS — R06.00 DYSPNEA, UNSPECIFIED TYPE: Primary | ICD-10-CM

## 2024-06-03 LAB
ALBUMIN SERPL-MCNC: 4.2 G/DL (ref 3.4–5)
ALBUMIN/GLOB SERPL: 1.2 {RATIO} (ref 1–2)
ALP LIVER SERPL-CCNC: 69 U/L
ALT SERPL-CCNC: 27 U/L
ANION GAP SERPL CALC-SCNC: 6 MMOL/L (ref 0–18)
APTT PPP: 28.1 SECONDS (ref 23–36)
AST SERPL-CCNC: 24 U/L (ref 15–37)
BASOPHILS # BLD AUTO: 0.05 X10(3) UL (ref 0–0.2)
BASOPHILS NFR BLD AUTO: 0.8 %
BILIRUB SERPL-MCNC: 0.6 MG/DL (ref 0.1–2)
BUN BLD-MCNC: 17 MG/DL (ref 9–23)
CALCIUM BLD-MCNC: 9.5 MG/DL (ref 8.5–10.1)
CHLORIDE SERPL-SCNC: 106 MMOL/L (ref 98–112)
CO2 SERPL-SCNC: 26 MMOL/L (ref 21–32)
CREAT BLD-MCNC: 0.6 MG/DL
D DIMER PPP FEU-MCNC: <0.27 UG/ML FEU (ref ?–0.81)
EGFRCR SERPLBLD CKD-EPI 2021: 90 ML/MIN/1.73M2 (ref 60–?)
EOSINOPHIL # BLD AUTO: 0.04 X10(3) UL (ref 0–0.7)
EOSINOPHIL NFR BLD AUTO: 0.6 %
ERYTHROCYTE [DISTWIDTH] IN BLOOD BY AUTOMATED COUNT: 13.2 %
FLUAV + FLUBV RNA SPEC NAA+PROBE: NEGATIVE
FLUAV + FLUBV RNA SPEC NAA+PROBE: NEGATIVE
GLOBULIN PLAS-MCNC: 3.4 G/DL (ref 2.8–4.4)
GLUCOSE BLD-MCNC: 87 MG/DL (ref 70–99)
HCT VFR BLD AUTO: 38.4 %
HGB BLD-MCNC: 13.1 G/DL
IMM GRANULOCYTES # BLD AUTO: 0.01 X10(3) UL (ref 0–1)
IMM GRANULOCYTES NFR BLD: 0.2 %
INR BLD: 0.96 (ref 0.8–1.2)
LYMPHOCYTES # BLD AUTO: 2.28 X10(3) UL (ref 1–4)
LYMPHOCYTES NFR BLD AUTO: 34.8 %
MCH RBC QN AUTO: 31 PG (ref 26–34)
MCHC RBC AUTO-ENTMCNC: 34.1 G/DL (ref 31–37)
MCV RBC AUTO: 90.8 FL
MONOCYTES # BLD AUTO: 0.31 X10(3) UL (ref 0.1–1)
MONOCYTES NFR BLD AUTO: 4.7 %
NEUTROPHILS # BLD AUTO: 3.87 X10 (3) UL (ref 1.5–7.7)
NEUTROPHILS # BLD AUTO: 3.87 X10(3) UL (ref 1.5–7.7)
NEUTROPHILS NFR BLD AUTO: 58.9 %
NT-PROBNP SERPL-MCNC: 54 PG/ML (ref ?–450)
OSMOLALITY SERPL CALC.SUM OF ELEC: 287 MOSM/KG (ref 275–295)
PLATELET # BLD AUTO: 227 10(3)UL (ref 150–450)
POTASSIUM SERPL-SCNC: 3.7 MMOL/L (ref 3.5–5.1)
PROT SERPL-MCNC: 7.6 G/DL (ref 6.4–8.2)
PROTHROMBIN TIME: 12.8 SECONDS (ref 11.6–14.8)
RBC # BLD AUTO: 4.23 X10(6)UL
RSV RNA SPEC NAA+PROBE: NEGATIVE
SARS-COV-2 RNA RESP QL NAA+PROBE: NOT DETECTED
SODIUM SERPL-SCNC: 138 MMOL/L (ref 136–145)
TROPONIN I SERPL HS-MCNC: <3 NG/L
WBC # BLD AUTO: 6.6 X10(3) UL (ref 4–11)

## 2024-06-03 PROCEDURE — 71046 X-RAY EXAM CHEST 2 VIEWS: CPT

## 2024-06-03 PROCEDURE — 99223 1ST HOSP IP/OBS HIGH 75: CPT | Performed by: HOSPITALIST

## 2024-06-03 RX ORDER — IPRATROPIUM BROMIDE AND ALBUTEROL SULFATE 2.5; .5 MG/3ML; MG/3ML
3 SOLUTION RESPIRATORY (INHALATION) ONCE
Status: COMPLETED | OUTPATIENT
Start: 2024-06-03 | End: 2024-06-03

## 2024-06-03 RX ORDER — METHYLPREDNISOLONE SODIUM SUCCINATE 125 MG/2ML
125 INJECTION, POWDER, LYOPHILIZED, FOR SOLUTION INTRAMUSCULAR; INTRAVENOUS ONCE
Status: COMPLETED | OUTPATIENT
Start: 2024-06-03 | End: 2024-06-03

## 2024-06-03 NOTE — TELEPHONE ENCOUNTER
Received fax from [email or web address] with Power Wheelchair Face-to-Face Exam Requirements.  Copy sent to patient per MyChart to discuss with Orthopedics or Neurosurgery.

## 2024-06-04 ENCOUNTER — TELEPHONE (OUTPATIENT)
Dept: INTERNAL MEDICINE CLINIC | Facility: CLINIC | Age: 79
End: 2024-06-04

## 2024-06-04 ENCOUNTER — TELEPHONE (OUTPATIENT)
Dept: SURGERY | Facility: CLINIC | Age: 79
End: 2024-06-04

## 2024-06-04 VITALS
OXYGEN SATURATION: 100 % | BODY MASS INDEX: 22 KG/M2 | WEIGHT: 100.75 LBS | HEART RATE: 81 BPM | SYSTOLIC BLOOD PRESSURE: 116 MMHG | RESPIRATION RATE: 14 BRPM | DIASTOLIC BLOOD PRESSURE: 51 MMHG | TEMPERATURE: 98 F

## 2024-06-04 LAB
ATRIAL RATE: 59 BPM
P AXIS: 74 DEGREES
P-R INTERVAL: 160 MS
Q-T INTERVAL: 428 MS
QRS DURATION: 80 MS
QTC CALCULATION (BEZET): 423 MS
R AXIS: 36 DEGREES
T AXIS: 64 DEGREES
VENTRICULAR RATE: 59 BPM

## 2024-06-04 PROCEDURE — 99239 HOSP IP/OBS DSCHRG MGMT >30: CPT | Performed by: INTERNAL MEDICINE

## 2024-06-04 RX ORDER — ACETAMINOPHEN 500 MG
500 TABLET ORAL EVERY 4 HOURS PRN
Status: DISCONTINUED | OUTPATIENT
Start: 2024-06-04 | End: 2024-06-04

## 2024-06-04 RX ORDER — POLYETHYLENE GLYCOL 3350 17 G/17G
17 POWDER, FOR SOLUTION ORAL DAILY PRN
Status: DISCONTINUED | OUTPATIENT
Start: 2024-06-04 | End: 2024-06-04

## 2024-06-04 RX ORDER — LEVOTHYROXINE SODIUM 0.03 MG/1
25 TABLET ORAL
Status: DISCONTINUED | OUTPATIENT
Start: 2024-06-04 | End: 2024-06-04

## 2024-06-04 RX ORDER — METHYLPREDNISOLONE SODIUM SUCCINATE 40 MG/ML
40 INJECTION, POWDER, LYOPHILIZED, FOR SOLUTION INTRAMUSCULAR; INTRAVENOUS EVERY 8 HOURS
Status: DISCONTINUED | OUTPATIENT
Start: 2024-06-04 | End: 2024-06-04

## 2024-06-04 RX ORDER — PREDNISONE 10 MG/1
20 TABLET ORAL DAILY
Qty: 8 TABLET | Refills: 0 | Status: SHIPPED | OUTPATIENT
Start: 2024-06-04 | End: 2024-06-08

## 2024-06-04 RX ORDER — POTASSIUM CHLORIDE 20 MEQ/1
40 TABLET, EXTENDED RELEASE ORAL ONCE
Status: COMPLETED | OUTPATIENT
Start: 2024-06-04 | End: 2024-06-04

## 2024-06-04 RX ORDER — ENEMA 19; 7 G/133ML; G/133ML
1 ENEMA RECTAL ONCE AS NEEDED
Status: DISCONTINUED | OUTPATIENT
Start: 2024-06-04 | End: 2024-06-04

## 2024-06-04 RX ORDER — FLUTICASONE FUROATE AND VILANTEROL 200; 25 UG/1; UG/1
1 POWDER RESPIRATORY (INHALATION) DAILY
Status: DISCONTINUED | OUTPATIENT
Start: 2024-06-04 | End: 2024-06-04

## 2024-06-04 RX ORDER — SENNOSIDES 8.6 MG
17.2 TABLET ORAL NIGHTLY PRN
Status: DISCONTINUED | OUTPATIENT
Start: 2024-06-04 | End: 2024-06-04

## 2024-06-04 RX ORDER — FLUTICASONE FUROATE AND VILANTEROL 100; 25 UG/1; UG/1
1 POWDER RESPIRATORY (INHALATION) DAILY
Status: DISCONTINUED | OUTPATIENT
Start: 2024-06-04 | End: 2024-06-04

## 2024-06-04 RX ORDER — METOCLOPRAMIDE HYDROCHLORIDE 5 MG/ML
10 INJECTION INTRAMUSCULAR; INTRAVENOUS EVERY 8 HOURS PRN
Status: DISCONTINUED | OUTPATIENT
Start: 2024-06-04 | End: 2024-06-04

## 2024-06-04 RX ORDER — ONDANSETRON 2 MG/ML
4 INJECTION INTRAMUSCULAR; INTRAVENOUS EVERY 6 HOURS PRN
Status: DISCONTINUED | OUTPATIENT
Start: 2024-06-04 | End: 2024-06-04

## 2024-06-04 RX ORDER — IPRATROPIUM BROMIDE AND ALBUTEROL SULFATE 2.5; .5 MG/3ML; MG/3ML
3 SOLUTION RESPIRATORY (INHALATION) EVERY 6 HOURS PRN
Status: DISCONTINUED | OUTPATIENT
Start: 2024-06-04 | End: 2024-06-04

## 2024-06-04 RX ORDER — BISACODYL 10 MG
10 SUPPOSITORY, RECTAL RECTAL
Status: DISCONTINUED | OUTPATIENT
Start: 2024-06-04 | End: 2024-06-04

## 2024-06-04 NOTE — DISCHARGE SUMMARY
[facility]     Arianna Colin Patient Status:  Observation    1945 MRN [id number]   Location [facility]-A Attending Joao Ndiaye MD   Hosp Day # 0 PCP Micheal Clifford MD     Date of Admission: 6/3/2024  Date of Discharge:   2024      Discharge Disposition: Home or Self Care    Discharge Diagnosis:  Asthma/COPD exacerabtion  Cystif Extradual lesion of lumbar spine  Hypothyroid    History of Present Illness: Arianna Colin is a 79 year old female with medical history of asthma and COPD, fibromyalgia, thyroid disease and a recently diagnosed cystic extradural lesion of the lumbar spine who comes to the ER with complaints of shortness of breath and cough for the past 2 weeks.  She has been taking albuterol at home without any improvement.  She states that her shortness of breath is now worse on exertion and she therefore came to the ER for further evaluation.  She was given a DuoNeb treatment as well as steroids with some improvement in her shortness of breath.  She will be admitted for further care     Brief Synopsis: Pt was admitted and given nebs and steroids with improvement of her symptoms. She will have script for burst of steroid and follow up with PCP for outpatient PFTs testing.     Lace+ Score: 75  59-90 High Risk  29-58 Medium Risk  0-28   Low Risk       TCM Follow-Up Recommendation:  LACE > 58: High Risk of readmission after discharge from the hospital.      Procedures during hospitalization:   none    Incidental or significant findings and recommendations (brief descriptions):  none    Lab/Test results pending at Discharge:   none    Consultants:  none    Discharge Medication List:     Discharge Medications        START taking these medications        Instructions Prescription details   predniSONE 10 MG Tabs  Commonly known as: Deltasone      Take 2 tablets (20 mg total) by mouth daily for 4 days.   Stop taking on: 2024  Quantity: 8 tablet  Refills:  0            CHANGE how you take these medications        Instructions Prescription details   naproxen 500 MG Tabs  Commonly known as: Naprosyn  What changed: Another medication with the same name was removed. Continue taking this medication, and follow the directions you see here.      Take 1 tablet (500 mg total) by mouth 2 (two) times daily as needed (for pain).   Refills: 0            CONTINUE taking these medications        Instructions Prescription details   albuterol 108 (90 Base) MCG/ACT Aers  Commonly known as: Ventolin HFA      Inhale 2 puffs into the lungs every 6 (six) hours as needed for Wheezing.   Quantity: 1 each  Refills: 1     GABRIELLA-MAG OR      Take 600 mg by mouth in the morning and 600 mg before bedtime.   Refills: 0     COLLAGEN OR      Take by mouth.   Refills: 0     FLAXSEED OIL OR      Take by mouth.   Refills: 0     fluticasone furoate-vilanterol 100-25 MCG/ACT Aepb  Commonly known as: Breo Ellipta      Inhale 1 puff into the lungs daily.   Quantity: 1 each  Refills: 5     Nebulizer/Tubing/Mouthpiece Kit      Use as directed.   Quantity: 1 each  Refills: 0     PATIENT SUPPLIED MEDICATION      daily. Gallbladder formula, super colon cleanse   Refills: 0     QC TUMERIC COMPLEX OR      Take by mouth.   Refills: 0     Symbicort 160-4.5 MCG/ACT Aero  Generic drug: Budesonide-Formoterol Fumarate      Inhale 2 puffs into the lungs 2 (two) times daily.   Refills: 0     Synthroid 25 MCG Tabs  Generic drug: levothyroxine      Take 1 tablet of 25 mcg on Monday-Friday Take 2 tablets of 25 mcg (total 50 mcg) on Saturday and Sunday   Quantity: 90 tablet  Refills: 1     vitamin B-12 50 MCG Tabs  Commonly known as: CYANOCOBALAMIN      Take 1 tablet (50 mcg total) by mouth daily.   Refills: 0     vitamin C 100 MG Tabs      Take 1 tablet (100 mg total) by mouth daily.   Refills: 0     Vitamin D 50 MCG (2000 UT) Caps      Take by mouth.   Refills: 0            STOP taking these medications      Contrast Allergy  PreMed Pack 3 x 50 MG & 1 x 50 MG Kit  Generic drug: predniSONE & diphenhydrAMINE               ASK your doctor about these medications        Instructions Prescription details   NON FORMULARY      quersetin   Refills: 0               Where to Get Your Medications        These medications were sent to [id number] - [place], [place] - [address], [phone], [phone]  [address], [facility] [address]      Phone: [phone]   predniSONE 10 MG Tabs         [place]    Follow-up appointment:   No follow-up provider specified.  Appointments for Next 30 Days 6/4/2024 - 7/4/2024        Date Arrival Time Visit Type Length Department Provider     6/4/2024  3:00 PM  PRESURGICAL VISIT [2854] 40 min [facility], [address] [place] [address] Micheal Clifford MD    Patient Instructions:         Location Instructions:     Masks are optional for all patients and visitors, unless otherwise indicated.               6/6/2024  6:30 AM  ADULT LAB [2063] 15 min [facility]    Patient Instructions:         Location Instructions:     Your appointment is scheduled at [facility].&nbsp; The address is [address] [place] [id number].  To reach Outpatient Registration, park in the South Parking Garage and enter the double doors located on the ground floor. Proceed to the lobby past the Information Desk to Outpatient Registration.  [facility] Hours of Operation:  MONDAY-FRIDAY 6:00AM - 8:00PM SATURDAY 6:00AM - 3:00PM SUNDAY 8:AM-12:00PM  Masks are optional for all patients and visitors, unless otherwise indicated.               6/6/2024  7:30 AM  EDW XR SEDATE ANES MYELOGRAM [4028] 60 min [facility] X-ray  XR RM2 (MISC FLUOROSCOPY)     6/6/2024  8:00 AM  EDW XR SEDATE ANES MYELOGRAM [4028] 30 min [facility] X-ray  RADIOLOGIST SPEC IR XR    Patient  Instructions:     Please arrive 30 minutes prior to your scheduled appointment time.    Prior to your arrival:    A nurse from Pre-Admission Testing will be calling you to do a brief Health History Screen over the phone and give you any further instructions if needed. This nurse will also be able to assist you should any additional testing be required before your procedures.    If you suspect you may be pregnant, please consult with your physician prior to your exam.    Please make sure an adult is available to drive you home and it is recommended that an adult stays overnight with you after the procedure.       A complete history and physical is required within 30 days of your scheduled procedure.    Day of procedure:    After you register, you will be escorted directly to the radiology department.     Please arrive on time for your lab appointment (60 minutes prior to the scheduled procedure time).    Dietary Instructions:    Please have nothing to eat or drink for 8 hours prior to your procedure except for your usual medication with a small amount of water.    The estimated duration of your visit could take 6 hours, which includes recovery time after the procedure.      Location Instructions:     Your appointment will be at [facility] located at [address] [place] [address].&nbsp; To reach Outpatient Registration, park in the South Parking Garage and enter the double doors located on the ground floor.&nbsp; Proceed to the lobby past the Information Desk to Outpatient Registration. The phone number for this location is [phone].  If you suspect you may be pregnant please consult with your physician prior to your exam. If you have a continuous glucose monitor you may be asked to remove it during the exam.  Because of the nature of the Emergency Department, please be advised of the possibility your appointment may be delayed.  Please bring your insurance card and photo ID. You will also  need to bring your doctor's order unless your physician's office submitted the order electronically or faxed the order. Without the order, your test may be delayed or postponed.  Children: Children under the age of 12 must have another adult caregiver with them.&nbsp; Please do not bring your child/children without a caregiver.&nbsp; Because of the highly sensitive equipment and privacy of all our patients, children will not be permitted in the exam rooms, unless otherwise noted and in accordance with departmental policy.  PATIENT RESPONSIBILITY ESTIMATE  - (Estimate) We will provide you with your estimated remaining deductible and coinsurance balance for your services at the time of check in.  - (Payment) Please be aware that you may be asked for payment at the time of service.  Masks are optional for all patients and visitors, unless otherwise indicated.               6/6/2024  8:30 AM  EDW CT SEDATE ANES MYELOGRAM [4029] 30 min [facility] CT EH CT MAIN RM3     6/6/2024  9:00 AM  EDW CT SEDATE ANES MYELOGRAM [4029] 90 min [facility] Perioperative Service PERIOP RADIOLOGY RM    Patient Instructions:     Please arrive 30 minutes prior to your scheduled appointment time.    Prior to your arrival:  A nurse from Pre-Admission Testing will be calling you to do a brief Health History Screen over the phone and give you any further instructions if needed. This nurse will also be able to assist you should any additional testing be required before your procedures.    If you suspect you may be pregnant, please consult with your physician prior to your exam.    Please make sure an adult is available to drive you home and it is recommended that an adult stays overnight with you after the procedure.       A complete history and physical is required within 30 days of your scheduled procedure.    Day of procedure:  After you register, you will be escorted directly to the radiology department.     Please arrive on time  for your lab appointment (60 minutes prior to the scheduled procedure time).    Dietary Instructions:  Please have nothing to eat or drink for 8 hours prior to your procedure except for your usual medication with a small amount of water.    The estimated duration of your visit could take 6 hours, which includes recovery time after the procedure.      Location Instructions:     Your appointment will be at [facility] located at [address] [place] [address].&nbsp; To reach Outpatient Registration, turn onto [address] from [facility].&nbsp; You may park or  in the South Parking Garage.&nbsp; Enter the double doors located on the ground floor and proceed to the lobby past the Information Desk to Outpatient Registration.&nbsp; The phone number for this location is [phone].  If you suspect you may be pregnant please consult with your physician prior to your exam.&nbsp; If you have a continuous glucose monitor you will be asked to remove it during the exam.  Please bring your insurance card and photo ID. You will also need to bring your doctor's order unless your physician's office submitted the order electronically or faxed the order. Without the order, your test may be delayed or postponed.&nbsp; Certain health screening tests may not require an order.  Because of the nature of the Emergency Department, please be advised of the possibility that your appointment may be delayed.  Children: Children under the age of 12 must have another adult caregiver with them.&nbsp; Please do not bring your child/children without a caregiver.&nbsp; Because of the highly sensitive equipment and privacy of all our patients, children will not be permitted in the exam rooms, unless otherwise noted and in accordance with departmental policy.  PATIENT RESPONSIBILITY ESTIMATE  - (Estimate) We will provide you with your estimated remaining deductible and coinsurance balance for your services at the time  of check in.  - (Payment) Please be aware that you may be asked for payment at the time of service.  Masks are optional for all patients and visitors, unless otherwise indicated.                      Vital signs:  Temp:  [97.5 °F (36.4 °C)-98 °F (36.7 °C)] 97.8 °F (36.6 °C)  Pulse:  [60-76] 64  Resp:  [13-21] 21  BP: ()/(54-72) 91/55  SpO2:  [97 %-100 %] 100 %    Physical Exam:    General: No acute distress   Lungs: clear to auscultation  Cardiovascular: S1, S2  Abdomen: Soft      -----------------------------------------------------------------------------------------------  PATIENT DISCHARGE INSTRUCTIONS: See electronic chart    Joao Ndiaye MD    Total time spent on discharge plannin minutes     The  [employer] Cures Act makes medical notes like these available to patients in the interest of transparency. Please be advised this is a medical document. Medical documents are intended to carry relevant information, facts as evident, and the clinical opinion of the practitioner. The medical note is intended as peer to peer communication and may appear blunt or direct. It is written in medical language and may contain abbreviations or verbiage that are unfamiliar.

## 2024-06-04 NOTE — TELEPHONE ENCOUNTER
JUN 6 2024 07:30 AM - XR MYELOGRAM WITH SEDATION / ANESTHESIA  [facility] X-ray -  XR RM2 ([facility] FLUOROSCOPY);  RADIOLOGIST SPEC IR XR      JUN 6 2024 08:30 AM - CT MYELOGRAM WITH SEDATION  [facility] CT -  CT MAIN RM3; PERIOP RADIOLOGY RM       Spoke with patient who stated that:    -she was discharged from the hospital today and her PCP clearance appointment was canceled.  She is concerned that she will not be able to see Dr. Clifford before 6/6 and would like to know if her myeleogram will go as planned.     Called [facility] Nursing to discuss above. LMTCB.

## 2024-06-04 NOTE — TELEPHONE ENCOUNTER
Received a return call from Charito at [facility].  Per Charito, patient's case will be reviewed by anesthesia and we will receive feedback regarding CT myelogram and if patient is safe to undergo anesthesia on 6/6/24 given recent hospitalization for asthma/COPD exacerbation.     Nursing thanked Charito for the call and the discussion.     Called patient to inform her that her chart will be sent to be reviewed by anesthesia and [facility] staff will get back to her with their determination.     Patient acknowledged and thanked Nursing for the call.

## 2024-06-04 NOTE — TELEPHONE ENCOUNTER
Verified Name and     Patient is schedule to have Myelogram on 24 She was just discharge from the Hospital due to severe asthma Attack, Had appointment today with PCP for H&P but was told appointment was cancelled and she was told to call the office to see if the  Myelogram was still happening .
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
## 2024-06-04 NOTE — PROGRESS NOTES
NURSING ADMISSION NOTE      Patient admitted via Cart  Oriented to room. 507  Safety precautions initiated.  Bed in low position.  Call light in reach.      Pt Aox4. O2 WNL on RA, IV steroids, NSR on tele, EP cardiac. Continent x2, chronic constipation per pt. C/o HA and pain in Left hip/groin. PRN meds. Pt updated on poc. Call light within reach, safety precautions in place. POC continues.

## 2024-06-04 NOTE — PHYSICAL THERAPY NOTE
PHYSICAL THERAPY EVALUATION - INPATIENT     Room Number: 507/507-A  Evaluation Date: 6/4/2024  Type of Evaluation: Initial  Physician Order: PT Eval and Treat    Presenting Problem: Dyspnea  Co-Morbidities : asthma, Lyme disease, seizure disorder, thyroid disease  Reason for Therapy: Mobility Dysfunction and Discharge Planning    PHYSICAL THERAPY ASSESSMENT   Patient is currently functioning at baseline with bed mobility, transfers, and gait.  Prior to admission, patient's baseline is IND.  At this time pt was observed to be at Mod I level with mobility and IND with Transfer. Pt is observed to be near baseline and has no IP PT goals.     Patient will benefit from continued skilled PT Services at discharge to promote functional independence in home.  Anticipate patient will return home with home health PT.    PLAN              Patient has met all skilled IPPT goals at this time. Patient will be discharged from Physical Therapy services.  Please re-order if a new functional limitation presents during this admission.       PHYSICAL THERAPY MEDICAL/SOCIAL HISTORY  History related to current admission: Patient is a 79 year old female admitted on 6/3/2024 from Home for  Dyspnea.        HOME SITUATION  Type of Home: [facility]   Home Layout: One level                Lives With: Alone;Caregiver part-time (3 hrs/ 3 times a week)  Drives: No  Patient Owned Equipment: Rolling walker;Wheelchair (Transport Wheelchair)  Patient Regularly Uses: Reading glasses    Prior Level of Miller: Pt reports that she lives alone. Pt is IND with ADLs and IND/MOD I with ambulation. Pt sometimes uses the RW when she has days that she is SOB. Pt has a transport wheelchair and reports on bad days she wheels herself around. Pt continues to ambulate to [employer] but at times finds herself to fatigued to walk back home or requires extra time to get herself home. Pt has no history of falls    SUBJECTIVE  \"I feel like I can't  breath\"      OBJECTIVE  Precautions: None  Fall Risk: Standard fall risk    WEIGHT BEARING RESTRICTION  Weight Bearing Restriction: None                PAIN ASSESSMENT  Ratin  Location: Pt reported no pain       COGNITION  Overall Cognitive Status:  WFL - within functional limits    RANGE OF MOTION AND STRENGTH ASSESSMENT  Upper extremity ROM and strength are within functional limits     Lower extremity ROM is within functional limits     Lower extremity strength is within functional limits       BALANCE  Static Sitting: Good  Dynamic Sitting: Good  Static Standing: Good  Dynamic Standing: Good    ADDITIONAL TESTS                                    ACTIVITY TOLERANCE                         O2 WALK  Oxygen Therapy  SPO2% on Room Air at Rest: 97  SPO2% Ambulation on Room Air: 98    NEUROLOGICAL FINDINGS                        AM-PAC '6-Clicks' INPATIENT SHORT FORM - BASIC MOBILITY  How much difficulty does the patient currently have...  Patient Difficulty: Turning over in bed (including adjusting bedclothes, sheets and blankets)?: A Little   Patient Difficulty: Sitting down on and standing up from a chair with arms (e.g., wheelchair, bedside commode, etc.): A Little   Patient Difficulty: Moving from lying on back to sitting on the side of the bed?: A Little   How much help from another person does the patient currently need...   Help from Another: Moving to and from a bed to a chair (including a wheelchair)?: A Little   Help from Another: Need to walk in hospital room?: A Little   Help from Another: Climbing 3-5 steps with a railing?: A Little       AM-PAC Score:  Raw Score: 18   Approx Degree of Impairment: 46.58%   Standardized Score (AM-PAC Scale): 43.63   CMS Modifier (G-Code): CK    FUNCTIONAL ABILITY STATUS  Gait Assessment   Functional Mobility/Gait Assessment  Gait Assistance: Supervision  Distance (ft): 120  Assistive Device: Rolling walker  Pattern: Within Functional Limits    Skilled Therapy Provided      Bed Mobility:  Rolling: IND  Supine to sit: IND   Sit to supine: IND     Transfer Mobility:  Sit to stand: IND   Stand to sit: IND   Gait = Sup 120ft using RW. Pt eventually was able to ambulate without RW 10ft no assisted device.    Therapist's Comments:Pt was educated on energy conservation techniques when feeling of SOB which includes use of assisted device or splitting tasks to small activities. During ambulation pt was constantly at 98% however pt reported a 7-8/10 on dyspnea  scale.  During gait pt was observed to have a conversation  with no observation of increase work of breathing.     Exercise/Education Provided:  Bed mobility  Body mechanics  Gait training  Transfer training    Patient End of Session: Up in chair;In bed;Needs met;Call light within reach;All patient questions and concerns addressed;Alarm set;Family present      Patient Evaluation Complexity Level:  History Moderate - 1 or 2 personal factors and/or co-morbidities   Examination of body systems Low - addressing 1-2 elements   Clinical Presentation Low - Stable   Clinical Decision Making Low - Stable       PT Session Time: 23 minutes  Therapeutic Activity: 15 minutes

## 2024-06-04 NOTE — CM/SW NOTE
Anticipated therapy need: Home with Home Healthcare  Attempted to meet with patient but she was on the phone, will try again later.  Sent referral in aidin. Patient has history with [facility].    SW/CM to remain available for support and/or discharge planning.    BRIAN Chavez  Discharge Planner  [phone]

## 2024-06-04 NOTE — DISCHARGE INSTRUCTIONS
Sometimes managing your health at home requires assistance.  The [facility] team has recognized your preference to use [facility].  They can be reached by phone at [phone].  The fax number for your reference is [phone].  A representative from the home health agency will contact you or your family to schedule your first visit.

## 2024-06-04 NOTE — ED QUICK NOTES
Orders for admission, patient is aware of plan and ready to go upstairs. Any questions, please call ED RN chandra at extension [phone].     Patient Covid vaccination status: Unvaccinated     COVID Test Ordered in ED: SARS-CoV-2/Flu A and B/RSV by PCR (GeneXpert)    COVID Suspicion at Admission: N/A    Running Infusions:  None    Mental Status/LOC at time of transport: A&Ox4    Other pertinent information: steroids, breathing txs  CIWA score: N/A   NIH score:  N/A        
Patient able to walk around room, O2 sats remained at 99% on monitor.  
Rounding Completed    Plan of Care reviewed. Waiting for clean room assignment.  Elimination needs assessed.  Patient sleeping on stretcher.     Bed is locked and in lowest position. Call light within reach.  
14

## 2024-06-04 NOTE — PLAN OF CARE
NURSING DISCHARGE NOTE    Discharged Home via Wheelchair.  Accompanied by RN  Belongings Taken by patient/family.  PIV and telemetry removed. Discharge instructions provided to patient.

## 2024-06-04 NOTE — TELEPHONE ENCOUNTER
If symptoms persist tomorrow, I recommend in-office evaluation. She is to contact the office with any drainage/discharge, or significant pain.
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
## 2024-06-04 NOTE — HOME CARE LIAISON
[facility] Spoke w/ patient who is agreeable with [facility]. Contact information placed on AVS.

## 2024-06-05 ENCOUNTER — PATIENT OUTREACH (OUTPATIENT)
Dept: CASE MANAGEMENT | Age: 79
End: 2024-06-05

## 2024-06-05 ENCOUNTER — TELEPHONE (OUTPATIENT)
Dept: INTERNAL MEDICINE CLINIC | Facility: CLINIC | Age: 79
End: 2024-06-05

## 2024-06-05 DIAGNOSIS — I73.00 RAYNAUD'S DISEASE WITHOUT GANGRENE: ICD-10-CM

## 2024-06-05 DIAGNOSIS — J45.40 MODERATE PERSISTENT ASTHMA WITHOUT COMPLICATION (HCC): ICD-10-CM

## 2024-06-05 DIAGNOSIS — M50.30 DDD (DEGENERATIVE DISC DISEASE), CERVICAL: ICD-10-CM

## 2024-06-05 DIAGNOSIS — J44.89 ASTHMA WITH COPD (CHRONIC OBSTRUCTIVE PULMONARY DISEASE) (HCC): ICD-10-CM

## 2024-06-05 DIAGNOSIS — E06.3 HASHIMOTO'S DISEASE: ICD-10-CM

## 2024-06-05 DIAGNOSIS — F41.9 ANXIETY: ICD-10-CM

## 2024-06-05 DIAGNOSIS — R06.00 DYSPNEA, UNSPECIFIED TYPE: ICD-10-CM

## 2024-06-05 DIAGNOSIS — Z02.9 ENCOUNTERS FOR UNSPECIFIED ADMINISTRATIVE PURPOSE: Primary | ICD-10-CM

## 2024-06-05 DIAGNOSIS — J44.89 ASTHMA WITH COPD (CHRONIC OBSTRUCTIVE PULMONARY DISEASE) (HCC): Primary | ICD-10-CM

## 2024-06-05 PROCEDURE — 1111F DSCHRG MED/CURRENT MED MERGE: CPT

## 2024-06-05 PROCEDURE — 1159F MED LIST DOCD IN RCRD: CPT

## 2024-06-05 NOTE — TELEPHONE ENCOUNTER
Will discuss at OV 6/6    Future Appointments   Date Time Provider Department Center   6/6/2024  1:30 PM Sabina Haywood APRN [facility]

## 2024-06-05 NOTE — PROGRESS NOTES
Initial Post Discharge Follow Up   Discharge Date: 6/4/24  Contact Date: 6/5/2024    Consent Verification:  Assessment Completed With: Patient  HIPAA Verified?  Yes    Discharge Dx:   Asthma/COPD exacerabtion  Cystif Extradual lesion of lumbar spine  Hypothyroid    General:   How have you been since your discharge from the hospital? NCM spoke with pt state she is feeling very weak and reports shortness of breath, no chest pain. Pt denies any fevers, nausea, vomiting, or any other symptoms. Pt states [facility] visited today.   Do you have any pain since discharge?  No    How well was your pain managed while in the hospital?   On a scale of 1-5   1- Very Poor and 5- Very well   5  When you were leaving the hospital were your discharge instructions reviewed with you? Yes  How well were your discharge instructions explained to you?   On a scale of 1-5   1- Very Poor and 5- Very well   5  Do you have any questions about your discharge instructions?  No  Before leaving the hospital was your diagnoses explained to you? Yes  Do you have any questions about your diagnoses? No  Are you able to perform normal daily activities of living as you have prior to your hospital stay (dressing, bathing, ambulating to the bathroom, etc)? yes  (NCM) Was patient given a different diet per AVS? no      Medications: Reviewed medication list.  Medications are up to date.  Current Outpatient Medications   Medication Sig Dispense Refill    predniSONE 10 MG Oral Tab Take 2 tablets (20 mg total) by mouth daily for 4 days. 8 tablet 0    naproxen 500 MG Oral Tab Take 1 tablet (500 mg total) by mouth 2 (two) times daily as needed (for pain).      SYMBICORT 160-4.5 MCG/ACT Inhalation Aerosol Inhale 2 puffs into the lungs 2 (two) times daily.      Flaxseed, Linseed, (FLAXSEED OIL OR) Take by mouth.      SYNTHROID 25 MCG Oral Tab Take 1 tablet of 25 mcg on Monday-Friday Take 2 tablets of 25 mcg (total 50 mcg) on Saturday and Sunday 90 tablet 1    albuterol  108 (90 Base) MCG/ACT Inhalation Aero Soln Inhale 2 puffs into the lungs every 6 (six) hours as needed for Wheezing. 1 each 1    PATIENT SUPPLIED MEDICATION daily. Gallbladder formula, super colon cleanse      fluticasone furoate-vilanterol (BREO ELLIPTA) 100-25 MCG/ACT Inhalation Aerosol Powder, Breath Activated Inhale 1 puff into the lungs daily. (Patient taking differently: Inhale 1 puff into the lungs as needed.) 1 each 5    Cholecalciferol (VITAMIN D) 50 MCG (2000 UT) Oral Cap Take by mouth.      Turmeric (QC TUMERIC COMPLEX OR) Take by mouth.      vitamin B-12 50 MCG Oral Tab Take 1 tablet (50 mcg total) by mouth daily.      NON FORMULARY quersetin (Patient not taking: Reported on 5/29/2024)      Respiratory Therapy Supplies (NEBULIZER/TUBING/MOUTHPIECE) Does not apply Kit Use as directed. 1 each 0    COLLAGEN OR Take by mouth.      Ascorbic Acid (VITAMIN C) 100 MG Oral Tab Take 1 tablet (100 mg total) by mouth daily.      Calcium-Magnesium (GABRIELLA-MAG OR) Take 600 mg by mouth in the morning and 600 mg before bedtime.       Were there any changes to your current medication(s) noted on the AVS? Yes  If so, were these medication changes discussed with you prior to leaving the hospital? Yes  If a new medication was prescribed:    Was the new medication's purpose & side effects reviewed? Yes  Do you have any questions about your new medication? No  Did you  your discharge medications when you left the hospital? Yes  Let's go over your medications together to make sure we are not missing anything. Medications Reviewed  Are there any reasons that keep you from taking your medication as prescribed? No  Are you having any concerns with constipation? No      Discharge medications reviewed/discussed/and reconciled against outpatient medications with patient.  Any changes or updates to medications sent to PCP.  Patient Acknowledged     Referrals/orders at D/C:  Referrals/orders placed at D/C? yes  What services:    Home health and PT   (If HH was ordered) Has HH been set up?  Yes    If Yes: With Whom: [facility]    DME ordered at D/C? No      Discharge orders, AVS reviewed and discussed with patient. Any changes or updates to orders sent to PCP.  Patient Acknowledged      SDOH:   Transportation Needs: No Transportation Needs (6/4/2024)    Transportation Needs     Lack of Transportation: No     Car Seat: Not on file       Financial Resource Strain: Low Risk  (1/22/2024)    Financial Resource Strain     Difficulty of Paying Living Expenses: Not very hard     Med Affordability: No     COPD:  Have you participated in a pulmonary rehab program?   no   Would you like more information?  no  We reviewed your medications/inhalers, how often are you using your inhaler? As prescribed   Are you currently on oxygen?no     Are you familiar with the signs and symptoms of worsening COPD?Yes       Who do you call with worsening COPD symptoms?PCP   Do you know when to call with COPD symptoms? Yes   Do you have any of the following potential risk factors for COPD in your home environment?  Primary or secondary tobacco smoke   no  Occupational dusts and chemicals organic and inorganic    no  Indoor air pollution from heating and cooking with poor ventilation   no    Mold      no    Pets        no    Extreme heat no            Follow up appointments:      Your appointments       Date & Time Appointment Department (Center)    Jun 06, 2024 1:30 PM CDT Exam - Established with Sabina Haywood APRN [facility], [address] [place] [address] ([address]/[facility])        Jun 27, 2024 6:30 AM CDT PreProcedure Lab with [facility] ([facility])    This lab appointment is required prior to your scheduled procedure.  It is very important that you arrive on time for this lab appointment.     If you need to cancel, please call [phone].        Jun 27, 2024 7:30 AM  CDT XR MYELOGRAM WITH SEDATION / ANESTHESIA with EH XR RM2 (MISC FLUOROSCOPY) [facility] X-ray ([facility])    Please arrive 30 minutes prior to your scheduled appointment time.    Prior to your arrival:    A nurse from Pre-Admission Testing will be calling you to do a brief Health History Screen over the phone and give you any further instructions if needed. This nurse will also be able to assist you should any additional testing be required before your procedures.    If you suspect you may be pregnant, please consult with your physician prior to your exam.    Please make sure an adult is available to drive you home and it is recommended that an adult stays overnight with you after the procedure.       A complete history and physical is required within 30 days of your scheduled procedure.    Day of procedure:    After you register, you will be escorted directly to the radiology department.     Please arrive on time for your lab appointment (60 minutes prior to the scheduled procedure time).    Dietary Instructions:    Please have nothing to eat or drink for 8 hours prior to your procedure except for your usual medication with a small amount of water.    The estimated duration of your visit could take 6 hours, which includes recovery time after the procedure.      Jun 27, 2024 8:00 AM CDT XR MYELOGRAM WITH SEDATION / ANESTHESIA with  RADIOLOGIST SPEC IR XR [facility] X-ray ([facility])    Please arrive 30 minutes prior to your scheduled appointment time.    Prior to your arrival:    A nurse from Pre-Admission Testing will be calling you to do a brief Health History Screen over the phone and give you any further instructions if needed. This nurse will also be able to assist you should any additional testing be required before your procedures.    If you suspect you may be pregnant, please consult with your physician prior to your exam.    Please make sure  an adult is available to drive you home and it is recommended that an adult stays overnight with you after the procedure.       A complete history and physical is required within 30 days of your scheduled procedure.    Day of procedure:    After you register, you will be escorted directly to the radiology department.     Please arrive on time for your lab appointment (60 minutes prior to the scheduled procedure time).    Dietary Instructions:    Please have nothing to eat or drink for 8 hours prior to your procedure except for your usual medication with a small amount of water.    The estimated duration of your visit could take 6 hours, which includes recovery time after the procedure.      Jun 27, 2024 8:30 AM CDT CT MYELOGRAM WITH SEDATION with  CT MAIN [address] ([facility])    Please arrive 30 minutes prior to your scheduled appointment time.    Prior to your arrival:  A nurse from Pre-Admission Testing will be calling you to do a brief Health History Screen over the phone and give you any further instructions if needed. This nurse will also be able to assist you should any additional testing be required before your procedures.    If you suspect you may be pregnant, please consult with your physician prior to your exam.    Please make sure an adult is available to drive you home and it is recommended that an adult stays overnight with you after the procedure.       A complete history and physical is required within 30 days of your scheduled procedure.    Day of procedure:  After you register, you will be escorted directly to the radiology department.     Please arrive on time for your lab appointment (60 minutes prior to the scheduled procedure time).    Dietary Instructions:  Please have nothing to eat or drink for 8 hours prior to your procedure except for your usual medication with a small amount of water.    The estimated duration of your visit could take 6 hours,  which includes recovery time after the procedure.      Jun 27, 2024 9:00 AM CDT CT MYELOGRAM WITH SEDATION with PERIOP RADIOLOGY [facility] Perioperative Service ([facility])    Please arrive 30 minutes prior to your scheduled appointment time.    Prior to your arrival:  A nurse from Pre-Admission Testing will be calling you to do a brief Health History Screen over the phone and give you any further instructions if needed. This nurse will also be able to assist you should any additional testing be required before your procedures.    If you suspect you may be pregnant, please consult with your physician prior to your exam.    Please make sure an adult is available to drive you home and it is recommended that an adult stays overnight with you after the procedure.       A complete history and physical is required within 30 days of your scheduled procedure.    Day of procedure:  After you register, you will be escorted directly to the radiology department.     Please arrive on time for your lab appointment (60 minutes prior to the scheduled procedure time).    Dietary Instructions:  Please have nothing to eat or drink for 8 hours prior to your procedure except for your usual medication with a small amount of water.    The estimated duration of your visit could take 6 hours, which includes recovery time after the procedure.      Nov 15, 2024 10:30 AM CST Exam - Established with Kathi Sow DO [facility] ([facility])              [facility]  [address] [address]  [phone] [facility] [address]  [facility]  [address] [address]  [phone] [facility] Perioperative Service  [facility]  [address] [address]  [phone]     [facility] X-ray  [facility]  [address]  [facility] [address]  [phone] [facility], [address] [place] [address]  [facility] [place]  [address]  [facility] [id number] [phone] [facility]  [address] [facility]  [facility] [address]  [phone]            TCC  Was TCC ordered: Yes  Was TCC scheduled: No, Explain: pt prefers to follow up with PCP office.       PCP (If no TCC appointment)  Does patient already have a PCP appointment scheduled? Yes  NCM Confirmed PCP office TCM appointment with patient       Specialist    Does the patient have any other follow up appointment(s) needing to be scheduled? No      Is there any reason as to why you cannot make your appointment(s)?  No     Needs post D/C:   Now that you are home, are there any needs or concerns you need addressed before your next visit with your PCP?  (DME, meds, questions, etc.): No    Interventions by NCM:   All discharge instructions reviewed with the patient. Reviewed when to call MD vs when to call 911 or go the ED. Educated patient on the importance of taking all meds as prescribed as well as close f/u with PCP/specialists. Pt verbalized understanding and will contact the office with any further questions or concerns. Patient denies fevers, chills, nausea, vomiting, shortness of breath, chest pain, or any other symptoms at this time.  NCM provided contact information for any further questions/concerns. Patient verbalized understanding and agreeable.       Overall Rating:   How would you rate the care you received while in the hospital? good    CCM referral placed:    No    BOOK BY DATE: 6/18/24

## 2024-06-05 NOTE — TELEPHONE ENCOUNTER
Gamaliel from [facility] [phone](home health agency name) called regarding Arianna Colin for an update: patient starting home health services today for nursing and Physical Therapy.  No call back required .

## 2024-06-05 NOTE — PROGRESS NOTES
Patient called needing assistance with transportation for tomorrows appointment   Future Appointments   Date Time Provider Department Center   6/6/2024  1:30 PM Sabina Haywood APRN [facility]   6/27/2024  6:30 AM [facility] LAB [facility]   6/27/2024  7:30 AM EH XR RM2 (MISC FLUOROSCOPY) EH XRAY [facility]   6/27/2024  8:30 AM EH CT MAIN RM3 EH CT [facility]   11/15/2024 10:30 AM Kathi Sow DO [id number] [place]     Called [employer] [facility] - patient needs to register and it can takes 3-5 days to process application  [employer] transportation services requires a 3 day notice      Patient has been notified.  Will follow up with her on Friday for Monthly call     Total time -  10 min  Total Monthly time-  10  min

## 2024-06-05 NOTE — TELEPHONE ENCOUNTER
Called and spoke to pt. She states the nipple redness is improving. She denies any discharge. States she has been having pain under left arm for several months now. She states the pain comes and goes. She is over due for her mammogram.     Pt also states she needs to have a myelogram, but she was instructed to have HFU before she could have myelogram.    BD - would you be able to see pt tomorrow for both HFU and breast problem?

## 2024-06-05 NOTE — PAYOR COMM NOTE
--------------  DISCHARGE REVIEW    Payor: [facility]ASS[facility]RAF [facility] [place]  Subscriber #:  [id number]  Authorization Number: [id number]    Admit date: N/A  Admit time:  N/A  Discharge Date: 2024 12:55 PM     Admitting Physician: Debra Kan MD  Attending Physician:  Ivory att. providers found  Primary Care Physician: Micheal Clifford MD           [place] [facility]  DISCHARGE SUMMARY     Arianna Colin Patient Status:  Observation    1945 MRN [id number]   Location [facility] 5NW-A Attending Joao Ndiaye MD   Hosp Day # 0 PCP Micheal Clifford MD     Date of Admission: 6/3/2024  Date of Discharge:   2024      Discharge Disposition: Home or Self Care    Discharge Diagnosis:  Asthma/COPD exacerabtion  Cystif Extradual lesion of lumbar spine  Hypothyroid    History of Present Illness: Arianna Colin is a 79 year old female with medical history of asthma and COPD, fibromyalgia, thyroid disease and a recently diagnosed cystic extradural lesion of the lumbar spine who comes to the ER with complaints of shortness of breath and cough for the past 2 weeks.  She has been taking albuterol at home without any improvement.  She states that her shortness of breath is now worse on exertion and she therefore came to the ER for further evaluation.  She was given a DuoNeb treatment as well as steroids with some improvement in her shortness of breath.  She will be admitted for further care     Brief Synopsis: Pt was admitted and given nebs and steroids with improvement of her symptoms. She will have script for burst of steroid and follow up with PCP for outpatient PFTs testing.     Lace+ Score: 75  59-90 High Risk  29-58 Medium Risk  0-28   Low Risk         Discharge Medication List:     Discharge Medications        START taking these medications        Instructions Prescription details   predniSONE 10 MG Tabs  Commonly known as: Deltasone      Take 2 tablets (20 mg total) by mouth daily for 4 days.   Stop taking on:   2024  Quantity: 8 tablet  Refills: 0            CHANGE how you take these medications        Instructions Prescription details   naproxen 500 MG Tabs  Commonly known as: Naprosyn  What changed: Another medication with the same name was removed. Continue taking this medication, and follow the directions you see here.      Take 1 tablet (500 mg total) by mouth 2 (two) times daily as needed (for pain).   Refills: 0            CONTINUE taking these medications        Instructions Prescription details   albuterol 108 (90 Base) MCG/ACT Aers  Commonly known as: Ventolin HFA      Inhale 2 puffs into the lungs every 6 (six) hours as needed for Wheezing.   Quantity: 1 each  Refills: 1     GABRIELLA-MAG OR      Take 600 mg by mouth in the morning and 600 mg before bedtime.   Refills: 0     COLLAGEN OR      Take by mouth.   Refills: 0     FLAXSEED OIL OR      Take by mouth.   Refills: 0     fluticasone furoate-vilanterol 100-25 MCG/ACT Aepb  Commonly known as: Breo Ellipta      Inhale 1 puff into the lungs daily.   Quantity: 1 each  Refills: 5     Nebulizer/Tubing/Mouthpiece Kit      Use as directed.   Quantity: 1 each  Refills: 0     PATIENT SUPPLIED MEDICATION      daily. Gallbladder formula, super colon cleanse   Refills: 0     QC TUMERIC COMPLEX OR      Take by mouth.   Refills: 0     Symbicort 160-4.5 MCG/ACT Aero  Generic drug: Budesonide-Formoterol Fumarate      Inhale 2 puffs into the lungs 2 (two) times daily.   Refills: 0     Synthroid 25 MCG Tabs  Generic drug: levothyroxine      Take 1 tablet of 25 mcg on Monday-Friday Take 2 tablets of 25 mcg (total 50 mcg) on Saturday and Sunday   Quantity: 90 tablet  Refills: 1     vitamin B-12 50 MCG Tabs  Commonly known as: CYANOCOBALAMIN      Take 1 tablet (50 mcg total) by mouth daily.   Refills: 0     vitamin C 100 MG Tabs      Take 1 tablet (100 mg total) by mouth daily.   Refills: 0     Vitamin D 50 MCG (2000 UT) Caps      Take by mouth.   Refills: 0            STOP taking these  medications      Contrast Allergy PreMed Pack 3 x 50 MG & 1 x 50 MG Kit  Generic drug: predniSONE & diphenhydrAMINE               ASK your doctor about these medications        Instructions Prescription details   NON FORMULARY      quersetin   Refills: 0               Where to Get Your Medications        These medications were sent to [id number] - [place], [place] - [address], [phone], [phone]  [address], [facility] [address]      Phone: [phone]   predniSONE 10 MG Tabs         [place]    Follow-up appointment:   No follow-up provider specified.  Appointments for Next 30 Days 6/4/2024 - 7/4/2024        Date Arrival Time Visit Type Length Department Provider     6/4/2024  3:00 PM  PRESURGICAL VISIT [2854] 40 min [facility], [address] [place] [address] Micheal Clifford MD    Patient Instructions:         Location Instructions:     Masks are optional for all patients and visitors, unless otherwise indicated.               6/6/2024  6:30 AM  ADULT LAB [2063] 15 min Levine, Susan. \Hospital Has a New Name and Outlook.\"" [facility]    Patient Instructions:         Location Instructions:     Your appointment is scheduled at [facility].&nbsp; The address is [address] [place] [id number].  To reach Outpatient Registration, park in the South Parking Garage and enter the double doors located on the ground floor. Proceed to the lobby past the Information Desk to Outpatient Registration.  [facility] Hours of Operation:  MONDAY-FRIDAY 6:00AM - 8:00PM SATURDAY 6:00AM - 3:00PM SUNDAY 8:AM-12:00PM  Masks are optional for all patients and visitors, unless otherwise indicated.               6/6/2024  7:30 AM  EDW XR SEDATE ANES MYELOGRAM [4028] 60 min [facility] X-ray  XR RM2 (MISC FLUOROSCOPY)     6/6/2024  8:00 AM  EDW XR SEDATE ANES MYELOGRAM [4028] 30 min [facility] X-ray   RADIOLOGIST SPEC IR XR    Patient Instructions:     Please arrive 30 minutes prior to your scheduled appointment time.    Prior to your arrival:    A nurse from Pre-Admission Testing will be calling you to do a brief Health History Screen over the phone and give you any further instructions if needed. This nurse will also be able to assist you should any additional testing be required before your procedures.    If you suspect you may be pregnant, please consult with your physician prior to your exam.    Please make sure an adult is available to drive you home and it is recommended that an adult stays overnight with you after the procedure.       A complete history and physical is required within 30 days of your scheduled procedure.    Day of procedure:    After you register, you will be escorted directly to the radiology department.     Please arrive on time for your lab appointment (60 minutes prior to the scheduled procedure time).    Dietary Instructions:    Please have nothing to eat or drink for 8 hours prior to your procedure except for your usual medication with a small amount of water.    The estimated duration of your visit could take 6 hours, which includes recovery time after the procedure.      Location Instructions:     Your appointment will be at [facility] located at [address] [place] [address].&nbsp; To reach Outpatient Registration, park in the South Parking Garage and enter the double doors located on the ground floor.&nbsp; Proceed to the lobby past the Information Desk to Outpatient Registration. The phone number for this location is [phone].  If you suspect you may be pregnant please consult with your physician prior to your exam. If you have a continuous glucose monitor you may be asked to remove it during the exam.  Because of the nature of the Emergency Department, please be advised of the possibility your appointment may be delayed.  Please bring your insurance  card and photo ID. You will also need to bring your doctor's order unless your physician's office submitted the order electronically or faxed the order. Without the order, your test may be delayed or postponed.  Children: Children under the age of 12 must have another adult caregiver with them.&nbsp; Please do not bring your child/children without a caregiver.&nbsp; Because of the highly sensitive equipment and privacy of all our patients, children will not be permitted in the exam rooms, unless otherwise noted and in accordance with departmental policy.  PATIENT RESPONSIBILITY ESTIMATE  - (Estimate) We will provide you with your estimated remaining deductible and coinsurance balance for your services at the time of check in.  - (Payment) Please be aware that you may be asked for payment at the time of service.  Masks are optional for all patients and visitors, unless otherwise indicated.               6/6/2024  8:30 AM  EDW CT SEDATE ANES MYELOGRAM [4029] 30 min [facility] CT EH CT MAIN RM3     6/6/2024  9:00 AM  EDW CT SEDATE ANES MYELOGRAM [4029] 90 min [facility] Perioperative Service PERIOP RADIOLOGY RM    Patient Instructions:     Please arrive 30 minutes prior to your scheduled appointment time.    Prior to your arrival:  A nurse from Pre-Admission Testing will be calling you to do a brief Health History Screen over the phone and give you any further instructions if needed. This nurse will also be able to assist you should any additional testing be required before your procedures.    If you suspect you may be pregnant, please consult with your physician prior to your exam.    Please make sure an adult is available to drive you home and it is recommended that an adult stays overnight with you after the procedure.       A complete history and physical is required within 30 days of your scheduled procedure.    Day of procedure:  After you register, you will be escorted directly to the radiology  department.     Please arrive on time for your lab appointment (60 minutes prior to the scheduled procedure time).    Dietary Instructions:  Please have nothing to eat or drink for 8 hours prior to your procedure except for your usual medication with a small amount of water.    The estimated duration of your visit could take 6 hours, which includes recovery time after the procedure.      Location Instructions:     Your appointment will be at [facility] located at [address] [place] [address].&nbsp; To reach Outpatient Registration, turn onto [address] from [facility].&nbsp; You may park or  in the South Parking Garage.&nbsp; Enter the double doors located on the ground floor and proceed to the lobby past the Information Desk to Outpatient Registration.&nbsp; The phone number for this location is [phone].  If you suspect you may be pregnant please consult with your physician prior to your exam.&nbsp; If you have a continuous glucose monitor you will be asked to remove it during the exam.  Please bring your insurance card and photo ID. You will also need to bring your doctor's order unless your physician's office submitted the order electronically or faxed the order. Without the order, your test may be delayed or postponed.&nbsp; Certain health screening tests may not require an order.  Because of the nature of the Emergency Department, please be advised of the possibility that your appointment may be delayed.  Children: Children under the age of 12 must have another adult caregiver with them.&nbsp; Please do not bring your child/children without a caregiver.&nbsp; Because of the highly sensitive equipment and privacy of all our patients, children will not be permitted in the exam rooms, unless otherwise noted and in accordance with departmental policy.  PATIENT RESPONSIBILITY ESTIMATE  - (Estimate) We will provide you with your estimated remaining deductible and coinsurance  balance for your services at the time of check in.  - (Payment) Please be aware that you may be asked for payment at the time of service.  Masks are optional for all patients and visitors, unless otherwise indicated.                      Vital signs:  Temp:  [97.5 °F (36.4 °C)-98 °F (36.7 °C)] 97.8 °F (36.6 °C)  Pulse:  [60-76] 64  Resp:  [13-21] 21  BP: ()/(54-72) 91/55  SpO2:  [97 %-100 %] 100 %    Physical Exam:    General: No acute distress   Lungs: clear to auscultation  Cardiovascular: S1, S2  Abdomen: Soft      -----------------------------------------------------------------------------------------------  PATIENT DISCHARGE INSTRUCTIONS: See electronic chart    Joao Ndiaye MD    Total time spent on discharge plannin minutes     The  [employer] Cures Act makes medical notes like these available to patients in the interest of transparency. Please be advised this is a medical document. Medical documents are intended to carry relevant information, facts as evident, and the clinical opinion of the practitioner. The medical note is intended as peer to peer communication and may appear blunt or direct. It is written in medical language and may contain abbreviations or verbiage that are unfamiliar.       Electronically signed by Joao Ndiaye MD on 2024 10:08 AM         REVIEWER COMMENTS    MDM   79-year-old, history of asthma, Lyme disease, seizure disorder, thyroid disease, recently diagnosed cystic extradural lesion in the lumbar spine, now has been coughing for approximately 2 weeks, feels it is not improving with albuterol, was walking with a friend today who noticed her shortness of breath and convinced her to come in.  No fevers no productive cough.    Workup in ED is very reassuring with negative troponin, D-dimer, BNP, GEN expert.  Chest x-ray shows hyperinflation no other acute findings.  Patient was given a DuoNeb, still felt short of breath, was given IV Solu-Medrol and an  hour-long neb.  She says she feels better but still feels winded even lying still in the stretcher.  No active chest pain.  Patient ambulated without dropping O2 sat, but still subjectively feels started for air.  She will be admitted for observation.  Case discussed with Dr. Kan.     Admission disposition: 6/3/2024  9:01 PM      6/3/24    #Acute Asthma/COPD exacerbation  -steroids  -nebs  -inhalers  -o2 desat test  -Tele monitoring     #Cystic extradural lesion of lumbar spine  -continue OP followup      #Fibromyalgia     #Hypothyroidism  -resume PTA levothyroxine      Hospital Medications  potassium chloride (Klor-Con M20) tab 40 mEq (Completed)  ipratropium-albuterol (Duoneb) 0.5-2.5 (3) MG/3ML inhalation solution 3 mL (Completed)  methylPREDNISolone sodium succinate (Solu-MEDROL) injection 125 mg (Completed)  albuterol (Ventolin) (5 MG/ML) 0.5% nebulizer solution 10 mg (Completed)

## 2024-06-06 ENCOUNTER — APPOINTMENT (OUTPATIENT)
Dept: LAB | Facility: HOSPITAL | Age: 79
End: 2024-06-06
Attending: NURSE PRACTITIONER
Payer: MEDICARE

## 2024-06-06 ENCOUNTER — HOSPITAL ENCOUNTER (OUTPATIENT)
Dept: GENERAL RADIOLOGY | Facility: HOSPITAL | Age: 79
Discharge: HOME | End: 2024-06-06
Attending: NURSE PRACTITIONER
Payer: MEDICARE

## 2024-06-06 ENCOUNTER — HOSPITAL ENCOUNTER (OUTPATIENT)
Dept: CT IMAGING | Facility: HOSPITAL | Age: 79
Discharge: HOME | End: 2024-06-06
Attending: NURSE PRACTITIONER
Payer: MEDICARE

## 2024-06-06 ENCOUNTER — OFFICE VISIT (OUTPATIENT)
Dept: INTERNAL MEDICINE CLINIC | Facility: CLINIC | Age: 79
End: 2024-06-06
Payer: MEDICARE

## 2024-06-06 VITALS
BODY MASS INDEX: 22 KG/M2 | SYSTOLIC BLOOD PRESSURE: 110 MMHG | RESPIRATION RATE: 16 BRPM | TEMPERATURE: 97 F | HEART RATE: 68 BPM | DIASTOLIC BLOOD PRESSURE: 64 MMHG | OXYGEN SATURATION: 99 % | WEIGHT: 103.38 LBS

## 2024-06-06 DIAGNOSIS — J45.40 MODERATE PERSISTENT ASTHMA WITHOUT COMPLICATION (HCC): ICD-10-CM

## 2024-06-06 DIAGNOSIS — J44.89 ASTHMA WITH COPD (CHRONIC OBSTRUCTIVE PULMONARY DISEASE) (HCC): Chronic | ICD-10-CM

## 2024-06-06 DIAGNOSIS — G96.198 EPIDURAL MASS: ICD-10-CM

## 2024-06-06 DIAGNOSIS — E06.3 HASHIMOTO'S DISEASE: ICD-10-CM

## 2024-06-06 DIAGNOSIS — R06.00 DYSPNEA, UNSPECIFIED TYPE: Primary | ICD-10-CM

## 2024-06-06 DIAGNOSIS — N64.4 BREAST PAIN: ICD-10-CM

## 2024-06-06 PROCEDURE — 1159F MED LIST DOCD IN RCRD: CPT

## 2024-06-06 PROCEDURE — 1111F DSCHRG MED/CURRENT MED MERGE: CPT

## 2024-06-06 PROCEDURE — 99215 OFFICE O/P EST HI 40 MIN: CPT

## 2024-06-06 PROCEDURE — 1160F RVW MEDS BY RX/DR IN RCRD: CPT

## 2024-06-06 PROCEDURE — 3074F SYST BP LT 130 MM HG: CPT

## 2024-06-06 PROCEDURE — 3078F DIAST BP <80 MM HG: CPT
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
## 2024-06-07 ENCOUNTER — TELEPHONE (OUTPATIENT)
Facility: CLINIC | Age: 79
End: 2024-06-07

## 2024-06-07 NOTE — TELEPHONE ENCOUNTER
Pt called PCP office to schedule OV with MD Gunnar.  RN informed pt PCP office cannot schedule appt with specialty providers and provided pt with phone number to call MD Gunnar's office.  Pt is anxious to get scheduled with MD Gunnar as she has an upcoming procedure with cardiologist.  Routing to MD Gunnar office, please call pt.

## 2024-06-07 NOTE — TELEPHONE ENCOUNTER
Patient has been in the hospital for chf.  Cardiologist is wanting to do a procedure and she wants Dr. Maher's opinion on it

## 2024-06-07 NOTE — TELEPHONE ENCOUNTER
Received message that stated:\"patient has been in the hospital for chf.  Cardiologist is wanting to do a procedure and she wants Dr. Clifton's opinion on it\"  Called patient and spoke with patient.  She states that she was discharged from the hospital Wednesday this week.   She states she is scheduled for a myelogram 06/27 and needs pulmonary clearance from dr clifton.  She also states her COPD and asthma \"continue to be a problem\"     Appointment made for dr clifton for 06/11/24 @ 11 am.  Patient verbalized understanding and had no questions for staff.

## 2024-06-11 ENCOUNTER — OFFICE VISIT (OUTPATIENT)
Facility: CLINIC | Age: 79
End: 2024-06-11
Payer: MEDICARE

## 2024-06-11 VITALS
HEIGHT: 57 IN | SYSTOLIC BLOOD PRESSURE: 110 MMHG | RESPIRATION RATE: 16 BRPM | HEART RATE: 76 BPM | OXYGEN SATURATION: 98 % | WEIGHT: 103.38 LBS | DIASTOLIC BLOOD PRESSURE: 60 MMHG | BODY MASS INDEX: 22.3 KG/M2

## 2024-06-11 DIAGNOSIS — J45.901 MODERATE ASTHMA WITH ACUTE EXACERBATION, UNSPECIFIED WHETHER PERSISTENT (HCC): Primary | ICD-10-CM

## 2024-06-11 PROCEDURE — 1159F MED LIST DOCD IN RCRD: CPT | Performed by: INTERNAL MEDICINE

## 2024-06-11 PROCEDURE — 1111F DSCHRG MED/CURRENT MED MERGE: CPT | Performed by: INTERNAL MEDICINE

## 2024-06-11 PROCEDURE — 1160F RVW MEDS BY RX/DR IN RCRD: CPT | Performed by: INTERNAL MEDICINE

## 2024-06-11 PROCEDURE — 3008F BODY MASS INDEX DOCD: CPT | Performed by: INTERNAL MEDICINE

## 2024-06-11 PROCEDURE — 3074F SYST BP LT 130 MM HG: CPT | Performed by: INTERNAL MEDICINE

## 2024-06-11 PROCEDURE — 99214 OFFICE O/P EST MOD 30 MIN: CPT | Performed by: INTERNAL MEDICINE

## 2024-06-11 PROCEDURE — 3078F DIAST BP <80 MM HG: CPT | Performed by: INTERNAL MEDICINE

## 2024-06-11 NOTE — PATIENT INSTRUCTIONS
Plan- continue Symbicort -one puff twice a day -- increase to 2 puffs twice a day if you get sick -- and for one week prior to procedure          - continue rescue inhaler -- albuterol - 2 puffs every 4 hours as needed           - ill talk to neurosurgery         - see me  In 3 months   -    Mackenzie Maher MD  Pulmonary Medicine  6/11/2024
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
## 2024-06-17 ENCOUNTER — HOSPITAL ENCOUNTER (OUTPATIENT)
Dept: MAMMOGRAPHY | Facility: HOSPITAL | Age: 79
Discharge: HOME | End: 2024-06-17
Attending: INTERNAL MEDICINE

## 2024-06-17 ENCOUNTER — HOSPITAL ENCOUNTER (OUTPATIENT)
Dept: MAMMOGRAPHY | Facility: HOSPITAL | Age: 79
Discharge: HOME | End: 2024-06-17

## 2024-06-17 ENCOUNTER — TELEPHONE (OUTPATIENT)
Facility: CLINIC | Age: 79
End: 2024-06-17

## 2024-06-17 DIAGNOSIS — N64.4 PAIN OF BOTH BREASTS: ICD-10-CM

## 2024-06-17 DIAGNOSIS — N63.20 MASS OF LEFT BREAST, UNSPECIFIED QUADRANT: Primary | ICD-10-CM

## 2024-06-17 DIAGNOSIS — N64.4 BREAST PAIN: ICD-10-CM

## 2024-06-17 PROCEDURE — 77066 DX MAMMO INCL CAD BI: CPT

## 2024-06-17 PROCEDURE — 77062 BREAST TOMOSYNTHESIS BI: CPT

## 2024-06-17 PROCEDURE — 77062 BREAST TOMOSYNTHESIS BI: CPT | Performed by: INTERNAL MEDICINE

## 2024-06-17 PROCEDURE — 76642 ULTRASOUND BREAST LIMITED: CPT | Performed by: INTERNAL MEDICINE

## 2024-06-17 PROCEDURE — 77066 DX MAMMO INCL CAD BI: CPT | Performed by: INTERNAL MEDICINE

## 2024-06-17 NOTE — TELEPHONE ENCOUNTER
Phone call to interventional radiology  Plan for anesthesia to assist with propofol and sedation given multiple allergies to local anesthetics  In addition patient will receive 13-hour prep for dye allergy

## 2024-06-17 NOTE — IMAGING NOTE
This Breast Care RN assisted Dr. Barr with recommendation for a left breast 2 site stereotactic biopsy for calcifications.  Procedure reviewed and all questions answered. Emotional and educational support given. On the day of the biopsy, pt instructed to take Tylenol 1000mg PO, eat a light meal & bring or wear a sports bra.  Post biopsy care also reviewed with pt to include NO lifting more than 5lbs, no exercising or housework (limit upper body movement) for 24-48 hrs post biopsy. Patient denies blood thinners, bleeding disorders, liver disease, and chemo. Patient is allergic to Lidocaine. Patient also has MCS or \"multi chemical sensitivity\"  The patient states that she has a list of medications at home that she cannot take.  Instructed to call this RN back with the list to determine what local anesthesia she can have. If there are no options, then Dr Barr is recommending a surgical consultation.  Pt verbalized understanding.  Our breast center schedulers will be calling to schedule an appt that is convenient for pt.

## 2024-06-18 ENCOUNTER — TELEPHONE (OUTPATIENT)
Dept: INTERNAL MEDICINE CLINIC | Facility: CLINIC | Age: 79
End: 2024-06-18

## 2024-06-18 ENCOUNTER — TELEPHONE (OUTPATIENT)
Dept: SURGERY | Facility: CLINIC | Age: 79
End: 2024-06-18

## 2024-06-18 NOTE — TELEPHONE ENCOUNTER
No documented arrival of any form for scooter authorization.    Routed to providers to see if they are aware of familiar about any paperwork

## 2024-06-18 NOTE — PROGRESS NOTES
Called patients pharmacy and Synthroid 25mcg will be delivered to patients home in 2-3 days.  Patient has been made aware.     Total time -  5 min  Total Monthly time-  43 min

## 2024-06-18 NOTE — PROGRESS NOTES
6/18/2024  Spoke to Arianna for [facility].      Updates to patient care team/ comments: Reviewed with patient     Patient reported changes in medications: None  Med Adherence  Comment: Taking as directed    Health Maintenance:  reviewed with patient.   Health Maintenance   Topic Date Due    Pneumococcal Vaccine: 65+ Years (1 of 2 - PCV) Never done    Zoster Vaccines (1 of 2) Never done    Colorectal Cancer Screening  01/01/2023    COVID-19 Vaccine (1 - 2023-24 season) Never done    Influenza Vaccine (Season Ended) 12/28/2024 (Originally 10/1/2024)    DEXA Scan  Completed    MA Annual Health Assessment  Completed    Annual Depression Screening  Completed    Fall Risk Screening (Annual)  Completed       Patient current concerns:   [place] ED on 5/30/24 for left hip pain and left knee pain: Patient reports that left hip pain is still bothering her, pain kept her up last night. Patient took some naproxen and it did help with pain. Applying heat helps.   Left knee pain is better. Pain is intermittent. States she does not know what to do about her leg pain, advised her to see Dr Clifford and have him evaluate her. Patient is agreeable with plan. She saw Dr Martinez  and he recommended physical therapy, but pending myelogram.     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 Son will take her to have procedure done.     [place] ED on 6/3/24 for Dyspnea: Patient states asthma still flares up. Patient reports she stays indoors/avoids going outside. Walking causes her to become fatigued, some shortness of breath and coughing. Patient did follow up with pulmonologist Dr Maher and was instructed to continue using Symbicort and Albuterol inhaler. States she has requested a scooter from Dr Gaspar and is still waiting on status of request.     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 Currently not taking Synthroid 25mcg, states she went to pharmacy and was told she had no more  refills.    Disp Refills Start   SYNTHROID 25 MCG Oral Tab 90 tablet 1 5/7/2024   I will follow up with her pharmacy.     Breast pain: patient complaining about bilateral breast pain and nipple redness, no discharge. Patient had a mammogram on 6/17/24 and and bilateral breast ultrasound. Will need a biopsy done on left breast. Patient waiting to hear when she is to have this done. She needs to have transportation arranged.         Goals/Action Plan:    Active goal from previous outreach:   Requested electric scooter. -   Still has issues with transportation to appointments.     Patient reported progress towards goals:  Ordered through [employer]. Awaiting response.             Patient Reported Barriers and Concerns: Needs refill on Synthroid 25mcg.        Plan for overcoming barriers: call pharmacy to inquire about refills- patient would like to have medication delivered.       Care managers interventions:   Continue to provide encouragement, support, and education for healthy coping and diagnosis.    Encouraged patient:   Self care: Take the time to do the things you love.   Nutrition:  Good nutrition helps us to maintain our weight, fight off infection, and help reduce the risk of developing other chronic issues.   Physical activity:  Physical activity is important to help maintain independence and improve quality of life.     Continued to provide education on how to better manage and cope with chronic conditions. Informed of the importance on reporting any new symptoms to prevent any health complications.       Appointments reviewed with patient.     Future Appointments   Date Time Provider Department Center   6/27/2024  6:30 AM  [facility]  LAB [facility]   6/27/2024  7:30 AM  XR RM2 (MISC FLUOROSCOPY) [facility] XRAY [facility]   6/27/2024  8:30 AM  CT MAIN RM3 [facility] CT [facility]   10/3/2024  2:00 PM Mackenzie Maher MD [facility]   11/15/2024 10:30 AM Kathi Sow DO [id number] [facility]         Next Care Manager Follow Up Date: 1 month    Reason For Follow Up: review progress and or barriers towards patient's goals.     Time Spent This Encounter Total: 28 min medical record review, telephone communication, care plan updates where needed, education, goals, and action plan recreation/update. Provided acknowledgment and validation to patient's concerns.   Monthly Minute Total including today: 43 min  Physical assessment, complete health history, and need for CCM established by Micheal Clifford MD.     No

## 2024-06-18 NOTE — TELEPHONE ENCOUNTER
Pt called stating she  is trying to check the status of a form she gave Ashley to complete from [facility] to see if she would qualify for a scooter. Pt would like a call back at [phone]

## 2024-06-19 ENCOUNTER — MED REC SCAN ONLY (OUTPATIENT)
Dept: INTERNAL MEDICINE CLINIC | Facility: CLINIC | Age: 79
End: 2024-06-19

## 2024-06-21 ENCOUNTER — TELEPHONE (OUTPATIENT)
Dept: MAMMOGRAPHY | Facility: HOSPITAL | Age: 79
End: 2024-06-21

## 2024-06-21 NOTE — TELEPHONE ENCOUNTER
Attempted to call patient re: medication clarification prior to scheduling breast biopsy. Patient was instructed to call us back re: medication allergies and sensitivities.  Message left for patient to call back.

## 2024-06-24 ENCOUNTER — LAB ENCOUNTER (OUTPATIENT)
Dept: LAB | Age: 79
End: 2024-06-24
Attending: STUDENT IN AN ORGANIZED HEALTH CARE EDUCATION/TRAINING PROGRAM

## 2024-06-24 ENCOUNTER — TELEPHONE (OUTPATIENT)
Facility: CLINIC | Age: 79
End: 2024-06-24

## 2024-06-24 DIAGNOSIS — E06.3 HASHIMOTO'S DISEASE: ICD-10-CM

## 2024-06-24 LAB
T4 FREE SERPL-MCNC: 0.8 NG/DL (ref 0.8–1.7)
TSI SER-ACNC: 5.03 MIU/ML (ref 0.36–3.74)

## 2024-06-24 PROCEDURE — 84439 ASSAY OF FREE THYROXINE: CPT

## 2024-06-24 PROCEDURE — 84443 ASSAY THYROID STIM HORMONE: CPT

## 2024-06-24 PROCEDURE — 36415 COLL VENOUS BLD VENIPUNCTURE: CPT

## 2024-06-26 ENCOUNTER — TELEPHONE (OUTPATIENT)
Dept: SURGERY | Facility: CLINIC | Age: 79
End: 2024-06-26

## 2024-06-26 RX ORDER — PREDNISONE, DIPHENHYDRAMINE
1 KIT SEE ADMIN INSTRUCTIONS
Qty: 1 KIT | Refills: 0 | Status: SHIPPED | OUTPATIENT
Start: 2024-06-26 | End: 2024-06-27 | Stop reason: ALTCHOICE

## 2024-06-26 NOTE — TELEPHONE ENCOUNTER
Call placed to Radiology to inquire.    Pt specific test does need allergy protocol for myelogram CT IV tmrw.    Pt informed via detailed message (NA) on cell #, ok per verbal on file.    Call placed to son and daughter per verbal release on file, no answer.    Also sent [employer] to inform.    Closing encounter.

## 2024-06-26 NOTE — TELEPHONE ENCOUNTER
Call rcvd from PSR stating pt has Cts scheduled for tomorrow, however the benadryl and prednisone orders have not yet been placed, and pt needs to begin the protocol starting later today.    Per Leana ORTIZ Radiology:    \"Call placed to patient regarding 13-hour contrast allergy prep. Pt instructed to take the following medications:     Prednisone 50 mg PO on 6/26/24 @ 19:30  Prednisone 50 mg PO on 6/27/24 @ 01:30  Prednisone 50 mg PO on 6/27/24 @ 07:30  Benadryl 50 mg PO on 6/27/24 @ 07:30  Patient must have a  and someone to stay with her overnight due to anesthesia.\"    PATIENT UPDATED and awaiting call from RN to inform once order placed. Pt appreciative of order.    Pended prednisone and benadryl orders and routed to PA for review.

## 2024-06-27 RX ORDER — DIPHENHYDRAMINE HCL 50 MG
50 CAPSULE ORAL SEE ADMIN INSTRUCTIONS
Qty: 1 CAPSULE | Refills: 0 | Status: SHIPPED | OUTPATIENT
Start: 2024-06-27

## 2024-06-27 RX ORDER — PREDNISONE 50 MG/1
50 TABLET ORAL SEE ADMIN INSTRUCTIONS
Qty: 3 TABLET | Refills: 0 | Status: SHIPPED | OUTPATIENT
Start: 2024-06-27

## 2024-06-27 NOTE — TELEPHONE ENCOUNTER
Msgs below noted.    Spoke with pt and PA today via phone call to inform.    New rx sent to match pharmacy stock at this time.    Pt acknowledged this was done asap as a courtesy from our outpatient clinic, and that we apologize she will have to again reschedule her imaging.    Pt appreciative of update.    Closing encounter.

## 2024-06-27 NOTE — TELEPHONE ENCOUNTER
RN currently working on this encounter. While pt is waiting for a call, "[employer] Message sent to inform that staff has rcvd her msg and is working out the situation immediately.    Pt informed she will receive a call from nursing with any updates.

## 2024-06-28 ENCOUNTER — TELEPHONE (OUTPATIENT)
Facility: CLINIC | Age: 79
End: 2024-06-28

## 2024-06-28 ENCOUNTER — TELEPHONE (OUTPATIENT)
Dept: MAMMOGRAPHY | Facility: HOSPITAL | Age: 79
End: 2024-06-28

## 2024-06-28 DIAGNOSIS — E06.3 HASHIMOTO'S DISEASE: Primary | ICD-10-CM

## 2024-06-28 NOTE — TELEPHONE ENCOUNTER
Follow up call to patient re: list of medications that she cannot have for her recommended breast biopsy. Patient would like to drop the list off to us. Patient instructed to bring the list to the [facility] and ask for the nurse.

## 2024-06-28 NOTE — TELEPHONE ENCOUNTER
LT4 further -->  1 tablet of 25 mcg on Monday-Thursday   2 tablets of 25 mcg (total 50 mcg) on Friday-Sunday       She should start taking 2 tablets on Friday, Saturday, and Sunday (instead of just Sat and Sun). Will sign above labs. Thanks!

## 2024-06-28 NOTE — TELEPHONE ENCOUNTER
RN phoned pt; pt made aware of Dr. Sow's response/order:  \"I have reviewed her labs. Her TSH has improved but is still above goal. She is currently takin tablet of 25 mcg on Monday-Friday   2 tablets of 25 mcg (total 50 mcg) on Saturday and       I would recommend she increases her LT4 further -->  1 tablet of 25 mcg on Monday-Thursday   2 tablets of 25 mcg (total 50 mcg) on Friday-      Repeat labs prior to our follow up.\"    Pt stated she has been on current Synthroid regimen of:  1 tablet of 25 mcg on Monday-Friday  2 tablets of 25 mcg (total 50 mcg) on Saturday and     for the last 3 days (per pt. had problems getting med from pharmacy).    RN to pend repeat labs (TSH, Free T4) and route to Dr. Sow for approval.    Message also routed to Dr. Sow.

## 2024-06-30 RX ORDER — LEVOTHYROXINE SODIUM 25 MCG
TABLET ORAL
COMMUNITY
Start: 2024-06-30

## 2024-07-01 ENCOUNTER — TELEPHONE (OUTPATIENT)
Dept: MAMMOGRAPHY | Facility: HOSPITAL | Age: 79
End: 2024-07-01

## 2024-07-01 ENCOUNTER — TELEPHONE (OUTPATIENT)
Dept: INTERNAL MEDICINE CLINIC | Facility: CLINIC | Age: 79
End: 2024-07-01

## 2024-07-01 RX ORDER — PREDNISONE 10 MG/1
TABLET ORAL
Qty: 15 TABLET | Refills: 0 | Status: SHIPPED | OUTPATIENT
Start: 2024-07-01 | End: 2024-07-02

## 2024-07-01 NOTE — TELEPHONE ENCOUNTER
Patient calling RN back. RN reviewed with pt that she is to be on Synthroid 1 tablet of 25 mcg on Monday-Friday  2 tablets of 25 mcg (total 50 mcg) on Saturday and Sunday, per Dr. Sow's orders below.    Pt also made aware that she is to have thyroid labs repeated in 6-8 weeks, holding any Biotin containing products for 3 days prior to lab draw. Pt verbalized understanding.     Labs (TSH and Free T4) already ordered.          Closing encounter.

## 2024-07-01 NOTE — TELEPHONE ENCOUNTER
Agree with concern and need for biopsy. I recommend to take prednisone 50 mg at 13 hours, 7 hours, and 1 hour before procedure, and oral benadryl 50 mg (2 tabs) one hour prior to procedure.

## 2024-07-01 NOTE — TELEPHONE ENCOUNTER
LOV 6/6/24    Received call from Amanda at [facility] mammography dept. They are working with pt to schedule breast biopsy however there have been barriers. Pt has a list of allergies in EPIC as well as paperwork. Pt's statements have been contradicting from time to time. Pt has allergy to Tetracaine which would be used. Amanda stated she spoke with pharmacist about allergies and what they can do. Allergy reaction is rash so pharmacist stated PCP can possibly premedicate pt for this. Amanda is wanting AD to weigh in. Can't schedule breast biopsy until this is figured out.     AD - Pt has allergy to tetracaine and list of others. Mammo dept is having trouble figuring out what to do with pt to perform breast biopsy.. Would you recommend pre-medicating pt prior to procedure? What do you recommend?

## 2024-07-02 ENCOUNTER — TELEPHONE (OUTPATIENT)
Dept: MAMMOGRAPHY | Facility: HOSPITAL | Age: 79
End: 2024-07-02

## 2024-07-02 NOTE — TELEPHONE ENCOUNTER
Called Amanda at [facility] dept to relay Dr Clifford's recommendations.  Called patient to discuss, she confirms understanding and states she has the medications at home already.  She requests the instructions be sent to her via [employer].  Message sent.

## 2024-07-09 ENCOUNTER — OFFICE VISIT (OUTPATIENT)
Dept: INTERNAL MEDICINE CLINIC | Facility: CLINIC | Age: 79
End: 2024-07-09
Payer: MEDICARE

## 2024-07-09 ENCOUNTER — LAB ENCOUNTER (OUTPATIENT)
Dept: LAB | Age: 79
End: 2024-07-09
Attending: INTERNAL MEDICINE
Payer: MEDICARE

## 2024-07-09 VITALS
DIASTOLIC BLOOD PRESSURE: 60 MMHG | OXYGEN SATURATION: 98 % | BODY MASS INDEX: 23 KG/M2 | TEMPERATURE: 97 F | WEIGHT: 107 LBS | HEART RATE: 52 BPM | SYSTOLIC BLOOD PRESSURE: 118 MMHG

## 2024-07-09 DIAGNOSIS — G96.198 EPIDURAL MASS: ICD-10-CM

## 2024-07-09 DIAGNOSIS — R51.9 TEMPLE TENDERNESS: ICD-10-CM

## 2024-07-09 DIAGNOSIS — H53.9 CHANGE IN VISION: ICD-10-CM

## 2024-07-09 DIAGNOSIS — I83.90 VARICOSE VEIN: ICD-10-CM

## 2024-07-09 DIAGNOSIS — E06.3 HASHIMOTO'S DISEASE: ICD-10-CM

## 2024-07-09 DIAGNOSIS — R51.9 TEMPLE TENDERNESS: Primary | ICD-10-CM

## 2024-07-09 DIAGNOSIS — Z01.818 PRE-OP EVALUATION: ICD-10-CM

## 2024-07-09 DIAGNOSIS — J45.40 MODERATE PERSISTENT ASTHMA WITHOUT COMPLICATION (HCC): ICD-10-CM

## 2024-07-09 LAB
CRP SERPL-MCNC: 0.46 MG/DL (ref ?–0.3)
ERYTHROCYTE [SEDIMENTATION RATE] IN BLOOD: 13 MM/HR
T4 FREE SERPL-MCNC: 0.8 NG/DL (ref 0.8–1.7)
TSI SER-ACNC: 5.16 MIU/ML (ref 0.36–3.74)

## 2024-07-09 PROCEDURE — 84443 ASSAY THYROID STIM HORMONE: CPT

## 2024-07-09 PROCEDURE — 84439 ASSAY OF FREE THYROXINE: CPT

## 2024-07-09 PROCEDURE — 3078F DIAST BP <80 MM HG: CPT | Performed by: INTERNAL MEDICINE

## 2024-07-09 PROCEDURE — 99214 OFFICE O/P EST MOD 30 MIN: CPT | Performed by: INTERNAL MEDICINE

## 2024-07-09 PROCEDURE — 86140 C-REACTIVE PROTEIN: CPT

## 2024-07-09 PROCEDURE — 1160F RVW MEDS BY RX/DR IN RCRD: CPT | Performed by: INTERNAL MEDICINE

## 2024-07-09 PROCEDURE — 85652 RBC SED RATE AUTOMATED: CPT

## 2024-07-09 PROCEDURE — 36415 COLL VENOUS BLD VENIPUNCTURE: CPT

## 2024-07-09 PROCEDURE — 3074F SYST BP LT 130 MM HG: CPT | Performed by: INTERNAL MEDICINE

## 2024-07-09 PROCEDURE — 1159F MED LIST DOCD IN RCRD: CPT | Performed by: INTERNAL MEDICINE
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
## 2024-07-10 RX ORDER — SODIUM CHLORIDE 9 MG/ML
INJECTION, SOLUTION INTRAVENOUS CONTINUOUS
Status: CANCELLED | OUTPATIENT
Start: 2024-07-10

## 2024-07-10 RX ORDER — CHLOROPROCAINE HYDROCHLORIDE 20 MG/ML
10 INJECTION, SOLUTION EPIDURAL; INFILTRATION; INTRACAUDAL; PERINEURAL ONCE
Status: DISCONTINUED | OUTPATIENT
Start: 2024-07-11 | End: 2024-07-12

## 2024-07-11 ENCOUNTER — ANESTHESIA (OUTPATIENT)
Dept: GENERAL RADIOLOGY | Facility: HOSPITAL | Age: 79
End: 2024-07-11
Payer: MEDICARE

## 2024-07-11 ENCOUNTER — ANESTHESIA EVENT (OUTPATIENT)
Dept: GENERAL RADIOLOGY | Facility: HOSPITAL | Age: 79
End: 2024-07-11
Payer: MEDICARE

## 2024-07-11 ENCOUNTER — TELEPHONE (OUTPATIENT)
Dept: MAMMOGRAPHY | Facility: HOSPITAL | Age: 79
End: 2024-07-11

## 2024-07-11 ENCOUNTER — NURSE ONLY (OUTPATIENT)
Dept: LAB | Facility: HOSPITAL | Age: 79
End: 2024-07-11
Attending: NURSE PRACTITIONER
Payer: MEDICARE

## 2024-07-11 ENCOUNTER — HOSPITAL ENCOUNTER (OUTPATIENT)
Dept: GENERAL RADIOLOGY | Facility: HOSPITAL | Age: 79
Discharge: HOME | End: 2024-07-11
Attending: NURSE PRACTITIONER
Payer: MEDICARE

## 2024-07-11 ENCOUNTER — HOSPITAL ENCOUNTER (OUTPATIENT)
Dept: CT IMAGING | Facility: HOSPITAL | Age: 79
Discharge: HOME | End: 2024-07-11
Attending: NURSE PRACTITIONER
Payer: MEDICARE

## 2024-07-11 VITALS
HEART RATE: 70 BPM | OXYGEN SATURATION: 97 % | DIASTOLIC BLOOD PRESSURE: 50 MMHG | SYSTOLIC BLOOD PRESSURE: 100 MMHG | TEMPERATURE: 97 F | RESPIRATION RATE: 16 BRPM

## 2024-07-11 VITALS
DIASTOLIC BLOOD PRESSURE: 62 MMHG | HEIGHT: 57 IN | BODY MASS INDEX: 22.22 KG/M2 | TEMPERATURE: 98 F | OXYGEN SATURATION: 98 % | RESPIRATION RATE: 26 BRPM | WEIGHT: 103 LBS | SYSTOLIC BLOOD PRESSURE: 124 MMHG | HEART RATE: 75 BPM

## 2024-07-11 DIAGNOSIS — G96.198 EPIDURAL MASS: Primary | ICD-10-CM

## 2024-07-11 DIAGNOSIS — G96.198 EPIDURAL MASS: ICD-10-CM

## 2024-07-11 LAB
INR BLD: 0.93 (ref 0.8–1.2)
PLATELET # BLD AUTO: 237 10(3)UL (ref 150–450)
PROTHROMBIN TIME: 12.5 SECONDS (ref 11.6–14.8)

## 2024-07-11 PROCEDURE — 72132 CT LUMBAR SPINE W/DYE: CPT | Performed by: NURSE PRACTITIONER

## 2024-07-11 PROCEDURE — 72129 CT CHEST SPINE W/DYE: CPT | Performed by: NURSE PRACTITIONER

## 2024-07-11 PROCEDURE — 62305 MYELOGRAPHY LUMBAR INJECTION: CPT | Performed by: NURSE PRACTITIONER

## 2024-07-11 PROCEDURE — 36415 COLL VENOUS BLD VENIPUNCTURE: CPT

## 2024-07-11 PROCEDURE — 85049 AUTOMATED PLATELET COUNT: CPT | Performed by: RADIOLOGY

## 2024-07-11 PROCEDURE — 72126 CT NECK SPINE W/DYE: CPT | Performed by: NURSE PRACTITIONER

## 2024-07-11 PROCEDURE — 85610 PROTHROMBIN TIME: CPT

## 2024-07-11 RX ORDER — LABETALOL HYDROCHLORIDE 5 MG/ML
5 INJECTION, SOLUTION INTRAVENOUS EVERY 5 MIN PRN
Status: ACTIVE | OUTPATIENT
Start: 2024-07-11 | End: 2024-07-11

## 2024-07-11 RX ORDER — SODIUM CHLORIDE 9 MG/ML
INJECTION, SOLUTION INTRAVENOUS CONTINUOUS
Status: DISCONTINUED | OUTPATIENT
Start: 2024-07-11 | End: 2024-07-13

## 2024-07-11 RX ORDER — SODIUM CHLORIDE, SODIUM LACTATE, POTASSIUM CHLORIDE, CALCIUM CHLORIDE 600; 310; 30; 20 MG/100ML; MG/100ML; MG/100ML; MG/100ML
INJECTION, SOLUTION INTRAVENOUS CONTINUOUS
Status: DISCONTINUED | OUTPATIENT
Start: 2024-07-11 | End: 2024-07-13

## 2024-07-11 RX ORDER — IOPAMIDOL 612 MG/ML
15 INJECTION, SOLUTION INTRATHECAL
Status: DISCONTINUED | OUTPATIENT
Start: 2024-07-11 | End: 2024-07-11

## 2024-07-11 RX ORDER — NALOXONE HYDROCHLORIDE 0.4 MG/ML
80 INJECTION, SOLUTION INTRAMUSCULAR; INTRAVENOUS; SUBCUTANEOUS AS NEEDED
Status: CANCELLED | OUTPATIENT
Start: 2024-07-11 | End: 2024-07-11

## 2024-07-11 RX ORDER — MIDAZOLAM HYDROCHLORIDE 1 MG/ML
1 INJECTION INTRAMUSCULAR; INTRAVENOUS EVERY 5 MIN PRN
Status: CANCELLED | OUTPATIENT
Start: 2024-07-11 | End: 2024-07-11

## 2024-07-11 RX ORDER — LABETALOL HYDROCHLORIDE 5 MG/ML
5 INJECTION, SOLUTION INTRAVENOUS EVERY 5 MIN PRN
Status: CANCELLED | OUTPATIENT
Start: 2024-07-11 | End: 2024-07-11

## 2024-07-11 RX ORDER — MIDAZOLAM HYDROCHLORIDE 1 MG/ML
INJECTION INTRAMUSCULAR; INTRAVENOUS AS NEEDED
Status: DISCONTINUED | OUTPATIENT
Start: 2024-07-11 | End: 2024-07-11 | Stop reason: SURG

## 2024-07-11 RX ORDER — SODIUM CHLORIDE, SODIUM LACTATE, POTASSIUM CHLORIDE, CALCIUM CHLORIDE 600; 310; 30; 20 MG/100ML; MG/100ML; MG/100ML; MG/100ML
INJECTION, SOLUTION INTRAVENOUS CONTINUOUS
Status: CANCELLED | OUTPATIENT
Start: 2024-07-11

## 2024-07-11 RX ORDER — NALOXONE HYDROCHLORIDE 0.4 MG/ML
80 INJECTION, SOLUTION INTRAMUSCULAR; INTRAVENOUS; SUBCUTANEOUS AS NEEDED
Status: ACTIVE | OUTPATIENT
Start: 2024-07-11 | End: 2024-07-11

## 2024-07-11 RX ORDER — MIDAZOLAM HYDROCHLORIDE 1 MG/ML
1 INJECTION INTRAMUSCULAR; INTRAVENOUS EVERY 5 MIN PRN
Status: ACTIVE | OUTPATIENT
Start: 2024-07-11 | End: 2024-07-11

## 2024-07-11 RX ORDER — CHLOROPROCAINE HYDROCHLORIDE 20 MG/ML
INJECTION, SOLUTION EPIDURAL; INFILTRATION; INTRACAUDAL; PERINEURAL
Status: DISCONTINUED
Start: 2024-07-11 | End: 2024-07-11 | Stop reason: WASHOUT

## 2024-07-11 RX ORDER — IOPAMIDOL 612 MG/ML
15 INJECTION, SOLUTION INTRATHECAL
Status: COMPLETED | OUTPATIENT
Start: 2024-07-11 | End: 2024-07-11

## 2024-07-11 RX ADMIN — MIDAZOLAM HYDROCHLORIDE 3 MG: 1 INJECTION INTRAMUSCULAR; INTRAVENOUS at 08:11:00

## 2024-07-11 RX ADMIN — MIDAZOLAM HYDROCHLORIDE 1 MG: 1 INJECTION INTRAMUSCULAR; INTRAVENOUS at 07:52:00

## 2024-07-11 NOTE — ANESTHESIA POSTPROCEDURE EVALUATION
[facility]    Arianna Colin Patient Status:  Outpatient   Age/Gender 79 year old female MRN [id number]   Location [facility] X-RAY Attending Ashley Anderson APN   Hosp Day # 0 PCP Micheal Clifford MD       Anesthesia Post-op Note        Procedure Summary       Date: 07/11/24 Room / Location: [facility] Perioperative Service; [facility] CT; [facility] X-ray    Anesthesia Start: 0748 Anesthesia Stop: 0922    Procedures:       XR MYELOGRAM CERVICAL/THORACIC/LUMBAR WITH INJECTION (CPT=62305)      CT SPINE CERV THOR LUMB MYELOGRAM (IV) (CPT=72126/23931/09269) Diagnosis: Epidural mass    Scheduled Providers: Scooter Ocasio MD Anesthesiologist: Scooter Ocasio MD    Anesthesia Type: MAC ASA Status: 2            Anesthesia Type: MAC    Vitals Value Taken Time   /62 07/11/24 0939   Temp 97 07/11/24 0939   Pulse 71 07/11/24 0939   Resp 20 07/11/24 0939   SpO2 96 07/11/24 0939       Patient Location: Endoscopy    Anesthesia Type: MAC    Airway Patency: patent    Postop Pain Control: adequate    Mental Status: mildly sedated but able to meaningfully participate in the post-anesthesia evaluation    Nausea/Vomiting: none    Cardiopulmonary/Hydration status: stable euvolemic    Complications: no apparent anesthesia related complications    Postop vital signs: stable    Dental Exam: Unchanged from Preop    Patient to be discharged home when criteria met.

## 2024-07-11 NOTE — PROGRESS NOTES
Please have patient increase her LT4 further based on TSH that is still above range-->  1 tablet of 25 mcg on Monday-Thursday   2 tablets of 25 mcg (total 50 mcg) on Friday-Sunday    She should currently be on 1 tablet of 25 mcg Monday through Friday and 2 tablets of 25 mcg on Saturday and Sunday.  Please let me know if she has any questions

## 2024-07-11 NOTE — TELEPHONE ENCOUNTER
Attempted to call patient re: confirmation of allergy medication protocol prescribed by patient's PCP. Message left for patient to call back.

## 2024-07-11 NOTE — PROCEDURES
[facility]   part of [facility]  Procedure Note    Arainna Colin Patient Status:  Outpatient    1945 MRN [id number]   Location [facility] X-RAY Attending Ashley Anderson APN   Hosp Day # 0 PCP Micheal Clifford MD     Procedure: Fluoro guided myelogram C/T/L spine    Pre-Procedure Diagnosis:  Mass    Post-Procedure Diagnosis: Same    Anesthesia:  Local and MAC    Findings:  L5-S1 puncture 20g spinal, 10 ml Isovue 300M injected    Specimens: As above    Blood Loss:  Minimal    Tourniquet Time: None  Complications:  None  Drains:  None    Secondary Diagnosis:  None    Lane Silverio MD  2024

## 2024-07-11 NOTE — DISCHARGE INSTRUCTIONS
Home Care Instructions  [facility] Department of Radiology  MYELOGRAM, MYELOGRAPHY, LUMBAR PUNCTURE  Dr Silverio    Have someone drive you home after your procedure.  You may not drive yourself home  You must have a responsible adult stay with you overnight AT HOME  Remain flat in bed until the next morning  You may get up to walk to the bathroom.  DO drink plenty of fluids  DO resume your regular diet  DO call the radiologist immediately if you experience a severe headache, DIAL [phone] and ask the  to page the on call Radiologist.  DO NOT take Phenothiazines for 48 hours. Some examples are: Thorazine, Sparine, Vesprin, Mellaril, Severntil, Tindal, Trilaton, Compazine, Prolixin, Stelazaine.) Check with M.D. if uncertain.  If you take Glucophage, DO NOT take until 48 hours after the myelogram. Additional Instructions

## 2024-07-11 NOTE — ANESTHESIA PREPROCEDURE EVALUATION
PRE-OP EVALUATION    Patient Name: Arianna Colin    Admit Diagnosis: Epidural mass [G96.198]    Pre-op Diagnosis: * No surgery found *        Anesthesia Procedure: XR MYELOGRAM CERVICAL/THORACIC/LUMBAR WITH INJECTION (CPT=62305)    * Surgery not found *    Pre-op vitals reviewed.        Body mass index is 22.29 kg/m².    Current medications reviewed.  Hospital Medications:   chloroprocaine (PF) (Nesacaine) 2% injection 200 mg  10 mL Other Once       Outpatient Medications:   (Not in a hospital admission)      Allergies: Carbamazepine, Clonazepam, Other, Soy allergy, Isoflavones, Codeine, Diatrizoate, Gluten meal, Iodine (topical), Meperidine, Ciprofloxacin hcl, Erythromycin, Soybean allergy, Sulfa antibiotics, Tetracaine, Tetracycline, Vicodin [hydrocodone-acetaminophen], and Xylocaine [lidocaine]      Anesthesia Evaluation    Patient summary reviewed.    Anesthetic Complications  (+) history of anesthetic complications         GI/Hepatic/Renal      (+) GERD                           Cardiovascular      ECG reviewed.                                                 Endo/Other                                  Pulmonary      (+) asthma  (+) COPD       (+) shortness of breath            Neuro/Psych                 (+) neuromuscular disease             Patient Active Problem List:     Fibromyalgia     History of Lyme disease     Raynaud's disease     Reactive depression     Age-related osteoporosis without current pathological fracture     MCI (mild cognitive impairment)     Hyperopia of both eyes with astigmatism     Age-related nuclear cataract, bilateral     Depressed mood     Osteoarthritis of both hands     Anxiety     Hashimoto's disease     Moderate persistent asthma without complication (HCC)     Acquired hypothyroidism     Polyarthralgia     Epidural mass     Thoracic degenerative disc disease     Degenerative disc disease at L5-S1 level     Primary osteoarthritis of both hips     DDD (degenerative disc  disease), cervical     Asthma with COPD (chronic obstructive pulmonary disease) (HCC)     Dyspnea, unspecified type            Past Surgical History:   Procedure Laterality Date      ,    Cholecystectomy      Colonoscopy      Egd      Hysterectomy      Total abdom hysterectomy       Social History     Socioeconomic History    Marital status:    Tobacco Use    Smoking status: Former     Current packs/day: 0.00     Average packs/day: 0.5 packs/day for 1 year (0.5 ttl pk-yrs)     Types: Cigarettes     Start date:      Quit date:      Years since quittin.5     Passive exposure: Never    Smokeless tobacco: Never    Tobacco comments:     Smoke in college for a year to 2   Vaping Use    Vaping status: Never Used   Substance and Sexual Activity    Alcohol use: Not Currently    Drug use: No   Other Topics Concern    Caffeine Concern Yes     Comment: 1 cup daily     History   Drug Use No     Available pre-op labs reviewed.  Lab Results   Component Value Date    WBC 6.6 2024    RBC 4.23 2024    HGB 13.1 2024    HCT 38.4 2024    MCV 90.8 2024    MCH 31.0 2024    MCHC 34.1 2024    RDW 13.2 2024    .0 2024     Lab Results   Component Value Date     2024    K 3.7 2024     2024    CO2 26.0 2024    BUN 17 2024    CREATSERUM 0.60 2024    GLU 87 2024    CA 9.5 2024            Airway      Mallampati: II  Mouth opening: >3 FB  TM distance: > 6 cm  Neck ROM: full Cardiovascular    Cardiovascular exam normal.         Dental    Dentition appears grossly intact         Pulmonary    Pulmonary exam normal.                 Other findings              ASA: 2   Plan: MAC  NPO status verified and patient meets guidelines.        Comment: Plan is MAC anesthesia, which likely will include deep sedation.  Implied that memory of procedure is unlikely although intraop recall, if it occurs,  may be a reasonable and comfortable experience with this anesthetic.  Aware that general anesthesia is not intended though deep sedation may include brief moments of general anesthesia.   Questions answered. Accepts. The consent was signed without further questions.  \  Plan/risks discussed with: patient                Present on Admission:  **None**

## 2024-07-12 ENCOUNTER — TELEPHONE (OUTPATIENT)
Dept: SURGERY | Facility: CLINIC | Age: 79
End: 2024-07-12

## 2024-07-12 ENCOUNTER — TELEPHONE (OUTPATIENT)
Facility: CLINIC | Age: 79
End: 2024-07-12

## 2024-07-12 ENCOUNTER — TELEPHONE (OUTPATIENT)
Dept: MAMMOGRAPHY | Facility: HOSPITAL | Age: 79
End: 2024-07-12

## 2024-07-12 DIAGNOSIS — E06.3 HASHIMOTO'S DISEASE: Primary | ICD-10-CM

## 2024-07-12 DIAGNOSIS — E03.9 HYPOTHYROIDISM, UNSPECIFIED TYPE: ICD-10-CM

## 2024-07-12 DIAGNOSIS — G96.198 EPIDURAL MASS: Primary | ICD-10-CM

## 2024-07-12 RX ORDER — CHLOROPROCAINE HYDROCHLORIDE 20 MG/ML
10 INJECTION, SOLUTION EPIDURAL; INFILTRATION; INTRACAUDAL; PERINEURAL ONCE
Status: DISCONTINUED | OUTPATIENT
Start: 2024-07-11 | End: 2024-07-13

## 2024-07-12 RX ORDER — SODIUM CHLORIDE 9 MG/ML
INJECTION, SOLUTION INTRAVENOUS CONTINUOUS
Status: DISCONTINUED | OUTPATIENT
Start: 2024-07-12 | End: 2024-07-13

## 2024-07-12 RX ORDER — LEVOTHYROXINE SODIUM 25 MCG
TABLET ORAL
COMMUNITY
Start: 2024-07-12

## 2024-07-12 NOTE — TELEPHONE ENCOUNTER
Performed outreach call.    Pt. had CT Myelogram completed yesterday 07/11/24.     Is having pain, so she would like home health order.     What kind of pain is she having? Back pain that occurs all over, but mainly on the top of her back and sides. Pt. stated she also feels some chest discomfort, but said she gets breast pain and will be getting mammogram done for that.  Pt. has been using ice and taking Naproxen, but that has not been helping with her back pain.     Any headaches? She has been having a little bit of headaches. Took Naproxen and that helped for a little bit, but her HA was coming back so she will be taking another one and will try to eat a meal.     Are they positional? Her HA are not positional they just happen.      Is it back soreness, which can be expected? Pt. stated the pain is constant and annoying. It is hard for her to get out of bed.     Any new neurologic complaints of the BUE or BLE? Can't move arms when walking because back hurts. She holds her arms down when she walks because of her back pain.      Is the home health order for the pain s/p myelogram? Or for her back pain that has prompted the ct myelogram?   Pt. stated the pain got worse after myelogram but was there before.     Is the home health order request for PT? Or nursing care? Pt. said she just wants someone to help her with grocery shopping and helping her with things around the house. I asked pt. if she has been having issues bathing herself and she stated she has not tried yet, but does not think she would be able to do it.     Other symptoms she has been having is: Hard time sleeping, loss of appetite, having hard time keeping water down, it feels like it is going down slowly. She see colors when she closes her eyes and will see figures. She will see things move in her room. She also has a cough and overall just does not feel good.     Routed to [employer].

## 2024-07-12 NOTE — TELEPHONE ENCOUNTER
Spoke with patient on telephone and confirmed current dose of Synthroid- she is currently taking 1 tablet of 25 mcg Monday through Friday and 2 tablets of 25 mcg on Saturday and Sunday.    Reviewed to increase dose slightly to: 1 tablet of 25 mcg on Monday-Thursday, 2 tablets of 25 mcg (total 50 mcg) on Friday-Sunday      She verbalized understanding and states will do. Denies any need for refill at the moment.    Reviewed she should repeat labs in 6-8 weeks.

## 2024-07-12 NOTE — TELEPHONE ENCOUNTER
Pt states having pain after myelogram yesterday  and needs home health care. Please call to discuss.

## 2024-07-12 NOTE — TELEPHONE ENCOUNTER
[facility], can we please do a patient outreach? What kind of pain is she having? Any headaches? Are they positional? Is it back soreness, which cab be expected? Any new neurologic complaints of the BUE or BLE?    Is the home health order for the pain s/p myelogram? Or for her back pain that has prompted the ct myelogram? Is the home health order request for PT? Or nursing care?     Please advise and get further information, appreciate nursing assistance

## 2024-07-12 NOTE — TELEPHONE ENCOUNTER
Patient returning our call re: allergy premedication for upcoming breast biopsy on 7-19-24. Patient confirms that she has medication regimen from her PCP written down and currently has the medications. Patient has no further questions at this time.

## 2024-07-12 NOTE — TELEPHONE ENCOUNTER
I can place order for [facility] but I would highly encourage she follow up with her PCP on the multiple other issues she has mentioned, such as cough, malaise, difficulty sleeping, and loss of appetite.

## 2024-07-12 NOTE — TELEPHONE ENCOUNTER
Message received regarding home health request from pt.     CT Myelogram completed 07/11/24.     LOV 04/30/24    \" Follow- UP    Epidural mass     HISTORY OF PRESENT ILLNESS:  Arianna Colin is a 79 year old female who presents today for follow up.  Overall pt states she is feeling better although she continues to have mid-thoracic pain and pain across her waistline with radiation into bilateral outer hips.  Pt has been unable to complete the myelogram at this time due to multiple drug allergies but she has found anesthetics that she is able to take.  Pt has the list with her today.    PLAN:  Reviewed imaging with patient.  This is stable from previous imaging  2.   Dr. Gaspar evaluated pt, recommend follow up after completion of the CT myelogram  3.  Discussed PT for her thoracic pain.  Pt would like to try acupuncture and massage first.\"    Routed to VAUGHN Meléndez.

## 2024-07-12 NOTE — TELEPHONE ENCOUNTER
Message from Provider received regarding [facility] Order.    Called pt. and relayed message.    Home health order was placed and she can find it in the referral tab on MyChart and to follow up with her PCP for other symptoms such as cough, malaise, difficulty sleeping, and loss of appetite.     Pt. verbalized understanding and was appreciative of the call.     Nothing needed further with this encounter.

## 2024-07-15 ENCOUNTER — APPOINTMENT (OUTPATIENT)
Dept: GENERAL RADIOLOGY | Facility: HOSPITAL | Age: 79
End: 2024-07-15
Payer: MEDICARE

## 2024-07-15 ENCOUNTER — PATIENT OUTREACH (OUTPATIENT)
Dept: CASE MANAGEMENT | Age: 79
End: 2024-07-15

## 2024-07-15 ENCOUNTER — TELEPHONE (OUTPATIENT)
Dept: INTERNAL MEDICINE CLINIC | Facility: CLINIC | Age: 79
End: 2024-07-15

## 2024-07-15 ENCOUNTER — HOSPITAL ENCOUNTER (EMERGENCY)
Facility: HOSPITAL | Age: 79
Discharge: HOME | End: 2024-07-15
Attending: EMERGENCY MEDICINE
Payer: MEDICARE

## 2024-07-15 ENCOUNTER — APPOINTMENT (OUTPATIENT)
Dept: CT IMAGING | Facility: HOSPITAL | Age: 79
End: 2024-07-15
Attending: EMERGENCY MEDICINE
Payer: MEDICARE

## 2024-07-15 VITALS
TEMPERATURE: 98 F | RESPIRATION RATE: 15 BRPM | OXYGEN SATURATION: 100 % | WEIGHT: 101 LBS | DIASTOLIC BLOOD PRESSURE: 62 MMHG | BODY MASS INDEX: 20.36 KG/M2 | HEART RATE: 60 BPM | HEIGHT: 59 IN | SYSTOLIC BLOOD PRESSURE: 110 MMHG

## 2024-07-15 DIAGNOSIS — E06.3 HASHIMOTO'S DISEASE: ICD-10-CM

## 2024-07-15 DIAGNOSIS — E03.9 ACQUIRED HYPOTHYROIDISM: Primary | ICD-10-CM

## 2024-07-15 DIAGNOSIS — J44.89 ASTHMA WITH COPD (CHRONIC OBSTRUCTIVE PULMONARY DISEASE) (HCC): ICD-10-CM

## 2024-07-15 DIAGNOSIS — G96.198 EPIDURAL MASS: ICD-10-CM

## 2024-07-15 DIAGNOSIS — F41.9 ANXIETY: ICD-10-CM

## 2024-07-15 DIAGNOSIS — M51.37 DEGENERATIVE DISC DISEASE AT L5-S1 LEVEL: ICD-10-CM

## 2024-07-15 DIAGNOSIS — R51.9 ACUTE NONINTRACTABLE HEADACHE, UNSPECIFIED HEADACHE TYPE: Primary | ICD-10-CM

## 2024-07-15 LAB
ALBUMIN SERPL-MCNC: 4 G/DL (ref 3.4–5)
ALBUMIN/GLOB SERPL: 1.1 {RATIO} (ref 1–2)
ALP LIVER SERPL-CCNC: 86 U/L
ALT SERPL-CCNC: 26 U/L
ANION GAP SERPL CALC-SCNC: 8 MMOL/L (ref 0–18)
AST SERPL-CCNC: 23 U/L (ref 15–37)
BASOPHILS # BLD AUTO: 0.05 X10(3) UL (ref 0–0.2)
BASOPHILS NFR BLD AUTO: 1 %
BILIRUB SERPL-MCNC: 0.6 MG/DL (ref 0.1–2)
BUN BLD-MCNC: 16 MG/DL (ref 9–23)
CALCIUM BLD-MCNC: 9.5 MG/DL (ref 8.5–10.1)
CHLORIDE SERPL-SCNC: 105 MMOL/L (ref 98–112)
CO2 SERPL-SCNC: 25 MMOL/L (ref 21–32)
CREAT BLD-MCNC: 0.78 MG/DL
EGFRCR SERPLBLD CKD-EPI 2021: 76 ML/MIN/1.73M2 (ref 60–?)
EOSINOPHIL # BLD AUTO: 0.09 X10(3) UL (ref 0–0.7)
EOSINOPHIL NFR BLD AUTO: 1.7 %
ERYTHROCYTE [DISTWIDTH] IN BLOOD BY AUTOMATED COUNT: 13.2 %
GLOBULIN PLAS-MCNC: 3.5 G/DL (ref 2.8–4.4)
GLUCOSE BLD-MCNC: 94 MG/DL (ref 70–99)
HCT VFR BLD AUTO: 40.7 %
HGB BLD-MCNC: 14.1 G/DL
IMM GRANULOCYTES # BLD AUTO: 0.01 X10(3) UL (ref 0–1)
IMM GRANULOCYTES NFR BLD: 0.2 %
LYMPHOCYTES # BLD AUTO: 1.93 X10(3) UL (ref 1–4)
LYMPHOCYTES NFR BLD AUTO: 37.1 %
MCH RBC QN AUTO: 31.3 PG (ref 26–34)
MCHC RBC AUTO-ENTMCNC: 34.6 G/DL (ref 31–37)
MCV RBC AUTO: 90.4 FL
MONOCYTES # BLD AUTO: 0.26 X10(3) UL (ref 0.1–1)
MONOCYTES NFR BLD AUTO: 5 %
NEUTROPHILS # BLD AUTO: 2.86 X10 (3) UL (ref 1.5–7.7)
NEUTROPHILS # BLD AUTO: 2.86 X10(3) UL (ref 1.5–7.7)
NEUTROPHILS NFR BLD AUTO: 55 %
OSMOLALITY SERPL CALC.SUM OF ELEC: 287 MOSM/KG (ref 275–295)
PLATELET # BLD AUTO: 235 10(3)UL (ref 150–450)
POTASSIUM SERPL-SCNC: 4.2 MMOL/L (ref 3.5–5.1)
PROT SERPL-MCNC: 7.5 G/DL (ref 6.4–8.2)
RBC # BLD AUTO: 4.5 X10(6)UL
SODIUM SERPL-SCNC: 138 MMOL/L (ref 136–145)
TROPONIN I SERPL HS-MCNC: <3 NG/L
WBC # BLD AUTO: 5.2 X10(3) UL (ref 4–11)

## 2024-07-15 PROCEDURE — 93005 ELECTROCARDIOGRAM TRACING: CPT

## 2024-07-15 PROCEDURE — 85025 COMPLETE CBC W/AUTO DIFF WBC: CPT | Performed by: EMERGENCY MEDICINE

## 2024-07-15 PROCEDURE — 99285 EMERGENCY DEPT VISIT HI MDM: CPT

## 2024-07-15 PROCEDURE — 70450 CT HEAD/BRAIN W/O DYE: CPT | Performed by: EMERGENCY MEDICINE

## 2024-07-15 PROCEDURE — 36415 COLL VENOUS BLD VENIPUNCTURE: CPT

## 2024-07-15 PROCEDURE — 84484 ASSAY OF TROPONIN QUANT: CPT | Performed by: EMERGENCY MEDICINE

## 2024-07-15 PROCEDURE — 71045 X-RAY EXAM CHEST 1 VIEW: CPT

## 2024-07-15 PROCEDURE — 93010 ELECTROCARDIOGRAM REPORT: CPT

## 2024-07-15 PROCEDURE — 80053 COMPREHEN METABOLIC PANEL: CPT | Performed by: EMERGENCY MEDICINE

## 2024-07-15 NOTE — PROGRESS NOTES
NCM received direct phone call from patient.    Patient stated her headache has come back and feels like it is getting worse again. Patient states she is also noticed she is starting to feel lightheaded and just does not feel well. NCM advised patient she needs to go to an Urgent Care or ER to be further evaluated and patient verbalized agreement.

## 2024-07-15 NOTE — PROGRESS NOTES
Patient spoke with [facility] today and stated she has a severe headache.   This RN called the patient to follow up. Patient stated today she has had a headache for about 15-20 minutes which felt painful rated a 8/10. Patient stated she felt a sensation that \"a string went to the back of my neck\" pt stated this was for a few seconds then went away. Patient states since I have called her she laid down and the headache feels a little bit better but still feels pressure on the L side of her temple. Patient states this feels like what her usual headache feels like. The patient denies vision changes, chest pain, shortness of breath, numbness or tingling on of side of her face or body. Patient stated she checked her blood pressure today 146/6?. She rates her headache now a 4/10.The patient stated she has chronic headaches but this headache feels like it has lasted longer than it usually does. She has not tried anything over the encounter to help alleviate. She drank coffee to help and feels like it did help. Patient stated she does not want to go to the ER but if it becomes worse she will go to the ER. NCM advised patient to take Tylenol and stay hydrated.    A message has been sent to the PCP office to follow up with the patient.

## 2024-07-15 NOTE — PROGRESS NOTES
7/15/2024  Spoke to Arianna for [facility] monthly.    Updates to patient care team/ comments: UTD  Patient reported changes in medications: Synthroid 25mcg increase dose slightly to: 1 tablet of 25 mcg on Monday-Thursday, 2 tablets of 25 mcg (total 50 mcg) on Friday-Sunday     Med Adherence  Comment: Has not been taking Synthroid    Health Maintenance:  Reviewed  Health Maintenance   Topic Date Due    Pneumococcal Vaccine: 65+ Years (1 of 2 - PCV) Never done    Zoster Vaccines (1 of 2) Never done    Colorectal Cancer Screening  01/01/2023    COVID-19 Vaccine (1 - 2023-24 season) Never done    Influenza Vaccine (1) 10/01/2024    DEXA Scan  Completed    MA Annual Health Assessment  Completed    Annual Depression Screening  Completed    Fall Risk Screening (Annual)  Completed       Patient current concerns:   Patient has multiple concerns.  Thyroid: The patient had labs drawn; TSH and Free T4. Based on lab results Synthroid 25 mcg dose was changed an the patient was instructed to increase dose slightly to: 1 tablet of 25 mcg on Monday-Thursday, 2 tablets of 25 mcg (total 50 mcg) on Friday-Sunday. Patient reports that she has not taken Synthroid for months and instead has been taking a herbal supplement for her thyroid. I reviewed the lab results with the patient and explained that her numbers indicate her herbal supplement is not affecting her thyroid. The patient will restart Synthroid and repeat labs as she was instructed to.   Component      Latest Ref Rng 6/24/2024 7/9/2024   T4,Free (Direct)      0.8 - 1.7 ng/dL 0.8  0.8    TSH      0.358 - 3.740 mIU/mL 5.030 (H)  5.160 (H)       Asthma: The patient continues to have RAMSEY. The patient attempted to go outside today and by the time she reached her mailbox; a neighbor had to help her back to her door. I explained to her that going out in hot humid weather is a trigger for asthma symptoms. Advised her to only go out in humid weather if needed and to make sure she carries her  inhalers with her. Patient is agreeable.     Vision: The patient was given a referral for Ophthalmology and has requested [facility]'s help in scheduling appointment. The patient complains of worsening vision and at times blurred.     Varicose Veins: The patient was given a referral for Vascular surgery. She has not scheduled appointment yet and is requesting assistance from [facility] to help schedule appointment.     Headaches: The patient reported having a headache today. She states she had a cup of coffee and went to lay down. This seems to have helped with headache. Pain has improved.     Mammogram: The patient had a mammogram and ultrasound done on 6/17/24 and now she needs a biopsy. The biopsy is scheduled for 7/19/24. Reminded the patient that she needs to premedicate prior to biopsy due to lidocaine allergy.   CONCLUSION:  Recommend stereotactic biopsy of 2 sites of clustered calcifications in the upper-outer quadrant of the left breast.  Biopsy recommendation was discussed with the patient at the time of her exam.     3 mm and 4 mm complicated cysts retroareolar 11 o'clock position right breast.  These are amenable to 6 month follow-up with ultrasound .    BI-RADS CATEGORY:    DIAGNOSTIC CATEGORY 4b-MODERATE SUSPICION FOR MALIGNANCY:       RECOMMENDATIONS:    STEREOTACTIC BREAST BIOPSY: LEFT BREAST    Dictated by (CST): Fariba Barr MD on 6/17/2024 at 11:03 AM       Finalized by (CST): Fariba Barr MD on 6/17/2024 at 11:23 AM       Myelogram and CT of spine, cervical, thoracic and lumbar done on 7/11/24. Patient complains of back pain and stiffness post procedure, some improvement in pain today.    CT Impression   CONCLUSION:  The fluid collection within the lumbar spine from T12 through L2 is again identified.  This measures slightly smaller than on the prior exam and contains contrast material, communicating with the thecal sac.  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     Otherwise  there are overall mild multilevel degenerative changes of the visualized cervical, thoracic and lumbar spine as described above.      Dictated by (CST): Lane Silverio MD on 7/11/2024 at 1:25 PM      Finalized by (CST): Lane Silverio MD on 7/11/2024 at 1:39 PM       Myelogram Impression   CONCLUSION:  Technically successful myelogram.  Please refer to separately dictated CT of the cervical, thoracic and lumbar spine for further details.     Dictated by (CST): Lane Silverio MD on 7/11/2024 at 12:37 PM      Finalized by (CST): Lane Silverio MD on 7/11/2024 at 12:37 PM       Home health: Patient states no one has reached out to her about home health. Asking CCM's assistance in getting an update on status.       Goals/Action Plan:    Active goal from previous outreach: The patient would like to manage her health, stay healthy.    Patient reported progress towards goals: The patient continues to work towards goals.  What: The patient needs help scheduling multiple appointment.  Where/When/How: [facility] has helped schedule appointments with Vascular and Ophthalmology.  Patient Reported Barriers and Concerns: The patient needs to have a breast biopsy done, it is scheduled for 7/19/24. The patient would like to reschedule appointment due continued pain from myelogram procedure.   Plan for overcoming barriers: CCM called  to reschedule biopsy. They will reach out to patient.         Care managers interventions:   CCM assisted patient schedule the following appointments.  Vascular surgery July 31st at 1pm with Vivian Burrell NP  Ophthalmology Aug. 12 at 9:15 with Dr. Lydia Rahman   Call placed to [facility] to check on status   Patient requested to re schedule her breast biopsy on 7/19/24 - called placed to coordinator, they will reach out to patient and reschedule.       Appointments reviewed with patient.   Future Appointments   Date Time Provider Department Center   7/31/2024  1:00 PM Adri  [place] [facility]   10/3/2024  2:00 PM Mackenzie Maher MD [facility] [place]   11/15/2024 10:30 AM Kathi Sow DO [id number] [facility] [place]            Next Care Manager Follow Up Date: 1 month    Reason For Follow Up: review progress and or barriers towards patient's goals.     Time Spent This Encounter Total: 18 min medical record review, telephone communication, care plan updates where needed, education, goals, and action plan recreation/update. Provided acknowledgment and validation to patient's concerns.   Monthly Minute Total including today: 23 min    Physical assessment, complete health history, and need for CCM established by Micheal Clifford MD.

## 2024-07-15 NOTE — ED INITIAL ASSESSMENT (HPI)
Headache and chest pain off and on since 2pm. Drank a lot of coffee to get rid of the headache. Headache started on left temporal and wrapped to the back of head. Denies dizziness or lightheadedness now

## 2024-07-15 NOTE — TELEPHONE ENCOUNTER
Called and spoke to pt. Pt still having HA, worse when she stands up. Pt also had some dizziness when standing up. BP earlier was 146/68, rechecked while on phone and /66 HR 81. Pt stated this BP is much higher than her baseline. When asked about CP/SOB, pt stated she does have some mild CP/SOB. Given sxs, advised to be seen in ER for eval. Pt unsure of going, stated she has gone in the past and they have told her nothing was wrong, pt more agreeable to UC. Informed her ER eval is preferred, but UC at minimum. Pt stated understanding and agreed to plan. UC/ER location information sent via  per pt request.

## 2024-07-15 NOTE — TELEPHONE ENCOUNTER
Patient spoke with [facility] today and stated she has a severe headache.   This RN called the patient to follow up. Patient stated today she has had a headache for about 15-20 minutes which felt painful rated a 8/10. Patient stated she felt a sensation that \"a string went to the back of my neck\" pt stated this was for a few seconds then went away. Patient states since I have called her she laid down and the headache feels a little bit better but still feels pressure on the L side of her temple. Patient states this feels like what her usual headache feels like. The patient denies vision changes, chest pain, shortness of breath, numbness or tingling on of side of her face or body. Patient stated she checked her blood pressure today 146/6?. She rates her headache now a 4/10.The patient stated she has chronic headaches but this headache feels like it has lasted longer than it usually does. She has not tried anything over the encounter to help alleviate. She drank coffee to help and feels like it did help. Patient stated she does not want to go to the ER but if it becomes worse she will go to the ER. NCM advised patient to take Tylenol and stay hydrated.      Clinical staff: Please discuss with Dr. Taylor regarding the following. Please call the patient with further recommendations.   Thank you!

## 2024-07-15 NOTE — PROGRESS NOTES
Patient called today complaining of headache - states pain is severe  She saw Dr Taylor on 7/9/24 - headache has been ongoing  Per patient she was told that if her headache got worse she was to go to the emergency room.   Patient does not think she needs to go to the emergency room, she would like to just take Tylenol.    I advised her that I would have one of our nurses call to triage her.  Patient is agreeable with plan.     Emmy Johnson RN will reach out to patient.   See other encounter     Total time -  5 min  Total Monthly time-  5 min

## 2024-07-16 ENCOUNTER — TELEPHONE (OUTPATIENT)
Dept: INTERNAL MEDICINE CLINIC | Facility: CLINIC | Age: 79
End: 2024-07-16

## 2024-07-16 LAB
ATRIAL RATE: 75 BPM
P AXIS: 64 DEGREES
P-R INTERVAL: 150 MS
Q-T INTERVAL: 388 MS
QRS DURATION: 76 MS
QTC CALCULATION (BEZET): 433 MS
R AXIS: 35 DEGREES
T AXIS: 64 DEGREES
VENTRICULAR RATE: 75 BPM

## 2024-07-16 NOTE — TELEPHONE ENCOUNTER
CCM assisted patient with scheduling an appointment with Vascular surgery and  indicated that referral needs to be for vascular only - do not assign a provider     Can you reorder referral for vascular only.    Thank you.
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
## 2024-07-16 NOTE — TELEPHONE ENCOUNTER
Patient is HMO and needs to stay within [facility] network.  I am only aware of two vascular surgeons with [facility] (I don't think patient can see them?)     Dr Taylor are you aware of any [facility] vascular surgeons?

## 2024-07-16 NOTE — TELEPHONE ENCOUNTER
Francis Taylor, DO put referral in for patient to see vascular surgeon in [place] and she lives in [place] and wants to see someone here please call with name and number

## 2024-07-16 NOTE — TELEPHONE ENCOUNTER
I am only aware of [facility] in [place]. I just confirmed with my colleagues. No one here on the [facility] side.

## 2024-07-16 NOTE — PROGRESS NOTES
[facility] assisted patient schedule the following appointments.    Vascular surgery July 31st at 1pm with Vivian Burrell NP  Message sent to Dr Clifford to fix referral    9 min total      Ophthalmology Aug. 12 at 9:15 with Dr. Lydia Rahman   Referral was faxed to [phone]    8 min total    Call placed to [facility] to check on status     4 min total    Patient requested to re schedule her breast biopsy on 7/19/24 - called placed to coordinator, they will reach out to patient and reschedule.     6 min total    [facility] message sent to patient with the above information.    4 min total      Total time -  31 min  Total Monthly time-  54 min

## 2024-07-17 ENCOUNTER — PATIENT OUTREACH (OUTPATIENT)
Dept: CASE MANAGEMENT | Age: 79
End: 2024-07-17

## 2024-07-17 ENCOUNTER — TELEPHONE (OUTPATIENT)
Dept: INTERNAL MEDICINE CLINIC | Facility: CLINIC | Age: 79
End: 2024-07-17

## 2024-07-17 DIAGNOSIS — R51.9 ACUTE NONINTRACTABLE HEADACHE, UNSPECIFIED HEADACHE TYPE: Primary | ICD-10-CM

## 2024-07-17 PROCEDURE — 1111F DSCHRG MED/CURRENT MED MERGE: CPT

## 2024-07-17 RX ORDER — ACETAMINOPHEN 500 MG
500 TABLET ORAL EVERY 6 HOURS PRN
COMMUNITY

## 2024-07-17 NOTE — TELEPHONE ENCOUNTER
Sent as FYI/EARL protocol:    Spoke to patient for Transitions of Care call today. Patient declines ER f/u appt with Dr. Clifford or partners--prefers to f/u with specialists only, at this time.     ER Follow-up appointment needed by 7/22/2024.      BOOK BY DATE: 7/29/2024      Disposition and Plan      Clinical Impression:  1. Acute nonintractable headache, unspecified headache type       Follow-up:  Micheal Clifford MD  [address]  [facility] [address]  [place] [address]  [phone]      For your eye exam:  You will see Dr. Lydia Rahman  On August 12 at 9:15 am     [address]  [address]  [facility] [address] Ph. [phone]     Future Appointments   Date Time Provider Department Center   7/18/2024  2:00 PM Andrew Gaspar MD [id number] [facility]   7/29/2024  1:00 PM [facility] BREAST [facility]   8/9/2024  1:00 PM Vivian Rush APRN [facility]   10/3/2024  2:00 PM Mackenzie Maher MD [facility] Pulm [facility]   11/15/2024 10:30 AM Kathi Sow DO [id number] [facility]

## 2024-07-17 NOTE — TELEPHONE ENCOUNTER
No f/u until August.  Ok to defer f/u with specialist as scheduled or would you like to accommodate pt?  Please advise. Thank you.

## 2024-07-17 NOTE — PROGRESS NOTES
Transitions of Care Navigation  Discharge Date: 7/15/24  Contact Date: 2024      Disposition and Plan      Clinical Impression:  1. Acute nonintractable headache, unspecified headache type         Transitions of Care Assessment:  EARL Initial Assessment    General:  Assessment completed with: Patient  Patient Subjective: Pt feeling better, since ER discharge--still with some fatigue and intermittent headache 4/10--managed well with Tylenol, as needed. Pt denies fever, chills, headache, vision changes, dizziness, nausea, vomiting, diarrhea, chest pain or shortness of breath at this time.  Chief Complaint: Acute nonintractable headache, unspecified headache type  Verify patient name and  with patient/ caregiver: Yes    Hospital Stay/Discharge:  Tell me what you understand of why you were in the hospital or emergency department: Headache and chest pain off and on since 2pm  Prior to leaving the hospital were your Discharge Instructions reviewed with you?: Yes  Did you receive a copy of your written Discharge Instructions?: Yes  What questions do you have about your Discharge Instructions?: none  Do you feel better or worse since you left the hospital or emergency department?: Better    Follow - Up Appointment:  Do you have a follow-up appointment?: Yes  Date: 24  Physician: Dr. Gaspar  Are there any barriers to getting to your follow-up appointment?: No    Home Health/DME:  Prior to leaving the hospital was Home Health (HH) arranged for you?: No     Prior to leaving the hospital or emergency department was Durable Medical Equipment (DME), medical supplies, or infusions arranged for you?: N/A  Are DME/medical supply/infusions needs identified by staff during this assessment?: No     Medications/Diet:  Did any of your medications change, during or after your hospital stay or ED visit?: No  Do you understand what your medications are for and possible side effects?: Yes  Are there any reasons that keep you  from taking your medication as prescribed?: No  Any concerns about medication refills?: No    Were you given a different diet per your Discharge Instructions?: No  Reason: not required     Questions/Concerns:  Do you have any questions or concerns that have not been discussed?: No     EARL Follow-up Assessment    General:  Assessment completed with: Patient  Patient Subjective: Pt feeling better, since ER discharge--still with some fatigue and intermittent headache 4/10--managed well with Tylenol, as needed. Pt denies fever, chills, headache, vision changes, dizziness, nausea, vomiting, diarrhea, chest pain or shortness of breath at this time.  Chief Complaint: Acute nonintractable headache, unspecified headache type  Community Resources: Other ([facility] to see pt 7/26/24)    Progress/Care Plan:  Is the patient progressing as planned?: Yes     Nursing Interventions: Discussed diet, activity, medications and need for f/u visits. Patient declines ER f/u appt with Dr. Clifford or partners--prefers to f/u with specialists only, at this time.  Sent TE to office staff as FYI/EARL protocol.  Patient aware when to contact PCP/specialists and when to seek emergency care. No further questions/concerns at this time.      Medications:  Current Outpatient Medications   Medication Sig Dispense Refill    SYNTHROID 25 MCG Oral Tab Take 1 tablet of 25 mcg on Monday-Thursday. Take 2 tablets of 25 mcg (total 50 mcg) on Friday-Sunday.      naproxen 500 MG Oral Tab Take 1 tablet (500 mg total) by mouth 2 (two) times daily as needed (for pain).      SYMBICORT 160-4.5 MCG/ACT Inhalation Aerosol Inhale 2 puffs into the lungs 2 (two) times daily.      Flaxseed, Linseed, (FLAXSEED OIL OR) Take by mouth.      albuterol 108 (90 Base) MCG/ACT Inhalation Aero Soln Inhale 2 puffs into the lungs every 6 (six) hours as needed for Wheezing. 1 each 1    PATIENT SUPPLIED MEDICATION daily. Gallbladder formula, super colon cleanse      Cholecalciferol (VITAMIN D)  50 MCG (2000 UT) Oral Cap Take by mouth.      Turmeric (QC TUMERIC COMPLEX OR) Take by mouth.      vitamin B-12 50 MCG Oral Tab Take 1 tablet (50 mcg total) by mouth daily.      NON FORMULARY quersetin      Respiratory Therapy Supplies (NEBULIZER/TUBING/MOUTHPIECE) Does not apply Kit Use as directed. 1 each 0    COLLAGEN OR Take by mouth.      Ascorbic Acid (VITAMIN C) 100 MG Oral Tab Take 1 tablet (100 mg total) by mouth daily.      Calcium-Magnesium (GABRIELLA-MAG OR) Take 600 mg by mouth in the morning and 600 mg before bedtime.         Follow-up Appointments:    Follow-up:  Micheal Clifford MD  [address]  [address]  [facility] [address]  [phone]      For your eye exam:  You will see Dr. Lydia Rahman  On August 12 at 9:15 am     [address]  [address]  [facility] [address] Ph. [phone]     Your appointments       Date & Time Appointment Department ([place])    Jul 18, 2024 2:00 PM CDT Follow Up Visit with Andrew Gaspar MD [facility] ([facility])        Jul 29, 2024 1:00 PM CDT MAMMOGRAM BIOPSY STEREO GUIDED with [facility] BREAST BX [facility] ([facility])    You may be subject to a fee if you do not show up for your appointment or you cancel within 24 hours of your appointment.    On the day of your biopsy:    Take Tylenol 1,000mg before you come.  Eat a light meal.  Wear or bring a tight, supportive bra (such as a sports bra) for compression after the biopsy.  If you don't have a sports bra, please bring your most supportive bra.  You DO NOT need a .  Please arrive 30 minutes prior to your scheduled appointment.     If you were unable to meet with the breast coordinator following your exam, we will be contacting you to discuss the biopsy process, provide education and answer any questions you may have.    It is our commitment at [facility] to ensure that this scheduling  process is efficient and timely.  If you have any further questions or concerns, please contact the Breast Care Coordinator Nurse at [phone] (Mon-Fri, 7:30AM-4:00PM).       Aug 09, 2024 1:00 PM CDT Consult with Vivian Rush APRN [facility] ([facility])        Oct 03, 2024 2:00 PM CDT Exam - Establ[facility] ([facility])        Nov 15, 2024 10:30 AM CST Exam - Established with Kathi Sow DO [facility] ([facility])              [facility]  [address]  [facility] [address]  [phone] [facility]  [address]  [facility] [address]  [phone] [facility]  [address]  [facility] [id number] [phone]    [facility]  [address] [facility]  [facility] [address]  [phone] [address]  [facility] [phone]            [facility]  Was TCC Ordered: No    Primary Care Provider (If no TCC appointment)  Does patient already have a PCP appointment scheduled? No  Nurse Care Manager Attempted to schedule PCP office ER Follow-up appointment with patient   -If no appointment scheduled: Explain: pt declines    Specialist  Does the patient have any other follow-up appointment(s) need to be scheduled? No     Book By Date: 7/22/2024

## 2024-07-17 NOTE — TELEPHONE ENCOUNTER
Yes, however, we would need to see her for a hospital follow-up.  Otherwise, this can be ordered by the neurosurgery service.

## 2024-07-17 NOTE — TELEPHONE ENCOUNTER
Received call from Jayjay [facility] nurse. He stated he spoke with Bev yesterday (Chronic care manager, see TE 7/15/24) and Bev asked to schedule f/u visit for pt to be seen at home. Jayjay spoke with the pt yesterday and the pt declined visit and stated she doesn't feel like she needs to be seen this week. Jayjay is scheduled to see pt Friday 7/26/24. He wanted to let us know and to see if we can re certify HHC. Appears HHC was last ordered by neuro. Notified I will clarify if AD will order.     AD - HHC was last ordered by neuro, would re cert have to come from neuro or would we take over HHC orders going forward?

## 2024-07-17 NOTE — TELEPHONE ENCOUNTER
Called patient to advise, she confirms understanding and will schedule with [place] vascular surgery.

## 2024-07-17 NOTE — TELEPHONE ENCOUNTER
Of course would prefer follow-up with our office, in addition to the specialist, however it seems as though she has declined.

## 2024-07-17 NOTE — TELEPHONE ENCOUNTER
[phone]   Called and spoke with Jayjay, RN with HH to inform per PCP would re-certify HH, however, pt needs HFU. Office has already reached out to pt to offer an appt and pt declined.   Alternative, f/u with Neurosurg- Dr. Gaspar. No further questions or concerns. HH verbalized understanding and agreed with POC.

## 2024-07-18 ENCOUNTER — TELEPHONE (OUTPATIENT)
Dept: INTERNAL MEDICINE CLINIC | Facility: CLINIC | Age: 79
End: 2024-07-18

## 2024-07-18 ENCOUNTER — OFFICE VISIT (OUTPATIENT)
Dept: SURGERY | Facility: CLINIC | Age: 79
End: 2024-07-18
Payer: MEDICARE

## 2024-07-18 VITALS
HEART RATE: 79 BPM | HEIGHT: 57 IN | WEIGHT: 101 LBS | SYSTOLIC BLOOD PRESSURE: 126 MMHG | BODY MASS INDEX: 21.79 KG/M2 | DIASTOLIC BLOOD PRESSURE: 84 MMHG

## 2024-07-18 DIAGNOSIS — G96.198 PSEUDOMENINGOCELE: ICD-10-CM

## 2024-07-18 DIAGNOSIS — J44.89 ASTHMA WITH COPD (CHRONIC OBSTRUCTIVE PULMONARY DISEASE) (HCC): Primary | Chronic | ICD-10-CM

## 2024-07-18 PROCEDURE — 99214 OFFICE O/P EST MOD 30 MIN: CPT | Performed by: NEUROLOGICAL SURGERY

## 2024-07-18 PROCEDURE — 1159F MED LIST DOCD IN RCRD: CPT | Performed by: NEUROLOGICAL SURGERY

## 2024-07-18 PROCEDURE — 3074F SYST BP LT 130 MM HG: CPT | Performed by: NEUROLOGICAL SURGERY

## 2024-07-18 PROCEDURE — 1160F RVW MEDS BY RX/DR IN RCRD: CPT | Performed by: NEUROLOGICAL SURGERY

## 2024-07-18 PROCEDURE — 3008F BODY MASS INDEX DOCD: CPT | Performed by: NEUROLOGICAL SURGERY

## 2024-07-18 PROCEDURE — 3079F DIAST BP 80-89 MM HG: CPT | Performed by: NEUROLOGICAL SURGERY

## 2024-07-18 RX ORDER — PREDNISONE 10 MG/1
TABLET ORAL
COMMUNITY
Start: 2024-07-02

## 2024-07-18 NOTE — PROGRESS NOTES
Neurosurgery Clinic Visit  2024    Arianna Colin PCP:  Micheal Clifford MD    1945 MRN [id number]     HISTORY OF PRESENT ILLNESS:  Arianna Colin is a(n) 79 year old female presents for follow-up  She got her myelogram  She notes she been having some headaches in the left side of her head and both temporal after the myelogram  Overall she is complaining of pain in her left upper thoracic and mid thoracic region she denies any pain around her T12/L1 region  Denies any issues going down her legs  She does note she has a little medial thigh pain  She denies any radiculopathy  She is here for follow-up      PHYSICAL EXAMINATION:  Vital Signs:  /84   Pulse 79   Ht 57\"   Wt 101 lb (45.8 kg)   BMI 21.86 kg/m²   Awake alert, orient x 3  Follows commands x 4  Tender mid upper thoracic region, left upper thoracic region      REVIEW OF STUDIES:    CT myelogram was reviewed which shows 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 which is smaller than her last MRI  She is getting contrast in it though  No other lesions noted  Overall her spine looks pretty good      ASSESSMENT and PLAN:  79-year-old female with 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  I do not think this is symptomatic or she not really any symptoms around that area or radiculopathy I can pinpoint to that  I had discussion with her about treatment options  Given that I think this is relatively asymptomatic at recommend we just watch it  We can actually make her leak worse if we were to go operate on it  If she is having symptoms of the leak I be more inclined  I have reached out to my neurointerventional colleagues at this point they did not have any further intervention they would recommend either  She is getting get acupuncture for pain in her upper back  She will see me back as needed  All questions were answered  Patient and family appreciative        Time spent on counseling/coordination of care:  30 Minutes    Total time spent with patient:  30  Minutes      Andrew Gaspar MD   [facility]  7/18/2024  2:31 PM   Dictated but not proofread

## 2024-07-18 NOTE — PATIENT INSTRUCTIONS
Refill policies:    Allow 2-3 business days for refills; controlled substances may take longer.  Contact your pharmacy at least 5 days prior to running out of medication and have them send an electronic request or submit request through the “request refill” option in your [employer] account.  Refills are not addressed on weekends; covering physicians do not authorize routine medications on weekends.  No narcotics or controlled substances are refilled after noon on Fridays or by on call physicians.  By law, narcotics must be electronically prescribed.  A 30 day supply with no refills is the maximum allowed.  If your prescription is due for a refill, you may be due for a follow up appointment.  To best provide you care, patients receiving routine medications need to be seen at least once a year.  Patients receiving narcotic/controlled substance medications need to be seen at least once every 3 months.  In the event that your preferred pharmacy does not have the requested medication in stock (e.g. Backordered), it is your responsibility to find another pharmacy that has the requested medication available.  We will gladly send a new prescription to that pharmacy at your request.    Scheduling Tests:    If your physician has ordered radiology tests such as MRI or CT scans, please contact Central Scheduling at [phone] right away to schedule the test.  Once scheduled, the [facility] Centralized Referral Team will work with your insurance carrier to obtain pre-certification or prior authorization.  Depending on your insurance carrier, approval may take 3-10 days.  It is highly recommended patients assure they have received an authorization before having a test performed.  If test is done without insurance authorization, patient may be responsible for the entire amount billed.      Precertification and Prior Authorizations:  If your physician has recommended that you have a procedure or additional testing performed the [facility]  Centralized Referral Team will contact your insurance carrier to obtain pre-certification or prior authorization.    You are strongly encouraged to contact your insurance carrier to verify that your procedure/test has been approved and is a COVERED benefit.  Although the [facility] Centralized Referral Team does its due diligence, the insurance carrier gives the disclaimer that \"Although the procedure is authorized, this does not guarantee payment.\"    Ultimately the patient is responsible for payment.   Thank you for your understanding in this matter.  Paperwork Completion:  If you require FMLA or disability paperwork for your recovery, please make sure to either drop it off or have it faxed to our office at [phone]. Be sure the form has your name and date of birth on it.  The form will be faxed to our Forms Department and they will complete it for you.  There is a 25$ fee for all forms that need to be filled out.  Please be aware there is a 10-14 day turnaround time.  You will need to sign a release of information (MARC) form if your paperwork does not come with one.  You may call the Forms Department with any questions at [phone].  Their fax number is [phone].

## 2024-07-18 NOTE — PROGRESS NOTES
Established patient:  Reason for follow up:   F/u after CT myelogram     Numeric Rating Scale:        Pain at Present: 8/10-mid back pain      New imaging or testing since your last office visit:    CT spine myelogram DOS 07/11/24

## 2024-07-26 ENCOUNTER — PATIENT OUTREACH (OUTPATIENT)
Dept: CASE MANAGEMENT | Age: 79
End: 2024-07-26

## 2024-07-26 NOTE — PROGRESS NOTES
Left message on mailbox for patient to call nurse care manager back for 7 day f/u transitions of care call.  Nurse care  information [phone] included in message.          Future Appointments   Date Time Provider Department Center   7/29/2024  1:00 PM [facility] BREAST BX [facility]   8/9/2024  1:00 PM Vivian Rush APRN [facility]   10/3/2024  2:00 PM Mackenzie Maher MD [facility]m [facility] [place]   11/15/2024 10:30 AM Kathi Sow DO [id number] [place]

## 2024-07-29 ENCOUNTER — HOSPITAL ENCOUNTER (OUTPATIENT)
Dept: MAMMOGRAPHY | Facility: HOSPITAL | Age: 79
Discharge: HOME | End: 2024-07-29
Attending: INTERNAL MEDICINE
Payer: MEDICARE

## 2024-07-29 DIAGNOSIS — N63.20 MASS OF LEFT BREAST, UNSPECIFIED QUADRANT: ICD-10-CM

## 2024-07-29 PROCEDURE — 19081 BX BREAST 1ST LESION STRTCTC: CPT | Performed by: INTERNAL MEDICINE

## 2024-07-29 PROCEDURE — 19082 BX BREAST ADD LESION STRTCTC: CPT | Performed by: INTERNAL MEDICINE

## 2024-07-29 PROCEDURE — 88305 TISSUE EXAM BY PATHOLOGIST: CPT | Performed by: INTERNAL MEDICINE

## 2024-07-29 NOTE — DISCHARGE INSTRUCTIONS
Discharge Instructions  Stereotactic / Ultrasound / MRI Core Biopsy     Place an ice pack on top of the biopsy site in your bra OR the folds of the Ace wrap for 10-15 minutes every hour until bedtime for your comfort and to decrease bleeding.     Keep your supportive bra OR the Ace wrap in place for 24 hours after your biopsy. This compression decreases bleeding and breast movement for your comfort. Wear a supportive bra for the next couple days for comfort (as well as for sleeping)     No bath or shower during the first 24 hours after biopsy. After this time, you may take a bath or shower. It’s okay if the steri-strips get wet but don’t soak them.   No saunas, hot tubs, or swimming pools until the steri-strips fall off (5-7days).  This prevents infection and allows time for the area to completely close and heal.     DO NOT remove the steri-strips. They will fall off in 5 days to two weeks. If any type of irritation (redness, itching, OR blisters) develops in the area around the steri-strips, remove them gently. Keep the area clean and dry.     It is normal to have mild discomfort and bruising at the biopsy site.      You may take Tylenol as needed for discomfort.     DO NOT participate in strenuous activity (aerobics, heavy lifting, housework, gardening, etc.) 48 hours after your biopsy to prevent bleeding.     You will receive results typically within 2-3 business days. Occasionally, the specimen is sent off-site for a 2nd opinion. You will be notified when this occurs as this will delay your results.     If you have any questions about the procedure or your results, please contact the Breast Care Coordinator Nurse at [phone]. (M-F 7:30-4)    If you are having a MRI Breast Biopsy or an Ultrasound guided biopsy, you will be billed for the biopsy and a unilateral mammogram separately.    A 6-month or one year follow-up Diagnostic Mammogram/Ultrasound will be recommended to document stability of the biopsy  site. It may be scheduled at [facility] or [facility] by calling [phone]. You will need an order for this exam from your referring physician.       5/2024

## 2024-07-30 ENCOUNTER — TELEPHONE (OUTPATIENT)
Dept: MAMMOGRAPHY | Facility: HOSPITAL | Age: 79
End: 2024-07-30

## 2024-07-30 NOTE — PROGRESS NOTES
EARL Follow-up Assessment    General:  Assessment completed with: Patient    Progress/Care Plan:  Is the patient progressing as planned?: Yes  Care Plan Update: pt reports mild intermittent headache, managed well with Tylenol--did see Dr. Clayton 7/18/24 for myelogram f/u  New Care Plan: pt recovering from breast biopsy, yesterday, has upcoming vasc sx appt 8/9/24  Frequency/Follow Up Plan: 14 day call, next week     Notes:  Navigator Notes: Pt continues to improve--denies fever, chills, worsening headache, vision changes, photophobia, dizziness, nausea, vomiting, diarrhea, chest pain or shortness of breath at this time. Appetite good, staying hydrated.

## 2024-07-30 NOTE — TELEPHONE ENCOUNTER
Telephoned Arianna Colin and name,  verified with patient. Notified Arianna Colin of left breast 2 site negative for malignancy biopsy result. Concordance pending. Arianna Colin reports biopsy site is healing well. Patient states that she has some shortness of breath but that she has had this lately. Instructed her to call her physician with her recent unrelated symptoms. Patient also instructed to follow up with her physician for radiologist's future breast imaging recommendations. Pt verbalized understanding and had no further questions at this time.

## 2024-07-31 DIAGNOSIS — R92.8 ABNORMAL MAMMOGRAM: Primary | ICD-10-CM

## 2024-08-01 ENCOUNTER — TELEPHONE (OUTPATIENT)
Dept: INTERNAL MEDICINE CLINIC | Facility: CLINIC | Age: 79
End: 2024-08-01

## 2024-08-02 ENCOUNTER — APPOINTMENT (OUTPATIENT)
Dept: GENERAL RADIOLOGY | Age: 79
End: 2024-08-02
Attending: PHYSICIAN ASSISTANT
Payer: MEDICARE

## 2024-08-02 ENCOUNTER — TELEPHONE (OUTPATIENT)
Dept: INTERNAL MEDICINE CLINIC | Facility: CLINIC | Age: 79
End: 2024-08-02

## 2024-08-02 ENCOUNTER — HOSPITAL ENCOUNTER (OUTPATIENT)
Age: 79
Discharge: HOME | End: 2024-08-02
Payer: MEDICARE

## 2024-08-02 VITALS
RESPIRATION RATE: 18 BRPM | BODY MASS INDEX: 22 KG/M2 | OXYGEN SATURATION: 97 % | TEMPERATURE: 98 F | HEART RATE: 77 BPM | SYSTOLIC BLOOD PRESSURE: 113 MMHG | DIASTOLIC BLOOD PRESSURE: 61 MMHG | WEIGHT: 100 LBS

## 2024-08-02 DIAGNOSIS — R07.9 ACUTE CHEST PAIN: Primary | ICD-10-CM

## 2024-08-02 DIAGNOSIS — R06.00 DYSPNEA, UNSPECIFIED TYPE: ICD-10-CM

## 2024-08-02 LAB
#MXD IC: 0.4 X10ˆ3/UL (ref 0.1–1)
ATRIAL RATE: 81 BPM
BUN BLD-MCNC: 16 MG/DL (ref 7–18)
CHLORIDE BLD-SCNC: 101 MMOL/L (ref 98–112)
CO2 BLD-SCNC: 27 MMOL/L (ref 21–32)
CREAT BLD-MCNC: 0.7 MG/DL
DDIMER WHOLE BLOOD: <200 NG/ML DDU (ref ?–400)
EGFRCR SERPLBLD CKD-EPI 2021: 87 ML/MIN/1.73M2 (ref 60–?)
GLUCOSE BLD-MCNC: 80 MG/DL (ref 70–99)
HCT VFR BLD AUTO: 39.5 %
HCT VFR BLD CALC: 39 %
HGB BLD-MCNC: 12.9 G/DL
ISTAT IONIZED CALCIUM FOR CHEM 8: 1.21 MMOL/L (ref 1.12–1.32)
LYMPHOCYTES # BLD AUTO: 1.9 X10ˆ3/UL (ref 1–4)
LYMPHOCYTES NFR BLD AUTO: 26.2 %
MCH RBC QN AUTO: 30 PG (ref 26–34)
MCHC RBC AUTO-ENTMCNC: 32.7 G/DL (ref 31–37)
MCV RBC AUTO: 91.9 FL (ref 80–100)
MIXED CELL %: 6.1 %
NEUTROPHILS # BLD AUTO: 5 X10ˆ3/UL (ref 1.5–7.7)
NEUTROPHILS NFR BLD AUTO: 67.7 %
P AXIS: 65 DEGREES
P-R INTERVAL: 144 MS
PLATELET # BLD AUTO: 273 X10ˆ3/UL (ref 150–450)
POTASSIUM BLD-SCNC: 3.9 MMOL/L (ref 3.6–5.1)
Q-T INTERVAL: 372 MS
QRS DURATION: 76 MS
QTC CALCULATION (BEZET): 432 MS
R AXIS: 50 DEGREES
RBC # BLD AUTO: 4.3 X10ˆ6/UL
SARS-COV-2 RNA RESP QL NAA+PROBE: NOT DETECTED
SODIUM BLD-SCNC: 139 MMOL/L (ref 136–145)
T AXIS: 71 DEGREES
TROPONIN I BLD-MCNC: <0.02 NG/ML
VENTRICULAR RATE: 81 BPM
WBC # BLD AUTO: 7.3 X10ˆ3/UL (ref 4–11)

## 2024-08-02 PROCEDURE — 80047 BASIC METABLC PNL IONIZED CA: CPT | Performed by: PHYSICIAN ASSISTANT

## 2024-08-02 PROCEDURE — 93000 ELECTROCARDIOGRAM COMPLETE: CPT | Performed by: PHYSICIAN ASSISTANT

## 2024-08-02 PROCEDURE — 71046 X-RAY EXAM CHEST 2 VIEWS: CPT | Performed by: PHYSICIAN ASSISTANT

## 2024-08-02 PROCEDURE — U0002 COVID-19 LAB TEST NON-CDC: HCPCS | Performed by: PHYSICIAN ASSISTANT

## 2024-08-02 PROCEDURE — 84484 ASSAY OF TROPONIN QUANT: CPT | Performed by: PHYSICIAN ASSISTANT

## 2024-08-02 PROCEDURE — 99213 OFFICE O/P EST LOW 20 MIN: CPT | Performed by: PHYSICIAN ASSISTANT

## 2024-08-02 PROCEDURE — 85378 FIBRIN DEGRADE SEMIQUANT: CPT | Performed by: PHYSICIAN ASSISTANT

## 2024-08-02 PROCEDURE — 85025 COMPLETE CBC W/AUTO DIFF WBC: CPT | Performed by: PHYSICIAN ASSISTANT

## 2024-08-02 NOTE — DISCHARGE INSTRUCTIONS
We discussed out limited findings today, we discussed continuing your nebulizer and start prednisone.   If your symptoms persist or worsen all those worsening symptoms we discussed please go immediately to the emergency department  Close follow-up with your primary care doctor
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
## 2024-08-02 NOTE — ED INITIAL ASSESSMENT (HPI)
Patient states 5 days ago had 2 left breast biopsies - since short of breath, right upper chest and right scapula

## 2024-08-02 NOTE — TELEPHONE ENCOUNTER
Received call from Melly, nurse with [facility]. Per Melly, pt had breast bx completed about 3 days ago, pt now reporting increase in breast/back pain. Per their protocols, pt should seek care today. Melly calling to try to schedule pt in our clinic today. Informed her unfortunately we have no appointments until 8/6. Melly stated she will direct pt to .

## 2024-08-09 ENCOUNTER — OFFICE VISIT (OUTPATIENT)
Facility: CLINIC | Age: 79
End: 2024-08-09
Payer: MEDICARE

## 2024-08-09 VITALS — BODY MASS INDEX: 23.73 KG/M2 | HEIGHT: 57 IN | WEIGHT: 110 LBS | RESPIRATION RATE: 20 BRPM

## 2024-08-09 DIAGNOSIS — I83.813 VARICOSE VEINS OF BILATERAL LOWER EXTREMITIES WITH PAIN: Primary | ICD-10-CM
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
## 2024-08-13 ENCOUNTER — PATIENT OUTREACH (OUTPATIENT)
Dept: CASE MANAGEMENT | Age: 79
End: 2024-08-13

## 2024-08-13 NOTE — PROGRESS NOTES
[facility] Graduation Assessment    General:  Assessment completed with: Patient    Care Plan/Instructions:   Care Plan Summary (Recap of navigation period including # of ED & Hospital Admission, and if goals met or unmet): Residential  completed, as of 8/01/24, breast biopsy site healing well--does have some bilateral breast pain--managed well at home with Tylenol. Pt seen in  8/2/24 for shortness of breath--continuing nebulizer, no longer taking Prednisone.    Pt did see vasc sx yesterday. Compression stocking Rx given to pt--pt will f/u with Dr. Clifford's office for referral to get measured for compression stockings. Pt aware of importance of wearing these daily and consistently and periodic leg elevation, walking for exercise, analgesic use, and the avoidance of prolonged standing.  Pt will schedule venous reflux ultrasound, after checking with her insurance.  Pt aware that her insurance company may require a 6-12 week trial period of conservative therapy prior to authorizing venous ablation treatment.  Patient Graduation Instructions (Ongoing barriers to care identified, Areas of Need, Areas of Progress): Pt continues to improve--denies fever, chills, worsening headache, vision changes, photophobia, dizziness, nausea, vomiting, diarrhea, chest pain or shortness of breath at this time. Appetite good, staying hydrated.  Patient aware she needs to schedule appt with Dr. Clifford to discuss disability and other concerns. Pt also aware when to contact PCP/specialists and when to seek emergency care. No further questions/concerns at this time.       Future Appointments   Date Time Provider Department Center   10/3/2024  2:00 PM Mackenzie Maher MD [facility]   11/15/2024 10:30 AM Kathi Sow DO [id number]

## 2024-08-14 ENCOUNTER — TELEPHONE (OUTPATIENT)
Dept: INTERNAL MEDICINE CLINIC | Facility: CLINIC | Age: 79
End: 2024-08-14

## 2024-08-14 NOTE — TELEPHONE ENCOUNTER
Jayjay from [facility] called to advise that he is discharging patient as of 8/3/24.  FYI to Dr Clifford.

## 2024-08-16 ENCOUNTER — TELEPHONE (OUTPATIENT)
Dept: INTERNAL MEDICINE CLINIC | Facility: CLINIC | Age: 79
End: 2024-08-16

## 2024-08-16 NOTE — TELEPHONE ENCOUNTER
Patient has not yet scheduled an appointment with DR MICHELLE ENG for Face to Face to discuss a Motorized Scooter.  Pt was informed by CCM on 8/6/24.    FYI to DR MICHELLE ENG.

## 2024-08-22 ENCOUNTER — TELEPHONE (OUTPATIENT)
Dept: INTERNAL MEDICINE CLINIC | Facility: CLINIC | Age: 79
End: 2024-08-22

## 2024-08-22 NOTE — TELEPHONE ENCOUNTER
Paperwork was received from [facility] for review only. It was placed on BD desk for review only.

## 2024-08-23 ENCOUNTER — OFFICE VISIT (OUTPATIENT)
Dept: INTERNAL MEDICINE CLINIC | Facility: CLINIC | Age: 79
End: 2024-08-23
Payer: MEDICARE

## 2024-08-23 VITALS
HEART RATE: 75 BPM | WEIGHT: 106 LBS | DIASTOLIC BLOOD PRESSURE: 60 MMHG | SYSTOLIC BLOOD PRESSURE: 90 MMHG | BODY MASS INDEX: 22.87 KG/M2 | HEIGHT: 57 IN | OXYGEN SATURATION: 100 % | TEMPERATURE: 97 F

## 2024-08-23 DIAGNOSIS — Z12.11 SCREENING FOR COLON CANCER: ICD-10-CM

## 2024-08-23 DIAGNOSIS — J45.40 MODERATE PERSISTENT ASTHMA WITHOUT COMPLICATION (HCC): ICD-10-CM

## 2024-08-23 DIAGNOSIS — J44.89 ASTHMA WITH COPD (CHRONIC OBSTRUCTIVE PULMONARY DISEASE) (HCC): Chronic | ICD-10-CM

## 2024-08-23 DIAGNOSIS — M81.0 AGE-RELATED OSTEOPOROSIS WITHOUT CURRENT PATHOLOGICAL FRACTURE: ICD-10-CM

## 2024-08-23 DIAGNOSIS — R06.00 DYSPNEA, UNSPECIFIED TYPE: ICD-10-CM

## 2024-08-23 DIAGNOSIS — M79.7 FIBROMYALGIA: Primary | ICD-10-CM

## 2024-08-23 PROCEDURE — 3074F SYST BP LT 130 MM HG: CPT

## 2024-08-23 PROCEDURE — 99215 OFFICE O/P EST HI 40 MIN: CPT

## 2024-08-23 PROCEDURE — 1160F RVW MEDS BY RX/DR IN RCRD: CPT

## 2024-08-23 PROCEDURE — 3078F DIAST BP <80 MM HG: CPT

## 2024-08-23 PROCEDURE — 1159F MED LIST DOCD IN RCRD: CPT

## 2024-08-23 PROCEDURE — 3008F BODY MASS INDEX DOCD: CPT
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
## 2024-08-30 ENCOUNTER — TELEPHONE (OUTPATIENT)
Dept: INTERNAL MEDICINE CLINIC | Facility: CLINIC | Age: 79
End: 2024-08-30

## 2024-08-30 NOTE — TELEPHONE ENCOUNTER
Faxed ov notes 8/23/24 from RUSS GUTIERREZ to [employer] which includes Medical Necessity for the scooter.  Confirmation received.

## 2024-09-13 ENCOUNTER — HOSPITAL ENCOUNTER (OUTPATIENT)
Dept: ULTRASOUND IMAGING | Facility: HOSPITAL | Age: 79
Discharge: HOME | End: 2024-09-13
Payer: MEDICARE

## 2024-09-13 DIAGNOSIS — I83.813 VARICOSE VEINS OF BILATERAL LOWER EXTREMITIES WITH PAIN: ICD-10-CM

## 2024-09-13 PROCEDURE — 93970 EXTREMITY STUDY: CPT

## 2024-09-20 ENCOUNTER — HOSPITAL ENCOUNTER (EMERGENCY)
Age: 79
Discharge: HOME | End: 2024-09-20
Attending: EMERGENCY MEDICINE
Payer: MEDICARE

## 2024-09-20 ENCOUNTER — TELEPHONE (OUTPATIENT)
Dept: INTERNAL MEDICINE CLINIC | Facility: CLINIC | Age: 79
End: 2024-09-20

## 2024-09-20 VITALS
TEMPERATURE: 100 F | HEART RATE: 90 BPM | OXYGEN SATURATION: 98 % | DIASTOLIC BLOOD PRESSURE: 65 MMHG | BODY MASS INDEX: 20.16 KG/M2 | SYSTOLIC BLOOD PRESSURE: 117 MMHG | WEIGHT: 100 LBS | RESPIRATION RATE: 18 BRPM | HEIGHT: 59 IN

## 2024-09-20 DIAGNOSIS — U07.1 COVID-19 VIRUS INFECTION: Primary | ICD-10-CM

## 2024-09-20 LAB
POCT INFLUENZA A: NEGATIVE
POCT INFLUENZA B: NEGATIVE
SARS-COV-2 RNA RESP QL NAA+PROBE: DETECTED

## 2024-09-20 PROCEDURE — 99283 EMERGENCY DEPT VISIT LOW MDM: CPT

## 2024-09-20 PROCEDURE — 87502 INFLUENZA DNA AMP PROBE: CPT | Performed by: EMERGENCY MEDICINE

## 2024-09-20 PROCEDURE — 87502 INFLUENZA DNA AMP PROBE: CPT

## 2024-09-20 NOTE — ED INITIAL ASSESSMENT (HPI)
Pt endorses cough, fever to 102 at home, chills and body aches x 1 week. Denies V/D and sick contacts.
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
## 2024-09-20 NOTE — TELEPHONE ENCOUNTER
Received denial for Scooter as pt does not meet Medicare guidelines.    LEFT MESSAGE at [phone] vm to call back office RN to discuss at [phone].

## 2024-09-23 ENCOUNTER — PATIENT OUTREACH (OUTPATIENT)
Dept: CASE MANAGEMENT | Age: 79
End: 2024-09-23

## 2024-09-23 DIAGNOSIS — Z02.9 ENCOUNTERS FOR UNSPECIFIED ADMINISTRATIVE PURPOSE: Primary | ICD-10-CM

## 2024-09-23 PROCEDURE — 1111F DSCHRG MED/CURRENT MED MERGE: CPT

## 2024-09-23 NOTE — PROGRESS NOTES
Transitions of Care Navigation  Discharge Date: 24  Contact Date: 2024    Transitions of Care Assessment:  EARL Initial Assessment    General:  Assessment completed with: Patient  Patient Subjective: NCM spoke with patient states she continues to have nasal drip, she is sneezing alot. She reports her cough has improved, has headache on and off, rates at 8/10 when does have a headache. Patient denies any fevers, has chills here and there, slight abdominal pain, denies any shortness of breath or vomiting. Patient states feels little lightheaded, does not have much appetite and has not been sleeping well. She reports nausea, no vomiting.  Chief Complaint: COVID-19 virus infection  Verify patient name and  with patient/ caregiver: Yes    Hospital Stay/Discharge:  Tell me what you understand of why you were in the hospital or emergency department: I was having cough, fever, chills for a week.  Prior to leaving the hospital were your Discharge Instructions reviewed with you?: Yes  Did you receive a copy of your written Discharge Instructions?: Yes  What questions do you have about your Discharge Instructions?: Patient denies any questions.  Do you feel better or worse since you left the hospital or emergency department?: Same    Follow - Up Appointment:  Do you have a follow-up appointment?: Yes  Date: 24  Physician: PCP-Dr. Taylor- Virtual visit  Are there any barriers to getting to your follow-up appointment?: No    Home Health/DME:  Prior to leaving the hospital was Home Health (HH) arranged for you?: N/A  Are HH needs identified by staff during the assessment?: No     Prior to leaving the hospital or emergency department was Durable Medical Equipment (DME), medical supplies, or infusions arranged for you?: N/A  Are DME/medical supply/infusions needs identified by staff during this assessment?: No     Medications/Diet:  Did any of your medications change, during or after your hospital stay or ED  visit?: No  Do you understand what your medications are for and possible side effects?: Yes  Are there any reasons that keep you from taking your medication as prescribed?: No  Any concerns about medication refills?: No    Were you given a different diet per your Discharge Instructions?: No  Reason: N/A     Questions/Concerns:  Do you have any questions or concerns that have not been discussed?: No   EARL Follow-up Assessment    General:  Assessment completed with: Patient  Patient Subjective: NCM spoke with patient states she continues to have nasal drip, she is sneezing alot. She reports her cough has improved, has headache on and off, rates at 8/10 when does have a headache. Patient denies any fevers, has chills here and there, slight abdominal pain, denies any shortness of breath or vomiting. Patient states feels little lightheaded, does not have much appetite and has not been sleeping well. She reports nausea, no vomiting.  Chief Complaint: COVID-19 virus infection  Community Resources: Other (none)    Nursing Interventions:  All discharge instructions reviewed with the patient. Reviewed when to call MD vs when to call 911 or go the ED. Educated patient on the importance of taking all meds as prescribed as well as close f/u with PCP/specialists. Pt verbalized understanding and will contact the office with any further questions or concerns. Patient denies fevers, chills on and off,has nausea, no vomiting, shortness of breath, chest pain-feels like soreness from coughing, or any other symptoms at this time.  [facility] scheduled patient for ED follow up with PCP. [facility] provided contact information for any further questions/concerns. Patient verbalized understanding and agreeable.           Medications:Reviewed medication list.  Medications are up to date.  Current Outpatient Medications   Medication Sig Dispense Refill    SYNTHROID 25 MCG Oral Tab Take 1 tablet (25 mcg total) Monday- Thursday. Take 2 tablets (50 mcg total)  Friday- Sunday. 90 day supply 120 tablet 0    acetaminophen 500 MG Oral Tab Take 1 tablet (500 mg total) by mouth every 6 (six) hours as needed for Pain.      SYMBICORT 160-4.5 MCG/ACT Inhalation Aerosol Inhale 2 puffs into the lungs 2 (two) times daily.      Flaxseed, Linseed, (FLAXSEED OIL OR) Take by mouth.      albuterol 108 (90 Base) MCG/ACT Inhalation Aero Soln Inhale 2 puffs into the lungs every 6 (six) hours as needed for Wheezing. 1 each 1    Cholecalciferol (VITAMIN D) 50 MCG (2000 UT) Oral Cap Take by mouth.      Turmeric (QC TUMERIC COMPLEX OR) Take by mouth.      vitamin B-12 50 MCG Oral Tab Take 1 tablet (50 mcg total) by mouth daily.      Respiratory Therapy Supplies (NEBULIZER/TUBING/MOUTHPIECE) Does not apply Kit Use as directed. 1 each 0    COLLAGEN OR Take by mouth.      Ascorbic Acid (VITAMIN C) 100 MG Oral Tab Take 1 tablet (100 mg total) by mouth daily.      Calcium-Magnesium (GABRIELLA-MAG OR) Take 600 mg by mouth in the morning and 600 mg before bedtime.      predniSONE 10 MG Oral Tab  (Patient not taking: Reported on 8/23/2024)      SYNTHROID 25 MCG Oral Tab Take 1 tablet of 25 mcg on Monday-Thursday. Take 2 tablets of 25 mcg (total 50 mcg) on Friday-Sunday.      naproxen 500 MG Oral Tab Take 1 tablet (500 mg total) by mouth 2 (two) times daily as needed (for pain). (Patient not taking: Reported on 8/23/2024)      PATIENT SUPPLIED MEDICATION daily. Gallbladder formula, super colon cleanse (Patient not taking: Reported on 9/20/2024)      NON FORMULARY quersetin (Patient not taking: Reported on 8/23/2024)           Follow-up Appointments:  Your appointments       Date & Time Appointment Department ([place])    Sep 26, 2024 11:20 AM CDT Video Hospital Follow Up with Francis Taylor DO [facility], [address] [place] [address] ([facility])        Oct 03, 2024 2:00 PM CDT Exam - Established with Mackenzie Maher MD [facility], [facility]  [place] ([facility])        Nov 15, 2024 10:30 AM CST Exam - Established with Kathi Sow DO [facility] ([facility])              [facility], [address] [place] [address]  [facility] [place]  [address]  [facility] [place] [id number] [phone] [address], [address]  [facility] [id number]  [phone] [address], [facility] [address]  [facility] [address]  [phone]            [facility]  Was TCC Ordered: No      Primary Care Provider (If no TCC appointment)  Does patient already have a PCP appointment scheduled? Yes  Nurse Care Manager Scheduled PCP office ER Follow-up appointment with patient       Specialist  Does the patient have any other follow-up appointment(s) need to be scheduled? No      [x]  Patient verbally agrees to additional follow-up calls from Nurse Care Manager    Book By Date: 09/27/24

## 2024-09-26 ENCOUNTER — TELEMEDICINE (OUTPATIENT)
Dept: INTERNAL MEDICINE CLINIC | Facility: CLINIC | Age: 79
End: 2024-09-26
Payer: MEDICARE

## 2024-09-26 DIAGNOSIS — R11.0 NAUSEA: ICD-10-CM

## 2024-09-26 DIAGNOSIS — U07.1 COVID-19: Primary | ICD-10-CM

## 2024-09-26 DIAGNOSIS — J45.40 MODERATE PERSISTENT ASTHMA WITHOUT COMPLICATION (HCC): ICD-10-CM

## 2024-09-26 PROCEDURE — 1160F RVW MEDS BY RX/DR IN RCRD: CPT | Performed by: INTERNAL MEDICINE

## 2024-09-26 PROCEDURE — 99442 PHONE E/M BY PHYS 11-20 MIN: CPT | Performed by: INTERNAL MEDICINE

## 2024-09-26 PROCEDURE — 1159F MED LIST DOCD IN RCRD: CPT | Performed by: INTERNAL MEDICINE
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
## 2024-09-30 ENCOUNTER — PATIENT OUTREACH (OUTPATIENT)
Dept: CASE MANAGEMENT | Age: 79
End: 2024-09-30

## 2024-10-01 ENCOUNTER — NURSE TRIAGE (OUTPATIENT)
Dept: INTERNAL MEDICINE CLINIC | Facility: CLINIC | Age: 79
End: 2024-10-01

## 2024-10-01 NOTE — PROGRESS NOTES
Transitions of Care Navigation  Discharge Date: 9/20/24  Contact Date: 10/1/2024    Transitions of Care Assessment:  EARL Follow-up Assessment    General:  Assessment completed with: Patient  Patient Subjective: NCM spoke with patient states she continues to have slight nasal drip and feels fatigued. Patient is feeling drained more than usual. she also reports nausea. She denies any fevers, chills, vomiting, or any others. She reports feeling food sitting in her stomach after eating.  Chief Complaint: COVID-19 virus infection  Community Resources: Other (None)    Progress/Care Plan:  Is the patient progressing as planned?: Yes  Care Plan Update: Patient reports feeling better, however continues to have slight nasal drip and feeling tired. Patient denies cough, fevers, chills, vomiting, shortness of breath or chest pain.  New Care Plan: Patient will be completing blood work for thyroid levels. she will follow up with endocrinology and PCP.  Frequency/Follow Up Plan: 7 day follow up     Notes:  Navigator Notes: Patient continues to improve, she denies any fevers, chills, vomiting or any others. NCM sent TE to PCP office regarding nausea, patient is requesting a GI referral. NCM advised patient should repeat blood work for thyroid levels as well and follow up with endocrinology.     Nursing Interventions:  All discharge instructions reviewed with the patient. Reviewed when to call MD vs when to call 911 or go the ED. Educated patient on the importance of taking all meds as prescribed as well as close f/u with PCP/specialists. Pt verbalized understanding and will contact the office with any further questions or concerns. Patient denies fevers, chills, nausea, vomiting, shortness of breath, chest pain, or any other symptoms at this time.  DEBI sent message to PCP office re: nausea and GI referral. NCM provided contact information for any further questions/concerns. Patient verbalized understanding and agreeable.          Medications:Reviewed medication list.  Medications are up to date. No changes  Current Outpatient Medications   Medication Sig Dispense Refill    SYNTHROID 25 MCG Oral Tab Take 1 tablet (25 mcg total) Monday- Thursday. Take 2 tablets (50 mcg total) Friday- Sunday. 90 day supply 120 tablet 0    predniSONE 10 MG Oral Tab  (Patient not taking: Reported on 8/23/2024)      acetaminophen 500 MG Oral Tab Take 1 tablet (500 mg total) by mouth every 6 (six) hours as needed for Pain.      SYNTHROID 25 MCG Oral Tab Take 1 tablet of 25 mcg on Monday-Thursday. Take 2 tablets of 25 mcg (total 50 mcg) on Friday-Sunday.      naproxen 500 MG Oral Tab Take 1 tablet (500 mg total) by mouth 2 (two) times daily as needed (for pain). (Patient not taking: Reported on 8/23/2024)      SYMBICORT 160-4.5 MCG/ACT Inhalation Aerosol Inhale 2 puffs into the lungs 2 (two) times daily.      Flaxseed, Linseed, (FLAXSEED OIL OR) Take by mouth.      albuterol 108 (90 Base) MCG/ACT Inhalation Aero Soln Inhale 2 puffs into the lungs every 6 (six) hours as needed for Wheezing. 1 each 1    PATIENT SUPPLIED MEDICATION daily. Gallbladder formula, super colon cleanse (Patient not taking: Reported on 9/20/2024)      Cholecalciferol (VITAMIN D) 50 MCG (2000 UT) Oral Cap Take by mouth.      Turmeric (QC TUMERIC COMPLEX OR) Take by mouth.      vitamin B-12 50 MCG Oral Tab Take 1 tablet (50 mcg total) by mouth daily.      NON FORMULARY quersetin (Patient not taking: Reported on 8/23/2024)      Respiratory Therapy Supplies (NEBULIZER/TUBING/MOUTHPIECE) Does not apply Kit Use as directed. 1 each 0    COLLAGEN OR Take by mouth.      Ascorbic Acid (VITAMIN C) 100 MG Oral Tab Take 1 tablet (100 mg total) by mouth daily.      Calcium-Magnesium (GABRIELLA-MAG OR) Take 600 mg by mouth in the morning and 600 mg before bedtime.

## 2024-10-01 NOTE — TELEPHONE ENCOUNTER
NCM spoke with patient states she is feeling nauseas since testing positive for COVID and food is just sitting in her stomach. Patient is requesting a referral to GI. Please advise.

## 2024-10-01 NOTE — TELEPHONE ENCOUNTER
Action Requested: Summary for Provider     []  Critical Lab, Recommendations Needed  [] Need Additional Advice  []   FYI    []   Need Orders  [] Need Medications Sent to Pharmacy  []  Other     SUMMARY: Called and spoke to pt. Per pt, she tested pos for covid on 9/20. Ever since, she has had decrease in appetite, she feels full/nausea after eating so she does not want to eat. Denies vomiting. Other lingering symptom is fatigue. Offered follow-up appointment, pt agreeable. Appointment scheduled for 10/4 with Sabina Hawyood. Advised to call with any changes in the meantime. Encouraged small/more frequent meals vs larger meals.     Reason for call: Condition Update  Onset: 9/20      Reason for Disposition   PERSISTING SYMPTOMS OF COVID-19 and symptoms SAME and medical visit for COVID-19 in past 2 weeks    Protocols used: Coronavirus (COVID-19) Persisting Symptoms Follow-up Call-[facility]

## 2024-10-03 ENCOUNTER — LAB ENCOUNTER (OUTPATIENT)
Dept: LAB | Age: 79
End: 2024-10-03
Attending: STUDENT IN AN ORGANIZED HEALTH CARE EDUCATION/TRAINING PROGRAM
Payer: MEDICARE

## 2024-10-03 ENCOUNTER — OFFICE VISIT (OUTPATIENT)
Facility: CLINIC | Age: 79
End: 2024-10-03
Payer: MEDICARE

## 2024-10-03 VITALS
DIASTOLIC BLOOD PRESSURE: 52 MMHG | BODY MASS INDEX: 20.76 KG/M2 | OXYGEN SATURATION: 97 % | HEIGHT: 59 IN | HEART RATE: 59 BPM | WEIGHT: 103 LBS | SYSTOLIC BLOOD PRESSURE: 100 MMHG | RESPIRATION RATE: 16 BRPM

## 2024-10-03 DIAGNOSIS — E03.9 HYPOTHYROIDISM, UNSPECIFIED TYPE: ICD-10-CM

## 2024-10-03 DIAGNOSIS — J45.901 MODERATE ASTHMA WITH ACUTE EXACERBATION, UNSPECIFIED WHETHER PERSISTENT (HCC): Primary | ICD-10-CM

## 2024-10-03 DIAGNOSIS — E06.3 HASHIMOTO'S DISEASE: ICD-10-CM

## 2024-10-03 DIAGNOSIS — R06.09 DYSPNEA ON EXERTION: ICD-10-CM

## 2024-10-03 LAB
T4 FREE SERPL-MCNC: 1.3 NG/DL (ref 0.8–1.7)
TSI SER-ACNC: 2.38 MIU/ML (ref 0.55–4.78)

## 2024-10-03 PROCEDURE — 84443 ASSAY THYROID STIM HORMONE: CPT

## 2024-10-03 PROCEDURE — 1160F RVW MEDS BY RX/DR IN RCRD: CPT | Performed by: INTERNAL MEDICINE

## 2024-10-03 PROCEDURE — 84439 ASSAY OF FREE THYROXINE: CPT

## 2024-10-03 PROCEDURE — 1159F MED LIST DOCD IN RCRD: CPT | Performed by: INTERNAL MEDICINE

## 2024-10-03 PROCEDURE — 3008F BODY MASS INDEX DOCD: CPT | Performed by: INTERNAL MEDICINE

## 2024-10-03 PROCEDURE — 3078F DIAST BP <80 MM HG: CPT | Performed by: INTERNAL MEDICINE

## 2024-10-03 PROCEDURE — 99214 OFFICE O/P EST MOD 30 MIN: CPT | Performed by: INTERNAL MEDICINE

## 2024-10-03 PROCEDURE — 3074F SYST BP LT 130 MM HG: CPT | Performed by: INTERNAL MEDICINE

## 2024-10-03 PROCEDURE — 36415 COLL VENOUS BLD VENIPUNCTURE: CPT
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
## 2024-10-03 NOTE — PATIENT INSTRUCTIONS
Plan- plan to resume SYmbicort 2 uffs twice a day if you get sick          - continue rescue inhaler -- albuterol - 2 puffs every 4 hours as needed   - call if acidil is not strong enough to fix acid reflux /swallowing problem                   - see me  In 4 months   -    Mackenzie Maher MD  Pulmonary Medicine  10/3/2024

## 2024-10-04 ENCOUNTER — OFFICE VISIT (OUTPATIENT)
Dept: INTERNAL MEDICINE CLINIC | Facility: CLINIC | Age: 79
End: 2024-10-04
Payer: MEDICARE

## 2024-10-04 VITALS
TEMPERATURE: 97 F | HEIGHT: 59 IN | WEIGHT: 103 LBS | DIASTOLIC BLOOD PRESSURE: 70 MMHG | RESPIRATION RATE: 18 BRPM | SYSTOLIC BLOOD PRESSURE: 110 MMHG | HEART RATE: 78 BPM | OXYGEN SATURATION: 98 % | BODY MASS INDEX: 20.76 KG/M2

## 2024-10-04 DIAGNOSIS — J44.89 ASTHMA WITH COPD (CHRONIC OBSTRUCTIVE PULMONARY DISEASE) (HCC): Chronic | ICD-10-CM

## 2024-10-04 DIAGNOSIS — E03.9 ACQUIRED HYPOTHYROIDISM: ICD-10-CM

## 2024-10-04 DIAGNOSIS — K63.5 POLYP OF COLON, UNSPECIFIED PART OF COLON, UNSPECIFIED TYPE: ICD-10-CM

## 2024-10-04 DIAGNOSIS — R10.13 EPIGASTRIC PAIN: Primary | ICD-10-CM

## 2024-10-04 DIAGNOSIS — N60.01 BREAST CYST, RIGHT: ICD-10-CM

## 2024-10-04 DIAGNOSIS — N64.4 BREAST PAIN IN FEMALE: ICD-10-CM

## 2024-10-04 PROCEDURE — 3078F DIAST BP <80 MM HG: CPT

## 2024-10-04 PROCEDURE — 3074F SYST BP LT 130 MM HG: CPT

## 2024-10-04 PROCEDURE — 3008F BODY MASS INDEX DOCD: CPT

## 2024-10-04 PROCEDURE — 1170F FXNL STATUS ASSESSED: CPT

## 2024-10-04 PROCEDURE — 99214 OFFICE O/P EST MOD 30 MIN: CPT
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
## 2024-10-06 ENCOUNTER — TELEPHONE (OUTPATIENT)
Dept: FAMILY MEDICINE CLINIC | Facility: CLINIC | Age: 79
End: 2024-10-06

## 2024-10-06 ENCOUNTER — OFFICE VISIT (OUTPATIENT)
Dept: FAMILY MEDICINE CLINIC | Facility: CLINIC | Age: 79
End: 2024-10-06
Payer: MEDICARE

## 2024-10-06 VITALS
DIASTOLIC BLOOD PRESSURE: 49 MMHG | RESPIRATION RATE: 18 BRPM | TEMPERATURE: 97 F | SYSTOLIC BLOOD PRESSURE: 114 MMHG | HEART RATE: 76 BPM | WEIGHT: 103 LBS | OXYGEN SATURATION: 96 % | BODY MASS INDEX: 22.22 KG/M2 | HEIGHT: 57 IN

## 2024-10-06 DIAGNOSIS — U07.1 COVID: Primary | ICD-10-CM

## 2024-10-06 DIAGNOSIS — R53.83 LOW ENERGY: ICD-10-CM

## 2024-10-06 PROCEDURE — 1159F MED LIST DOCD IN RCRD: CPT | Performed by: PHYSICIAN ASSISTANT

## 2024-10-06 PROCEDURE — 1160F RVW MEDS BY RX/DR IN RCRD: CPT | Performed by: PHYSICIAN ASSISTANT

## 2024-10-06 PROCEDURE — 3078F DIAST BP <80 MM HG: CPT | Performed by: PHYSICIAN ASSISTANT

## 2024-10-06 PROCEDURE — 3008F BODY MASS INDEX DOCD: CPT | Performed by: PHYSICIAN ASSISTANT

## 2024-10-06 PROCEDURE — 99213 OFFICE O/P EST LOW 20 MIN: CPT | Performed by: PHYSICIAN ASSISTANT

## 2024-10-06 PROCEDURE — 3074F SYST BP LT 130 MM HG: CPT | Performed by: PHYSICIAN ASSISTANT
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
## 2024-10-06 NOTE — PATIENT INSTRUCTIONS
Rest   Push fluids   Tylenol OTC as needed for pain  Claritin OTC once daily for nasal drainage  Saline nasal spray to thin nasal secretions    Flonase 1 spray each nostril twice daily for sinus pressure   Please follow up with PCP if no improvement or if symptoms worsen

## 2024-10-06 NOTE — TELEPHONE ENCOUNTER
Called patient back.  Patient is wondering why she was not retested for Covid at her visit.  Discussed we do not routinely retest for Covid as it is possible to test positive for 90 days. Discussed CDC quarantine guidelines.

## 2024-10-07 ENCOUNTER — TELEPHONE (OUTPATIENT)
Facility: CLINIC | Age: 79
End: 2024-10-07

## 2024-10-08 ENCOUNTER — PATIENT OUTREACH (OUTPATIENT)
Dept: CASE MANAGEMENT | Age: 79
End: 2024-10-08

## 2024-10-08 NOTE — PROGRESS NOTES
Transitions of Care Navigation  Discharge Date: 9/20/24  Contact Date: 10/8/2024    Transitions of Care Assessment:  EARL Follow-up Assessment    General:  Assessment completed with: Patient  Patient Subjective: NCM spoke with patient states has not been feeling too good. Patient reports a headache since this morning on the left side of her head over her left eye. Patient states has been eating soups. Patient denies any abdominal pain or diarrhea, is constipated. Patient is taking a stool softener. Patient feels very tired, no shortness of breath, no chest pain, no fevers, no chills.  Chief Complaint: COVID-19  Community Resources: Other (none)    Progress/Care Plan:  Is the patient progressing as planned?: Yes  Care Plan Update: Patient states has not been feeling too good. Patient reports a headache since this morning on the left side of her head over her left eye. Patient states has been eating soups. Patient denies any abdominal pain or diarrhea, is constipated. Patient feels very tired, no shortness of breath, no chest pain.  New Care Plan: Patient will continue to monitor symptoms, she will seek immediate medical attention if worsening symptoms.  Frequency/Follow Up Plan: 14 day follow up     Notes:  Navigator Notes: Patient will continue to push fluids, she is taking stool softener for constipation. She get as much rest as possible.     Nursing Interventions:  All discharge instructions reviewed with the patient. Reviewed when to call MD vs when to call 911 or go the ED. Educated patient on the importance of taking all meds as prescribed as well as close f/u with PCP/specialists. Pt verbalized understanding and will contact the office with any further questions or concerns. Patient denies fevers, chills, nausea, vomiting, shortness of breath, chest pain, or any other symptoms at this time.   NCM provided contact information for any further questions/concerns. Patient verbalized understanding and agreeable.            Medications:No medication changes.   Current Outpatient Medications   Medication Sig Dispense Refill    SYNTHROID 25 MCG Oral Tab Take 1 tablet (25 mcg total) Monday- Thursday. Take 2 tablets (50 mcg total) Friday- Sunday. 90 day supply 120 tablet 0    acetaminophen 500 MG Oral Tab Take 1 tablet (500 mg total) by mouth every 6 (six) hours as needed for Pain.      SYNTHROID 25 MCG Oral Tab Take 1 tablet of 25 mcg on Monday-Thursday. Take 2 tablets of 25 mcg (total 50 mcg) on Friday-Sunday.      SYMBICORT 160-4.5 MCG/ACT Inhalation Aerosol Inhale 2 puffs into the lungs 2 (two) times daily.      Flaxseed, Linseed, (FLAXSEED OIL OR) Take by mouth.      albuterol 108 (90 Base) MCG/ACT Inhalation Aero Soln Inhale 2 puffs into the lungs every 6 (six) hours as needed for Wheezing. 1 each 1    Cholecalciferol (VITAMIN D) 50 MCG (2000 UT) Oral Cap Take by mouth.      Turmeric (QC TUMERIC COMPLEX OR) Take by mouth.      vitamin B-12 50 MCG Oral Tab Take 1 tablet (50 mcg total) by mouth daily.      Respiratory Therapy Supplies (NEBULIZER/TUBING/MOUTHPIECE) Does not apply Kit Use as directed. 1 each 0    COLLAGEN OR Take by mouth.      Ascorbic Acid (VITAMIN C) 100 MG Oral Tab Take 1 tablet (100 mg total) by mouth daily.      Calcium-Magnesium (GABRIELLA-MAG OR) Take 600 mg by mouth in the morning and 600 mg before bedtime.

## 2024-10-10 ENCOUNTER — PATIENT OUTREACH (OUTPATIENT)
Dept: CASE MANAGEMENT | Age: 79
End: 2024-10-10

## 2024-10-10 DIAGNOSIS — E06.3 HASHIMOTO'S DISEASE: ICD-10-CM

## 2024-10-10 DIAGNOSIS — F41.9 ANXIETY: ICD-10-CM

## 2024-10-10 DIAGNOSIS — M19.041 PRIMARY OSTEOARTHRITIS OF BOTH HANDS: ICD-10-CM

## 2024-10-10 DIAGNOSIS — E03.9 ACQUIRED HYPOTHYROIDISM: Primary | ICD-10-CM

## 2024-10-10 DIAGNOSIS — M50.30 DDD (DEGENERATIVE DISC DISEASE), CERVICAL: ICD-10-CM

## 2024-10-10 DIAGNOSIS — J44.89 ASTHMA WITH COPD (CHRONIC OBSTRUCTIVE PULMONARY DISEASE) (HCC): ICD-10-CM

## 2024-10-10 DIAGNOSIS — M19.042 PRIMARY OSTEOARTHRITIS OF BOTH HANDS: ICD-10-CM

## 2024-10-10 NOTE — PROGRESS NOTES
Spoke to Arianna for [facility] -  monthly call     Updates to patient care team/ comments: Up-to-date    Patient reported changes in medications: None  Med Adherence  Comment: Taking as directed    Health Maintenance:  Reviewed with patient.  Health Maintenance   Topic Date Due    Pneumococcal Vaccine: 65+ Years (1 of 2 - PCV) Never done    Zoster Vaccines (1 of 2) Never done    Colorectal Cancer Screening  01/01/2023    COVID-19 Vaccine (1 - 2023-24 season) Never done    Influenza Vaccine (1) Never done    DEXA Scan  Completed    MA Annual Health Assessment  Completed    Annual Depression Screening  Completed    Fall Risk Screening (Annual)  Completed     Patient current concerns:   The patient was hospitalized on 9/20/24 at [facility] after experiencing a fever of 102 degrees, chills and body aches at home. She was diagnosed with COVID-19.  had a follow-up appointment on 9/26/24. Although the patient reports feeling better,she does mention experiencing Fatigue, headaches, and a cough that is triggered by a tickle in the back of her throat. She reports that her asthma is stable.     The patient saw LIN Hernandez from Vascular Surgery, for bilateral leg pain, swelling and cramping, which she has experienced for six months. She was advised to wear compression stockings daily, An ultrasound was performed on 9/13/24 and based on the results, only conservative treatment is recommended at this time. The patient states she will need a referral from Dr. Clifford for the compression stockings. [facility] will assist in obtaining the referral.     The patient is also asking [facility]'s assistance with scheduling an appointment with Dr. Hogan to discuss colonoscopy. She states she has concerns and will feel better if she can speak with Dr. Hogna before she schedules her colonoscopy. [facility] will assist with scheduling appointment.       Goals/Action Plan:    Active goal from previous outreach: the patient wants to stay healthy  and active.     Patient reported progress towards goals: The patient continues to work towards goals.  What: The patient was hospitalized on 9/20/24 at [facility] after experiencing a fever of 102 degrees, chills and body aches at home. She was diagnosed with COVID-19.  had a follow-up appointment on 9/26/24. Although the patient reports feeling better,she does mention experiencing Fatigue, headaches, and a cough that is triggered by a tickle in the back of her throat. She reports that her asthma is stable.   Where/When/How: The patient is also asking [facility]'s assistance with scheduling an appointment with Dr. Hogan to discuss colonoscopy. She states she has concerns and will feel better if she can speak with Dr. Hogan before she schedules her colonoscopy. [facility] will assist with scheduling appointment.     Patient Reported Barriers and Concerns: The patient saw LIN Hernandez from [facility], for bilateral leg pain, swelling and cramping, which she has experienced for six months. She was advised to wear compression stockings daily, An ultrasound was performed on 9/13/24 and based on the results, only conservative treatment is recommended at this time.  Plan for overcoming barriers: The patient states she will need a referral from Dr. Clifford for the compression stockings. [facility] will assist in obtaining the referral.       Care managers interventions:    These interventions aim to enhance patient knowledge, improve their ability to manage their health, and ultimately lead to better health outcomes.     Self care: Encouraged the patient to take time to do the things the love and enjoy.    Preventive Health Education: The patient was encouraged to adhere to health screenings, vaccinations , tests, and scheduled appointments.     Coordinated follow up appointment with Dr. Hogan on 11/26/24 @1:40pm per patient's request.     Resources: needs referral for compression stockings - request has been  sent to Dr. Clifford       Appointments reviewed with patient.   Future Appointments   Date Time Provider Department Center   11/15/2024 10:30 AM Kathi Sow DO [id number]   2/5/2025  2:00 PM Mackenzie Maher MD [facility]        Next Care Manager Follow Up Date: 1 Month     Reason For Follow Up: review progress and or barriers towards patient's goals.     Time Spent This Encounter Total: 32 minutes medical record review, telephone communication, care plan updates where needed, education, goals, and action plan recreation/update. Provided acknowledgment and validation to patient's concerns.   Monthly Minute Total including today:  32 minutes  Physical assessment, complete health history, and need for CCM established by Micheal Clifford MD.

## 2024-10-14 ENCOUNTER — TELEPHONE (OUTPATIENT)
Dept: INTERNAL MEDICINE CLINIC | Facility: CLINIC | Age: 79
End: 2024-10-14

## 2024-10-14 NOTE — TELEPHONE ENCOUNTER
I have not seen the patient for her last six visits. Can we please forward to the provider she is following with more regularly? Thanks

## 2024-10-14 NOTE — TELEPHONE ENCOUNTER
The patient saw LIN Hernandez from [facility], for bilateral leg pain, swelling and cramping, which she has experienced for six months. She was advised to wear compression stockings daily, An ultrasound was performed on 9/13/24 and based on the results, only conservative treatment is recommended at this time.       The patient states she will need a referral from Dr. Clifford for the compression stockings.     Please advise

## 2024-10-15 ENCOUNTER — PATIENT OUTREACH (OUTPATIENT)
Dept: CASE MANAGEMENT | Age: 79
End: 2024-10-15

## 2024-10-15 NOTE — TELEPHONE ENCOUNTER
Please have their office fax or send us note on order needs for us to send to insurance for coverage. Patient has HMO.

## 2024-10-15 NOTE — PROGRESS NOTES
Transitions of Care Navigation  Discharge Date: 9/20/24  Contact Date: 10/15/2024    Transitions of Care Assessment:  EARL Follow-up Assessment    General:  Assessment completed with: Patient  Patient Subjective: NCM spoke with patient states she is feeling okay. Patient reports feeling tired. Patient feels tired. She states has sharp pain in her legs on and off, feels a sharp pain come on at a random time, then it goes away. Patient states this morning had a hard time standing on her feet, felt her legs very cold and burning sensation. She also reports breast pain, she will follow up with PCP. She states continues to have cough, but only at night. She denies any fevers, chills, nausea, vomiting, shortness of breath, chest pain or any others.  Chief Complaint: COVID-19  Community Resources: Other (none)    Progress/Care Plan:  Is the patient progressing as planned?: Yes  Care Plan Update: Patient states she is feeling okay. Patient reports feeling tired. Patient feels tired. She states has sharp pain in her legs on and off, feels a sharp pain come on at a random time, then it goes away. Patient states this morning had a hard time standing on her feet, felt her legs very cold and burning sensation. She also reports breast pain, she will follow up with PCP. She states continues to have cough, but only at night. She denies any fevers, chills, nausea, vomiting, shortness of breath, chest pain or any others.  New Care Plan: Patient will follow up with PCP on compression socks and breast pain. Patient will seek immediate medical attention for any worsening symptoms.  Frequency/Follow Up Plan: Program graduation     Notes:  Navigator Notes: Patient will follow up with PCP. NCM will follow up with patient next week for update.       Nursing Interventions:  All discharge instructions reviewed with the patient. Reviewed when to call MD vs when to call 911 or go the ED. Educated patient on the importance of taking all meds as  prescribed as well as close f/u with PCP/specialists. Pt verbalized understanding and will contact the office with any further questions or concerns. Patient denies fevers, chills, nausea, vomiting, shortness of breath, chest pain, or any other symptoms at this time.   NCM provided contact information for any further questions/concerns. Patient verbalized understanding and agreeable.         Medications:Reviewed medication list.  Medications are up to date.  Current Outpatient Medications   Medication Sig Dispense Refill    SYNTHROID 25 MCG Oral Tab Take 1 tablet (25 mcg total) Monday- Thursday. Take 2 tablets (50 mcg total) Friday- Sunday. 90 day supply 120 tablet 0    acetaminophen 500 MG Oral Tab Take 1 tablet (500 mg total) by mouth every 6 (six) hours as needed for Pain.      SYNTHROID 25 MCG Oral Tab Take 1 tablet of 25 mcg on Monday-Thursday. Take 2 tablets of 25 mcg (total 50 mcg) on Friday-Sunday.      SYMBICORT 160-4.5 MCG/ACT Inhalation Aerosol Inhale 2 puffs into the lungs 2 (two) times daily.      Flaxseed, Linseed, (FLAXSEED OIL OR) Take by mouth.      albuterol 108 (90 Base) MCG/ACT Inhalation Aero Soln Inhale 2 puffs into the lungs every 6 (six) hours as needed for Wheezing. 1 each 1    Cholecalciferol (VITAMIN D) 50 MCG (2000 UT) Oral Cap Take by mouth.      Turmeric (QC TUMERIC COMPLEX OR) Take by mouth.      vitamin B-12 50 MCG Oral Tab Take 1 tablet (50 mcg total) by mouth daily.      Respiratory Therapy Supplies (NEBULIZER/TUBING/MOUTHPIECE) Does not apply Kit Use as directed. 1 each 0    COLLAGEN OR Take by mouth.      Ascorbic Acid (VITAMIN C) 100 MG Oral Tab Take 1 tablet (100 mg total) by mouth daily.      Calcium-Magnesium (GABRIELLA-MAG OR) Take 600 mg by mouth in the morning and 600 mg before bedtime.

## 2024-10-22 ENCOUNTER — PATIENT OUTREACH (OUTPATIENT)
Dept: CASE MANAGEMENT | Age: 79
End: 2024-10-22

## 2024-11-06 NOTE — PROGRESS NOTES
Brief OV, fibromyalgia neg. Multiple labs including Lyme neg. CBC and UA done today=neg. Underlying depression from loss of son in suicide 10yrs ago, and  and sis 6 mos ago-out of her [employer] rental now living with son.  Can no longer afford [facility] an
No

## 2024-11-15 ENCOUNTER — OFFICE VISIT (OUTPATIENT)
Facility: CLINIC | Age: 79
End: 2024-11-15
Payer: MEDICARE

## 2024-11-15 ENCOUNTER — LAB ENCOUNTER (OUTPATIENT)
Dept: LAB | Age: 79
End: 2024-11-15
Attending: STUDENT IN AN ORGANIZED HEALTH CARE EDUCATION/TRAINING PROGRAM
Payer: MEDICARE

## 2024-11-15 VITALS
HEIGHT: 59 IN | HEART RATE: 68 BPM | BODY MASS INDEX: 21.17 KG/M2 | SYSTOLIC BLOOD PRESSURE: 112 MMHG | OXYGEN SATURATION: 98 % | DIASTOLIC BLOOD PRESSURE: 62 MMHG | WEIGHT: 105 LBS

## 2024-11-15 DIAGNOSIS — E03.9 HYPOTHYROIDISM, UNSPECIFIED TYPE: ICD-10-CM

## 2024-11-15 DIAGNOSIS — E03.9 HYPOTHYROIDISM, UNSPECIFIED TYPE: Primary | ICD-10-CM

## 2024-11-15 DIAGNOSIS — E06.3 HASHIMOTO'S DISEASE: ICD-10-CM

## 2024-11-15 LAB
T4 FREE SERPL-MCNC: 1.1 NG/DL (ref 0.8–1.7)
TSI SER-ACNC: 4.51 UIU/ML (ref 0.55–4.78)

## 2024-11-15 PROCEDURE — 84443 ASSAY THYROID STIM HORMONE: CPT

## 2024-11-15 PROCEDURE — 1160F RVW MEDS BY RX/DR IN RCRD: CPT | Performed by: STUDENT IN AN ORGANIZED HEALTH CARE EDUCATION/TRAINING PROGRAM

## 2024-11-15 PROCEDURE — 3078F DIAST BP <80 MM HG: CPT | Performed by: STUDENT IN AN ORGANIZED HEALTH CARE EDUCATION/TRAINING PROGRAM

## 2024-11-15 PROCEDURE — 36415 COLL VENOUS BLD VENIPUNCTURE: CPT

## 2024-11-15 PROCEDURE — 84439 ASSAY OF FREE THYROXINE: CPT

## 2024-11-15 PROCEDURE — 1159F MED LIST DOCD IN RCRD: CPT | Performed by: STUDENT IN AN ORGANIZED HEALTH CARE EDUCATION/TRAINING PROGRAM

## 2024-11-15 PROCEDURE — 99213 OFFICE O/P EST LOW 20 MIN: CPT | Performed by: STUDENT IN AN ORGANIZED HEALTH CARE EDUCATION/TRAINING PROGRAM

## 2024-11-15 PROCEDURE — 3074F SYST BP LT 130 MM HG: CPT | Performed by: STUDENT IN AN ORGANIZED HEALTH CARE EDUCATION/TRAINING PROGRAM

## 2024-11-15 PROCEDURE — 3008F BODY MASS INDEX DOCD: CPT | Performed by: STUDENT IN AN ORGANIZED HEALTH CARE EDUCATION/TRAINING PROGRAM

## 2024-11-15 NOTE — PATIENT INSTRUCTIONS
Visit Summary  Please complete your labs today and I will be in touch with next steps     General follow up information:  Please let us know if you require any refills at least 1 week prior to your medication running out. If you do run out of medication, please call our office ASAP to request refills (do not wait until your follow up).  Please call us if you experience any problems with insurance coverage of medication, lab work, or imaging.   The on-call pager is for urgent matters only. If you are a type 1 diabetic and run out of insulin after business hours 8AM-4PM, you may call the on-call pager for a refill to a 24 hour pharmacy. If you have adrenal insufficiency and run out of steroids, you may call the on-call pager for a refill to a 24 hours pharmacy. All other refill requests should be requested during business hours.    Return Visit      [] Virtual visit  [] No follow up appointment needed  [x] Follow up to be scheduled pending      []  Fasting/8AM labs  []  Central scheduling # for ultrasound/nuclear med/CT/MRI/DXA/IR  []  Provide flyer for:  [] ENT   [] Weight Management  [] Infertility/Reproductive Endocrinology  [] Transgender care  [x]  Directions to 1st floor lab  [] Collect urine collection jug  [] Collect salivary cortisol tubes  []  Dental clearance form  []  Will need authorization for outside records

## 2024-11-15 NOTE — PROGRESS NOTES
Endocrinology Clinic Note    Name: Arianna Colin    Date: 11/15/2024       HISTORY OF PRESENT ILLNESS   Arianna Colin is a 79 year old female with PMHx significant for astham, fibromyalgia, Hashimoto's, osteoporosis who presents for endocrine consultation for thyroid management.    Initial HPI consult in Oct 2023  Pt was seen by the endo service in July 2023 when she was hospitalized for syncopal/seizure activity. Endocrine consulted for subclinical hypoTH, due to pt having stopped LT4 25mcg several months ago. TFTs show mild subclinical hypoTH, which may be partially contributing to her fatigue and constipation. Discussed with pt that only generic LT4 is available in the hospital and she can trial Synthroid in the outpatient setting. Pt was amenable to restarting LT4 25mcg.     7/2023 - fT4 0.9, TSH 11.9 -- started LT4 25mcg while in the hospital; pt did not  Rx after DC, instead has resumed a thyroid preparation from her holistic provider  9/2023 - fT4 0.8, TSH 4.3     Had some constipation earlier that has resolved. Is trying to arrange for endoscopy.    Interim hx:  Feb 2024  3/2024 - fT4 1.0, TSH 4.6, TT3 89 -- been on Synthroid 25mcg since last visit   Recently just started a supplement that 'supports thyroid' that contains tumeric, feels better  Has myelogram on Thurs and pt has temporarily stopped her meds x 1 week    May 2024   Stopped taking lt4 since she didn't have any refills, last took it 5/4  Currently on synthroid 25 mcg   Does note shortness of breath from asthma so unsure if her symptoms are from thyroid or something else   Does endorse fatigue. Denies constipation since she is intermittnetly on \"super cleanse\" supplement. Does endorse weight gain of 3 pounds over 1 month. Does endorse chills/cold intolerance.     Nov 2024  She continues to note worsening asthma and some muscle aches   She should currently be on 1 tablet of LT4 25 mcg Monday through Friday and 2 tablets of 25 mcg on  Saturday and . However, she ran out of medication and stopped taking LT4 around August   10/2024 TSH 2.375, FT4 1.3  She is taking an herbal tea ; mullein 120,000 mg     PAST MEDICAL HISTORY:   Reviewed    PAST SURGICAL HISTORY:   Past Surgical History:   Procedure Laterality Date          Cholecystectomy      Colonoscopy      Egd      Hysterectomy      Total abdom hysterectomy         CURRENT MEDICATIONS:    Current Outpatient Medications   Medication Sig Dispense Refill    SYNTHROID 25 MCG Oral Tab Take 1 tablet (25 mcg total) Monday- Thursday. Take 2 tablets (50 mcg total) Friday- . 90 day supply 120 tablet 0    acetaminophen 500 MG Oral Tab Take 1 tablet (500 mg total) by mouth every 6 (six) hours as needed for Pain.      SYNTHROID 25 MCG Oral Tab Take 1 tablet of 25 mcg on Monday-Thursday. Take 2 tablets of 25 mcg (total 50 mcg) on Friday-.      SYMBICORT 160-4.5 MCG/ACT Inhalation Aerosol Inhale 2 puffs into the lungs 2 (two) times daily.      Flaxseed, Linseed, (FLAXSEED OIL OR) Take by mouth.      albuterol 108 (90 Base) MCG/ACT Inhalation Aero Soln Inhale 2 puffs into the lungs every 6 (six) hours as needed for Wheezing. 1 each 1    Cholecalciferol (VITAMIN D) 50 MCG (2000 UT) Oral Cap Take by mouth.      Turmeric (QC TUMERIC COMPLEX OR) Take by mouth.      vitamin B-12 50 MCG Oral Tab Take 1 tablet (50 mcg total) by mouth daily.      Respiratory Therapy Supplies (NEBULIZER/TUBING/MOUTHPIECE) Does not apply Kit Use as directed. 1 each 0    COLLAGEN OR Take by mouth.      Ascorbic Acid (VITAMIN C) 100 MG Oral Tab Take 1 tablet (100 mg total) by mouth daily.      Calcium-Magnesium (GABRIELLA-MAG OR) Take 600 mg by mouth in the morning and 600 mg before bedtime.       Endocrine Medications            SYNTHROID 25 MCG Oral Tab    SYNTHROID 25 MCG Oral Tab            ALLERGIES:  Allergies   Allergen Reactions    Carbamazepine OTHER (SEE COMMENTS) and SWELLING     Facial  swelling      Clonazepam OTHER (SEE COMMENTS)     Hallucinations        Other SHORTNESS OF BREATH, OTHER (SEE COMMENTS), RASH, FATIGUE and PAIN     Cerner Allergy Text Annotation: Contrast Dye    Pqq sepra    Soy Allergy HIVES    Isoflavones NAUSEA AND VOMITING    Codeine OTHER (SEE COMMENTS)     Cerner Allergy Text Annotation: codeine    Diatrizoate OTHER (SEE COMMENTS)    Gluten Meal OTHER (SEE COMMENTS)     Bloating/fullness    Iodine (Topical) OTHER (SEE COMMENTS)     Not sure if reacted to topical    Meperidine OTHER (SEE COMMENTS)     Cerner Allergy Text Annotation: Demerol    Ciprofloxacin Hcl RASH    Erythromycin RASH    Soybean Allergy NAUSEA ONLY    Sulfa Antibiotics RASH    Tetracaine RASH    Tetracycline RASH    Vicodin [Hydrocodone-Acetaminophen] NAUSEA ONLY and OTHER (SEE COMMENTS)     Feels \"drunk\"    Xylocaine [Lidocaine] RASH       SOCIAL HISTORY:    Social History     Socioeconomic History    Marital status:    Tobacco Use    Smoking status: Former     Current packs/day: 0.00     Average packs/day: 0.5 packs/day for 1 year (0.5 ttl pk-yrs)     Types: Cigarettes     Start date:      Quit date:      Years since quittin.9     Passive exposure: Never    Smokeless tobacco: Never    Tobacco comments:     Smoke in college for a year to 2   Vaping Use    Vaping status: Never Used   Substance and Sexual Activity    Alcohol use: Not Currently    Drug use: No   Other Topics Concern    Caffeine Concern Yes     Comment: 1 cup daily       FAMILY HISTORY:   Family History   Problem Relation Age of Onset    No Known Problems Daughter     No Known Problems Son     Psychiatric Son     Colon Polyps Sister     Other (Other) Sister         Fibromyalgia    Hypertension Brother     Asthma Granddaughter          REVIEW OF SYSTEMS:  Ten point review of systems has been performed and is otherwise negative and/or non-contributory, except as described above.      PHYSICAL EXAM:   Vitals:    11/15/24 1021    BP: 112/62   Pulse: 68   SpO2: 98%   Weight: 105 lb (47.6 kg)   Height: 4' 11\" (1.499 m)     BMI: Body mass index is 21.21 kg/m².     CONSTITUTIONAL:  awake, alert, cooperative, no apparent distress, and appears stated age  PSYCH: normal affect  LUNGS: breathing comfortably  CARDIOVASCULAR:  regular rate       DATA:     Pertinent data reviewed      ASSESSMENT AND PLAN:    (E06.3) Hashimoto's disease  (primary encounter diagnosis)   Biochemically subclinical hypoTH but has improved. Pt finally started Synthroid after initial outpatient visit and has felt better on thyroxine. 2/2024 started a 'thyroid supplement' that she feels is also helping with her energy. Pt also started mullein supplement/tea earlier this year and stopped taking LT4 since around 8/2024  10/2024 TSH WNL  - Staff reached out to patient's pharmacy to see if she picked up last prescription since patient is unsure when she stopped her LT4  -TFTs now since patient thinks her last dose of LT4  was a few months ago   - TFTs prior to next visit     The above plan was discussed in detail with the patient who verbalized understanding and agreement.      Kathi Sow DO  [facility]  11/15/2024     In reviewing this note, please be advised that Dragon Voice Recognition software used to dictate the note may have made errors in recognizing some of the words or phrases.     Note to patient: The 21 Century Cures Act makes medical notes like these available to patients in the interest of transparency. However, be advised this is a medical document. It is intended as peer to peer communication. It is written in medical language and may contain abbreviations or verbiage that are unfamiliar. It may appear blunt or direct. Medical documents are intended to carry relevant information, facts as evident, and the clinical opinion of the practitioner.

## 2024-11-19 ENCOUNTER — PATIENT OUTREACH (OUTPATIENT)
Dept: CASE MANAGEMENT | Age: 79
End: 2024-11-19

## 2024-11-19 DIAGNOSIS — R45.89 DEPRESSED MOOD: ICD-10-CM

## 2024-11-19 DIAGNOSIS — E06.3 HASHIMOTO'S DISEASE: ICD-10-CM

## 2024-11-19 DIAGNOSIS — F41.9 ANXIETY: ICD-10-CM

## 2024-11-19 DIAGNOSIS — M16.0 PRIMARY OSTEOARTHRITIS OF BOTH HIPS: ICD-10-CM

## 2024-11-19 DIAGNOSIS — J44.89 ASTHMA WITH COPD (CHRONIC OBSTRUCTIVE PULMONARY DISEASE) (HCC): ICD-10-CM

## 2024-11-19 DIAGNOSIS — E03.9 ACQUIRED HYPOTHYROIDISM: Primary | ICD-10-CM

## 2024-11-19 NOTE — PROGRESS NOTES
Spoke to Arianna for [facility] -  monthly call     Updates to patient care team/ comments: Up-to-date /Reviewed with patient     Patient reported changes in medications: None  Med Adherence  Comment: Taking as directed    Health Maintenance:  Reviewed with patient.  Health Maintenance   Topic Date Due    Pneumococcal Vaccine: 65+ Years (1 of 2 - PCV) Never done    Zoster Vaccines (1 of 2) Never done    Colorectal Cancer Screening  01/01/2023    COVID-19 Vaccine (1 - 2024-25 season) Never done    Influenza Vaccine (1) Never done    DEXA Scan  Completed    MA Annual Health Assessment  Completed    Annual Depression Screening  Completed    Fall Risk Screening (Annual)  Completed     Patient current concerns:   [facility] spoke with the patient this afternoon. She reports doing as well as can be.Her asthma continues to flare up, and she reports experiencing shortness of breath and cold weather makes it worse. She complains of fatigue due to her asthma. She is using her inhalers as instructed.     She has not yet received her compression stockings but states she is working on obtaining them. She complains of swelling around the ankles and experiencing sharp pain intermittently.     She reports that her diet is good and she is getting plenty of sleep. She has no plans currently on getting a COVID, flu., shingles or pneumonia vaccine.     She saw endocrinologist, Dr. Sow for a consultation for thyroid management, on 11/15/24. She has not been taking her thyroid medication but has been substituting it with Mullein herbal tea. Dr Sow ordered blood work and will determine the appropriate treatment for her thyroid condition based on results.   Component      Latest Ref Rng 11/15/2024   T4,Free (Direct)      0.8 - 1.7 ng/dL 1.1    TSH      0.550 - 4.780 uIU/mL 4.506    \"Thank you for getting your blood work done.  The results are within acceptable range and available for your review.  We can hold off on restarting your thyroid  medication at this time.  Please repeat your labs in 3 months and again in 6 months. Please let me know if you have any questions\".  Written by Kathi Sow DO on 11/15/2024  1:29 PM CST     Went over lab results and treatment plan with the patient and she is agreeable.       Goals/Action Plan:    Active goal from previous outreach: The patient wants to stay healthy and active.     Patient reported progress towards goals: The patient continues to work towards goals.  What: She reports that her diet is good and she is getting plenty of sleep. She has no plans currently on getting a COVID, flu., shingles or pneumonia vaccine.   Where/When/How: She saw endocrinologist, Dr. Sow for a consultation for thyroid management, on 11/15/24. She has not been taking her thyroid medication but has been substituting it with Mullein herbal tea. Dr Sow ordered blood work and will determine the appropriate treatment for her thyroid condition based on results.     Patient Reported Barriers and Concerns:  She reports doing as well as can be.Her asthma continues to flare up, and she reports experiencing shortness of breath and cold weather makes it worse. She complains of fatigue due to her asthma. She is using her inhalers as instructed.   Plan for overcoming barriers: She has not yet received her compression stockings but states she is working on obtaining them. She complains of swelling around the ankles and experiencing sharp pain intermittently.       Care managers interventions:    These interventions aim to enhance patient knowledge, improve their ability to manage their health, and ultimately lead to better health outcomes.     Appointments reviewed with patient.   Future Appointments   Date Time Provider Department Center   2/5/2025  2:00 PM Mackenzie Maher MD [facility] Nadeen Moreno        Next Care Manager Follow Up Date: 1 Month     Reason For Follow Up: review progress and or barriers towards patient's goals.      Time Spent This Encounter Total: 23 minutes medical record review, telephone communication, care plan updates where needed, education, goals, and action plan recreation/update. Provided acknowledgment and validation to patient's concerns.   Monthly Minute Total including today: 23 minutes  Physical assessment, complete health history, and need for CCM established by Micheal Clifford MD.

## 2024-11-27 ENCOUNTER — OFFICE VISIT (OUTPATIENT)
Facility: LOCATION | Age: 79
End: 2024-11-27
Payer: MEDICARE

## 2024-11-27 DIAGNOSIS — H93.293 ABNORMAL AUDITORY PERCEPTION OF BOTH EARS: Primary | ICD-10-CM

## 2024-11-27 PROCEDURE — 92567 TYMPANOMETRY: CPT | Performed by: AUDIOLOGIST

## 2024-11-27 PROCEDURE — 92557 COMPREHENSIVE HEARING TEST: CPT | Performed by: AUDIOLOGIST

## 2024-11-27 PROCEDURE — 1159F MED LIST DOCD IN RCRD: CPT | Performed by: AUDIOLOGIST

## 2024-11-27 NOTE — PROGRESS NOTES
Arianna Colin was seen for audiometric evaluation today.  Referred back to physician.     Susi Little

## 2024-12-09 ENCOUNTER — TELEPHONE (OUTPATIENT)
Dept: INTERNAL MEDICINE CLINIC | Facility: CLINIC | Age: 79
End: 2024-12-09

## 2024-12-19 ENCOUNTER — PATIENT OUTREACH (OUTPATIENT)
Dept: CASE MANAGEMENT | Age: 79
End: 2024-12-19

## 2024-12-19 DIAGNOSIS — E03.9 ACQUIRED HYPOTHYROIDISM: Primary | ICD-10-CM

## 2024-12-19 DIAGNOSIS — E06.3 HASHIMOTO'S DISEASE: ICD-10-CM

## 2024-12-19 DIAGNOSIS — M25.50 POLYARTHRALGIA: ICD-10-CM

## 2024-12-19 DIAGNOSIS — F41.9 ANXIETY: ICD-10-CM

## 2024-12-19 DIAGNOSIS — J44.89 ASTHMA WITH COPD (CHRONIC OBSTRUCTIVE PULMONARY DISEASE) (HCC): ICD-10-CM

## 2024-12-19 NOTE — PROGRESS NOTES
Spoke to Arianna for [facility] -  monthly call     Updates to patient care team/ comments: Up-to-date,    Patient reported changes in medications: None.   Med Adherence  Comment: Taking as directed    Health Maintenance:  Reviewed with patient.  Health Maintenance   Topic Date Due    Pneumococcal Vaccine: 65+ Years (1 of 2 - PCV) Never done -declined    Zoster Vaccines (1 of 2) Never done -declined    Colorectal Cancer Screening  01/01/2023    COVID-19 Vaccine (1 - 2024-25 season) Never done- declined    Influenza Vaccine (1) Never done -declined    DEXA Scan  Completed    MA Annual Health Assessment  Completed    Annual Depression Screening  Completed    Fall Risk Screening (Annual)  Completed     Patient current concerns:   [facility] spoke with the patient today, who reported feeling fatigued as a result her asthma flare-up. The patient also reports shortness of breath and states that she is using her inhalers and nebulizer machine as needed. She has an appointment with pulmonology scheduled for 3/3/25. I advised the patient to call and speak with a nurse if her symptoms get worse. The patient is agreeable with plan.     She has obtained compression stockings and has been using them. However, she is unsure if they are helping with swelling, as she has not been moving around much due to her fatigue.     The patient had her hearing tested on 11/27/24. She has an appointment to follow-up the results on 1/10/25.     She will call and schedule an appointment with LIN Robbins , once she can secure transportation. The patient will notify [facility] if she needs help with this.     She has no other questions or concerns at this time.     Goals/Action Plan:    Active goal from previous outreach: The patient wants to stay healthy and active.     Patient reported progress towards goals: The patient continues to work towards goals.  What: The patient had her hearing tested on 11/27/24. She has an appointment to follow-up the results  on 1/10/25.   Where/When/How: She has obtained compression stockings and has been using them. However, she is unsure if they are helping with swelling, as she has not been moving around much due to her fatigue.     Patient Reported Barriers and Concerns: [facility] spoke with the patient today, who reported feeling fatigued as a result her asthma flare-up. The patient also reports shortness of breath and states that she is using her inhalers and nebulizer machine as needed.   Plan for overcoming barriers: She has an appointment with pulmonology scheduled for 3/3/25. I advised the patient to call and speak with a nurse if her symptoms get worse. The patient is agreeable with plan.         Care managers interventions:    These interventions aim to enhance patient knowledge, improve their ability to manage their health, and ultimately lead to better health outcomes.     Self care: Encouraged the patient to take time to do the things the love and enjoy.    Preventive Health Education: The patient was encouraged to adhere to health screenings, vaccinations , tests, and scheduled appointments.   Physical Activity: Encouraged the patient to stay active to improve physical and mental well-being.  Medication Adherence: Reviewed the patients current medication regimen to ensure proper adherence.     Appointments reviewed with patient.   Future Appointments   Date Time Provider Department Center   1/10/2025 10:40 AM Peter Roland MD [id number]   2/11/2025  1:00 PM EH US RM 2 EH US [facility]   2/11/2025  2:00 PM EH XR RM5 GENERAL EH XRAY [facility]   2/11/2025  4:00 PM EH US RM 1 EH US [facility]   3/13/2025  9:30 AM Mackenzie Maher MD [facility] Pulm EDITA [place] Care Manager Follow Up Date: 1 Month     Reason For Follow Up: review progress and or barriers towards patient's goals.     Time Spent This Encounter Total:22  minutes medical record review, telephone communication, care plan updates where needed,  education, goals, and action plan recreation/update. Provided acknowledgment and validation to patient's concerns.   Monthly Minute Total including today: 22 minutes  Physical assessment, complete health history, and need for CCM established by Micheal Clifford MD.

## 2025-01-10 ENCOUNTER — OFFICE VISIT (OUTPATIENT)
Facility: LOCATION | Age: 80
End: 2025-01-10
Payer: MEDICARE

## 2025-01-10 DIAGNOSIS — H93.13 TINNITUS OF BOTH EARS: Primary | ICD-10-CM

## 2025-01-10 PROCEDURE — 1160F RVW MEDS BY RX/DR IN RCRD: CPT | Performed by: OTOLARYNGOLOGY

## 2025-01-10 PROCEDURE — 99203 OFFICE O/P NEW LOW 30 MIN: CPT | Performed by: OTOLARYNGOLOGY

## 2025-01-10 PROCEDURE — 1159F MED LIST DOCD IN RCRD: CPT | Performed by: OTOLARYNGOLOGY
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
## 2025-01-30 ENCOUNTER — PATIENT OUTREACH (OUTPATIENT)
Dept: CASE MANAGEMENT | Age: 80
End: 2025-01-30

## 2025-01-30 NOTE — PROGRESS NOTES
The patient called CCM to inform us that she will be transferring her primary care to [facility]   Explained to her we would no longer be able to provided CCM services for her   Pt understands

## 2025-02-03 ENCOUNTER — TELEPHONE (OUTPATIENT)
Facility: CLINIC | Age: 80
End: 2025-02-03

## 2025-02-11 ENCOUNTER — APPOINTMENT (OUTPATIENT)
Dept: GENERAL RADIOLOGY | Facility: HOSPITAL | Age: 80
End: 2025-02-11
Attending: EMERGENCY MEDICINE
Payer: MEDICARE

## 2025-02-11 ENCOUNTER — HOSPITAL ENCOUNTER (OUTPATIENT)
Dept: ULTRASOUND IMAGING | Facility: HOSPITAL | Age: 80
Discharge: HOME | End: 2025-02-11
Payer: MEDICARE

## 2025-02-11 ENCOUNTER — HOSPITAL ENCOUNTER (OUTPATIENT)
Dept: GENERAL RADIOLOGY | Facility: HOSPITAL | Age: 80
Discharge: HOME | End: 2025-02-11
Payer: MEDICARE

## 2025-02-11 ENCOUNTER — HOSPITAL ENCOUNTER (EMERGENCY)
Facility: HOSPITAL | Age: 80
Discharge: HOME | End: 2025-02-11
Attending: EMERGENCY MEDICINE
Payer: MEDICARE

## 2025-02-11 VITALS
HEIGHT: 59 IN | RESPIRATION RATE: 16 BRPM | DIASTOLIC BLOOD PRESSURE: 56 MMHG | SYSTOLIC BLOOD PRESSURE: 114 MMHG | WEIGHT: 100 LBS | BODY MASS INDEX: 20.16 KG/M2 | TEMPERATURE: 98 F | OXYGEN SATURATION: 100 % | HEART RATE: 68 BPM

## 2025-02-11 DIAGNOSIS — I87.2 VENOUS INSUFFICIENCY: ICD-10-CM

## 2025-02-11 DIAGNOSIS — M25.511 RIGHT SHOULDER PAIN: ICD-10-CM

## 2025-02-11 DIAGNOSIS — R06.09 DYSPNEA ON EXERTION: Primary | ICD-10-CM

## 2025-02-11 LAB
ALBUMIN SERPL-MCNC: 4.6 G/DL (ref 3.2–4.8)
ALBUMIN/GLOB SERPL: 2 {RATIO} (ref 1–2)
ALP LIVER SERPL-CCNC: 76 U/L
ALT SERPL-CCNC: 13 U/L
ANION GAP SERPL CALC-SCNC: 4 MMOL/L (ref 0–18)
APTT PPP: 25.7 SECONDS (ref 23–36)
AST SERPL-CCNC: 18 U/L (ref ?–34)
BASE EXCESS BLDV CALC-SCNC: 2.8 MMOL/L
BASOPHILS # BLD AUTO: 0.07 X10(3) UL (ref 0–0.2)
BASOPHILS NFR BLD AUTO: 1.1 %
BILIRUB SERPL-MCNC: 0.7 MG/DL (ref 0.2–1.1)
BUN BLD-MCNC: 16 MG/DL (ref 9–23)
CALCIUM BLD-MCNC: 9.9 MG/DL (ref 8.7–10.6)
CHLORIDE SERPL-SCNC: 106 MMOL/L (ref 98–112)
CO2 SERPL-SCNC: 28 MMOL/L (ref 21–32)
CREAT BLD-MCNC: 0.65 MG/DL
D DIMER PPP FEU-MCNC: <0.27 UG/ML FEU (ref ?–0.82)
EGFRCR SERPLBLD CKD-EPI 2021: 88 ML/MIN/1.73M2 (ref 60–?)
EOSINOPHIL # BLD AUTO: 0.05 X10(3) UL (ref 0–0.7)
EOSINOPHIL NFR BLD AUTO: 0.8 %
ERYTHROCYTE [DISTWIDTH] IN BLOOD BY AUTOMATED COUNT: 13.2 %
FLUAV + FLUBV RNA SPEC NAA+PROBE: NEGATIVE
FLUAV + FLUBV RNA SPEC NAA+PROBE: NEGATIVE
GLOBULIN PLAS-MCNC: 2.3 G/DL (ref 2–3.5)
GLUCOSE BLD-MCNC: 85 MG/DL (ref 70–99)
HCO3 BLDV-SCNC: 26.3 MEQ/L (ref 22–26)
HCT VFR BLD AUTO: 37.8 %
HGB BLD-MCNC: 12.7 G/DL
IMM GRANULOCYTES # BLD AUTO: 0.02 X10(3) UL (ref 0–1)
IMM GRANULOCYTES NFR BLD: 0.3 %
INR BLD: 0.98 (ref 0.8–1.2)
LYMPHOCYTES # BLD AUTO: 2.32 X10(3) UL (ref 1–4)
LYMPHOCYTES NFR BLD AUTO: 37.7 %
MCH RBC QN AUTO: 30.8 PG (ref 26–34)
MCHC RBC AUTO-ENTMCNC: 33.6 G/DL (ref 31–37)
MCV RBC AUTO: 91.5 FL
MONOCYTES # BLD AUTO: 0.3 X10(3) UL (ref 0.1–1)
MONOCYTES NFR BLD AUTO: 4.9 %
NEUTROPHILS # BLD AUTO: 3.39 X10 (3) UL (ref 1.5–7.7)
NEUTROPHILS # BLD AUTO: 3.39 X10(3) UL (ref 1.5–7.7)
NEUTROPHILS NFR BLD AUTO: 55.2 %
NT-PROBNP SERPL-MCNC: 98 PG/ML (ref ?–450)
OSMOLALITY SERPL CALC.SUM OF ELEC: 286 MOSM/KG (ref 275–295)
OXYHGB MFR BLDV: 67.4 % (ref 72–78)
PCO2 BLDV: 47 MM HG (ref 38–50)
PH BLDV: 7.39 [PH] (ref 7.33–7.43)
PLATELET # BLD AUTO: 208 10(3)UL (ref 150–450)
PO2 BLDV: 44 MM HG (ref 30–50)
POTASSIUM SERPL-SCNC: 3.9 MMOL/L (ref 3.5–5.1)
PROT SERPL-MCNC: 6.9 G/DL (ref 5.7–8.2)
PROTHROMBIN TIME: 13.1 SECONDS (ref 11.6–14.8)
RBC # BLD AUTO: 4.13 X10(6)UL
RSV RNA SPEC NAA+PROBE: NEGATIVE
SARS-COV-2 RNA RESP QL NAA+PROBE: NOT DETECTED
SODIUM SERPL-SCNC: 138 MMOL/L (ref 136–145)
TROPONIN I SERPL HS-MCNC: <3 NG/L
WBC # BLD AUTO: 6.2 X10(3) UL (ref 4–11)

## 2025-02-11 PROCEDURE — 85610 PROTHROMBIN TIME: CPT | Performed by: EMERGENCY MEDICINE

## 2025-02-11 PROCEDURE — 99285 EMERGENCY DEPT VISIT HI MDM: CPT

## 2025-02-11 PROCEDURE — 85025 COMPLETE CBC W/AUTO DIFF WBC: CPT | Performed by: EMERGENCY MEDICINE

## 2025-02-11 PROCEDURE — 0241U SARS-COV-2/FLU A AND B/RSV BY PCR (GENEXPERT): CPT | Performed by: EMERGENCY MEDICINE

## 2025-02-11 PROCEDURE — 36415 COLL VENOUS BLD VENIPUNCTURE: CPT

## 2025-02-11 PROCEDURE — 85730 THROMBOPLASTIN TIME PARTIAL: CPT | Performed by: EMERGENCY MEDICINE

## 2025-02-11 PROCEDURE — 80053 COMPREHEN METABOLIC PANEL: CPT | Performed by: EMERGENCY MEDICINE

## 2025-02-11 PROCEDURE — 71045 X-RAY EXAM CHEST 1 VIEW: CPT | Performed by: EMERGENCY MEDICINE

## 2025-02-11 PROCEDURE — 99284 EMERGENCY DEPT VISIT MOD MDM: CPT

## 2025-02-11 PROCEDURE — 93005 ELECTROCARDIOGRAM TRACING: CPT

## 2025-02-11 PROCEDURE — 82803 BLOOD GASES ANY COMBINATION: CPT | Performed by: EMERGENCY MEDICINE

## 2025-02-11 PROCEDURE — 84484 ASSAY OF TROPONIN QUANT: CPT | Performed by: EMERGENCY MEDICINE

## 2025-02-11 PROCEDURE — 73030 X-RAY EXAM OF SHOULDER: CPT

## 2025-02-11 PROCEDURE — 93010 ELECTROCARDIOGRAM REPORT: CPT

## 2025-02-11 PROCEDURE — 85379 FIBRIN DEGRADATION QUANT: CPT | Performed by: EMERGENCY MEDICINE

## 2025-02-11 PROCEDURE — 83880 ASSAY OF NATRIURETIC PEPTIDE: CPT | Performed by: EMERGENCY MEDICINE
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
## 2025-02-11 NOTE — ED INITIAL ASSESSMENT (HPI)
Pt states feeling SOB for months.  Pt states having daily asthma attacks.  Pt states increased fatigue.  Pt states chest pain and HA

## 2025-02-12 NOTE — DISCHARGE INSTRUCTIONS
Continue regular medications  Return if worse  Follow with Dr. Maher  Follow-up with your primary care physician

## 2025-02-13 ENCOUNTER — EXTERNAL LAB (OUTPATIENT)
Dept: HEALTH INFORMATION MANAGEMENT | Facility: OTHER | Age: 80
End: 2025-02-13

## 2025-02-13 LAB
ATRIAL RATE: 74 BPM
HBA1C MFR BLD: 5.8 % OF TOTAL HGB
P AXIS: 74 DEGREES
P-R INTERVAL: 154 MS
Q-T INTERVAL: 410 MS
QRS DURATION: 76 MS
QTC CALCULATION (BEZET): 455 MS
R AXIS: 62 DEGREES
T AXIS: 73 DEGREES
T4 FREE SERPL-MCNC: 1.1 NG/DL (ref 0.8–1.8)
TSH SERPL-ACNC: 6.28 MIU/L (ref 0.4–4.5)
VENTRICULAR RATE: 74 BPM

## 2025-02-14 ENCOUNTER — OFFICE VISIT (OUTPATIENT)
Facility: CLINIC | Age: 80
End: 2025-02-14

## 2025-02-14 VITALS — WEIGHT: 100 LBS | HEIGHT: 59 IN | BODY MASS INDEX: 20.16 KG/M2

## 2025-02-14 DIAGNOSIS — R20.9 SENSATION OF COLD IN LEG: Primary | ICD-10-CM

## 2025-02-14 DIAGNOSIS — E03.9 HYPOTHYROIDISM, UNSPECIFIED TYPE: ICD-10-CM
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
## 2025-02-17 ENCOUNTER — HOSPITAL ENCOUNTER (OUTPATIENT)
Dept: ULTRASOUND IMAGING | Facility: HOSPITAL | Age: 80
Discharge: HOME | End: 2025-02-17
Payer: MEDICARE

## 2025-02-17 PROCEDURE — 93925 LOWER EXTREMITY STUDY: CPT

## 2025-02-17 PROCEDURE — 93970 EXTREMITY STUDY: CPT

## 2025-02-27 ENCOUNTER — EXTERNAL LAB (OUTPATIENT)
Dept: HEALTH INFORMATION MANAGEMENT | Facility: OTHER | Age: 80
End: 2025-02-27

## 2025-02-27 LAB
BASOPHILS # BLD: 68 CELLS/UL (ref 0–200)
BASOPHILS NFR BLD: 1.1 %
EOSINOPHIL # BLD: 81 CELLS/UL (ref 15–500)
EOSINOPHIL NFR BLD: 1.3 %
ERYTHROCYTE [DISTWIDTH] IN BLOOD: 12.6 % (ref 11–15)
HCT VFR BLD CALC: 36.2 % (ref 35–45)
HGB BLD-MCNC: 12.3 G/DL (ref 11.7–15.5)
LYMPHOCYTES # BLD: 2585 CELLS/UL (ref 850–3900)
LYMPHOCYTES NFR BLD: 41.7 %
MCH RBC QN AUTO: 31.2 PG (ref 27–33)
MCHC RBC AUTO-ENTMCNC: 34 G/DL (ref 32–36)
MCV RBC AUTO: 91.9 FL (ref 80–100)
MONOCYTES # BLD: 378 CELLS/UL (ref 200–950)
MONOCYTES NFR BLD: 6.1 %
NEUTROPHILS # BLD: 3088 CELLS/UL (ref 1500–7800)
NEUTROPHILS NFR BLD: 49.8 %
PLATELET # BLD: 236 THOUSAND/UL (ref 140–400)
PMV BLD AUTO: 11.3 FL (ref 7.5–12.5)
RBC # BLD: 3.94 MILLION/UL (ref 3.8–5.1)
WBC # BLD: 6.2 THOUSAND/UL (ref 3.8–10.8)

## 2025-02-28 ENCOUNTER — TELEPHONE (OUTPATIENT)
Facility: CLINIC | Age: 80
End: 2025-02-28

## 2025-02-28 NOTE — TELEPHONE ENCOUNTER
Patient called to speak with Dr. Oakes's office. The patient stats she lost all of her referrals that she was given during her visit and is asking if she can get new ones.

## 2025-03-10 ENCOUNTER — HOSPITAL ENCOUNTER (OUTPATIENT)
Dept: MAMMOGRAPHY | Facility: HOSPITAL | Age: 80
Discharge: HOME | End: 2025-03-10
Attending: NURSE PRACTITIONER
Payer: MEDICARE

## 2025-03-10 ENCOUNTER — HOSPITAL ENCOUNTER (OUTPATIENT)
Dept: MAMMOGRAPHY | Facility: HOSPITAL | Age: 80
Discharge: HOME | End: 2025-03-10
Payer: MEDICARE

## 2025-03-10 DIAGNOSIS — N64.4 BREAST PAIN: ICD-10-CM

## 2025-03-10 DIAGNOSIS — R92.8 ABNORMAL MAMMOGRAM: ICD-10-CM

## 2025-03-10 PROCEDURE — 76642 ULTRASOUND BREAST LIMITED: CPT

## 2025-03-10 PROCEDURE — 77062 BREAST TOMOSYNTHESIS BI: CPT | Performed by: NURSE PRACTITIONER

## 2025-03-10 PROCEDURE — 77066 DX MAMMO INCL CAD BI: CPT | Performed by: NURSE PRACTITIONER

## 2025-03-13 ENCOUNTER — OFFICE VISIT (OUTPATIENT)
Facility: CLINIC | Age: 80
End: 2025-03-13
Payer: MEDICARE

## 2025-03-13 VITALS
BODY MASS INDEX: 23.09 KG/M2 | DIASTOLIC BLOOD PRESSURE: 60 MMHG | HEIGHT: 58 IN | WEIGHT: 110 LBS | OXYGEN SATURATION: 96 % | RESPIRATION RATE: 16 BRPM | SYSTOLIC BLOOD PRESSURE: 118 MMHG | HEART RATE: 70 BPM

## 2025-03-13 DIAGNOSIS — J45.901 MODERATE ASTHMA WITH ACUTE EXACERBATION, UNSPECIFIED WHETHER PERSISTENT (HCC): Primary | ICD-10-CM

## 2025-03-13 DIAGNOSIS — R06.09 DYSPNEA ON EXERTION: ICD-10-CM

## 2025-03-13 PROCEDURE — 1160F RVW MEDS BY RX/DR IN RCRD: CPT | Performed by: INTERNAL MEDICINE

## 2025-03-13 PROCEDURE — 3008F BODY MASS INDEX DOCD: CPT | Performed by: INTERNAL MEDICINE

## 2025-03-13 PROCEDURE — 1159F MED LIST DOCD IN RCRD: CPT | Performed by: INTERNAL MEDICINE

## 2025-03-13 PROCEDURE — 99214 OFFICE O/P EST MOD 30 MIN: CPT | Performed by: INTERNAL MEDICINE

## 2025-03-13 PROCEDURE — 3078F DIAST BP <80 MM HG: CPT | Performed by: INTERNAL MEDICINE

## 2025-03-13 PROCEDURE — 3074F SYST BP LT 130 MM HG: CPT | Performed by: INTERNAL MEDICINE

## 2025-03-13 NOTE — PATIENT INSTRUCTIONS
Plan- plan to resume SYmbicort 2 puffs EVERY MORNING --          -- OK to take Symbicort at night as needed     - continue rescue inhaler -- albuterol - 2 puffs every 4 hours as needed   - please make appointment with AMBER Newton - [facility] -as soon as possible -- ill call                   - see me  In 4 months   -    Mackenzie Maher MD  Pulmonary Medicine  3/13/2025
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
## 2025-03-17 ENCOUNTER — TELEPHONE (OUTPATIENT)
Facility: CLINIC | Age: 80
End: 2025-03-17

## 2025-03-18 NOTE — TELEPHONE ENCOUNTER
NOTES FOR VISIT:   Recommend the irritated seborrheic keratosis on the mid thoracic back be treated with cryo today.  The patient gave informed consent.  This was accomplished.  We discussed expected side effects from this.  She was given our standard cryo instructions.  We discussed the benign nature of the seborrheic keratosis on her forehead.  She defers treatment of this at this time.  The EIC treated on her last visit resolved    IMAGES:  No images are attached to the encounter.    FOLLOW-UP: Return if symptoms worsen or fail to improve, for Seborrheic keratosis.    Dayna was seen today for office visit.    Diagnoses and all orders for this visit:    Seborrheic keratosis, inflamed  -     DESTRUCTION BENIGN LESIONS (OTHER THAN SKIN TAGS) 1- 14 LESIONS         There are no discontinued medications.     Chief Complaint   Patient presents with    Office Visit     SK removal        HISTORY: Dayna is here today for:  Presents for evaluation treatment of a seborrheic keratosis on her forehead and on her mid thoracic back.  The 1 on her forehead is decreased in thickness and no longer itches and is of concern to the patient.  She still has 1 on the mid thoracic back.    PAST DERMATOLOGY-SPECIFIC HISTORY:  EIC  Seborrheic keratosis    ACTIVE DERMATOLOGY CONDITIONS AND PRESCRIPTIONS:  N/A    PAST MEDICAL HISTORY, PAST SURGICAL HISTORY, SOCIAL HISTORY, AND FAMILY HISTORY WERE REVIEWED.    PHYSICAL EXAMINATION: CONSTITUTIONAL:  Dayna  is a 38 year old female who is well developed, well nourished, in no acute distress.  Visit Vitals  LMP 04/24/2024 (Exact Date)   . NEUROLOGIC AND PSYCHIATRIC: She has a normal mood and affect. SKIN: Complete examination, including palpation and careful visual examination of the area of interest revealed no other significant abnormality or eruption. I noted:    Moves easily about the room appears approximately her stated age.  She does not have glasses on.  Examination of her  [facility] nurse care manager called, requesting albuterol refill for pt. Gerri called, did not answer. Called the main nurse manager's office, couldn't speak with anyone. Pt called. LVM stating I will send albuterol refill but to please call our office back to schedule a follow up appointment.     Pt last seen via [facility] by Bridgett CEBALLOS on 12/14/2023. No follow up appointments.     Per Meme CEBALLOS:  Recommend to continue george pot and nasal rinses  Recommend to continue Breo daily and albuterol as directed for  shortness of breath  Followup in 4 weeks; telehealth is ok    Per Dr. Maher's note on 8/7/2023  Plan- continue Breo as discussed -consider daily or every other day          - to begin rescue inhaler or nebulizer  -- albuterol - 2 puffs every 4 hours as needed    Pt returned call. Made aware her albuterol refill was sent to her pharmacy on file. Pt states will call back to schedule appt.    forehead reveals the seborrheic keratosis is very thin and hardly noticeable just inside the hairline.  Inspection of her back reveals an irritated seborrheic keratosis on the midline on the mid thoracic back.    Pete Del Rosario, VICTOR M, NP, DCNP

## 2025-03-24 ENCOUNTER — TELEPHONE (OUTPATIENT)
Facility: CLINIC | Age: 80
End: 2025-03-24

## 2025-03-24 NOTE — TELEPHONE ENCOUNTER
I called patient to wear thigh-high 20 to 30 compression stocking for symptoms control at least 6-8 hours.  She agreed

## 2025-03-24 NOTE — TELEPHONE ENCOUNTER
Patient calling states was ordered compression socks, asking for further assistance on instructions. Please call.

## 2025-04-08 ENCOUNTER — TELEPHONE (OUTPATIENT)
Dept: OPHTHALMOLOGY | Age: 80
End: 2025-04-08

## 2025-04-28 ENCOUNTER — APPOINTMENT (OUTPATIENT)
Dept: OPTOMETRY | Age: 80
End: 2025-04-28

## 2025-04-28 ENCOUNTER — CLINICAL ABSTRACT (OUTPATIENT)
Dept: HEALTH INFORMATION MANAGEMENT | Facility: OTHER | Age: 80
End: 2025-04-28

## 2025-04-28 DIAGNOSIS — H04.123 TEAR FILM INSUFFICIENCY, BILATERAL: ICD-10-CM

## 2025-04-28 DIAGNOSIS — Z96.1 PSEUDOPHAKIA OF BOTH EYES: ICD-10-CM

## 2025-04-28 DIAGNOSIS — H40.003 PREGLAUCOMA, BILATERAL: ICD-10-CM

## 2025-04-28 DIAGNOSIS — H53.2 DIPLOPIA: Primary | ICD-10-CM

## 2025-04-28 PROBLEM — F41.9 ANXIETY: Status: ACTIVE | Noted: 2023-07-18

## 2025-04-28 PROBLEM — M16.0 PRIMARY OSTEOARTHRITIS OF BOTH HIPS: Status: ACTIVE | Noted: 2024-03-04

## 2025-04-28 PROBLEM — F32.9 REACTIVE DEPRESSION: Status: ACTIVE | Noted: 2019-01-21

## 2025-04-28 PROBLEM — S09.90XA HEAD INJURY DUE TO TRAUMA: Status: ACTIVE | Noted: 2025-04-28

## 2025-04-28 PROBLEM — M19.042 OSTEOARTHRITIS OF BOTH HANDS: Status: ACTIVE | Noted: 2023-03-27

## 2025-04-28 PROBLEM — R07.89 ATYPICAL CHEST PAIN: Status: ACTIVE | Noted: 2022-03-10

## 2025-04-28 PROBLEM — G31.84 MCI (MILD COGNITIVE IMPAIRMENT): Status: ACTIVE | Noted: 2020-12-03

## 2025-04-28 PROBLEM — R13.19 ESOPHAGEAL DYSPHAGIA: Status: ACTIVE | Noted: 2021-10-07

## 2025-04-28 PROBLEM — M50.30 DDD (DEGENERATIVE DISC DISEASE), CERVICAL: Status: ACTIVE | Noted: 2024-03-04

## 2025-04-28 PROBLEM — Z86.19 HISTORY OF LYME DISEASE: Status: ACTIVE | Noted: 2025-04-28

## 2025-04-28 PROBLEM — R11.0 NAUSEA: Status: ACTIVE | Noted: 2021-10-07

## 2025-04-28 PROBLEM — Z77.120 MOLD EXPOSURE: Status: ACTIVE | Noted: 2025-04-28

## 2025-04-28 PROBLEM — R19.8 IRREGULAR BOWEL HABITS: Status: ACTIVE | Noted: 2021-10-07

## 2025-04-28 PROBLEM — J44.89 ASTHMA WITH COPD (CHRONIC OBSTRUCTIVE PULMONARY DISEASE)  (CMD): Chronic | Status: ACTIVE | Noted: 2024-03-05

## 2025-04-28 PROBLEM — M19.041 OSTEOARTHRITIS OF BOTH HANDS: Status: ACTIVE | Noted: 2023-03-27

## 2025-04-28 PROBLEM — M25.50 POLYARTHRALGIA: Status: ACTIVE | Noted: 2024-03-04

## 2025-04-28 PROBLEM — G96.198 EPIDURAL MASS: Status: ACTIVE | Noted: 2024-03-04

## 2025-04-28 PROBLEM — M51.379 DEGENERATIVE DISC DISEASE AT L5-S1 LEVEL: Status: ACTIVE | Noted: 2024-03-04

## 2025-04-28 PROBLEM — E06.3 HASHIMOTO'S DISEASE: Status: ACTIVE | Noted: 2021-05-19

## 2025-04-28 PROBLEM — M51.34 THORACIC DEGENERATIVE DISC DISEASE: Status: ACTIVE | Noted: 2024-03-04

## 2025-04-28 PROBLEM — M79.2 NEURALGIA INVOLVING SCALP: Status: ACTIVE | Noted: 2020-12-03

## 2025-04-28 PROBLEM — J45.40 MODERATE PERSISTENT ASTHMA WITHOUT COMPLICATION (CMD): Status: ACTIVE | Noted: 2024-03-04

## 2025-04-28 RX ORDER — CRANBERRY FRUIT EXTRACT 650 MG
CAPSULE ORAL
COMMUNITY

## 2025-04-28 RX ORDER — TRIAMCINOLONE ACETONIDE 1 MG/G
CREAM TOPICAL
COMMUNITY
Start: 2025-03-14

## 2025-04-28 RX ORDER — METHYLPREDNISOLONE 4 MG/1
TABLET ORAL
COMMUNITY
Start: 2025-02-27

## 2025-04-28 ASSESSMENT — VISUAL ACUITY
OD_PH_SC+: -1
METHOD: SNELLEN - LINEAR
OS_SC: 20/25
OD_CC: JAEGER 1
OD_PH_SC: 20/25
OS_CC: JAEGER 1
OS_SC+: +2
OD_SC: 20/40

## 2025-04-28 ASSESSMENT — SLIT LAMP EXAM - LIDS
COMMENTS: DMC
COMMENTS: DMC

## 2025-04-28 ASSESSMENT — CONF VISUAL FIELD
OD_INFERIOR_NASAL_RESTRICTION: 0
OD_NORMAL: 1
OD_SUPERIOR_TEMPORAL_RESTRICTION: 0
OD_SUPERIOR_NASAL_RESTRICTION: 0
OS_NORMAL: 1
OD_INFERIOR_TEMPORAL_RESTRICTION: 0
OS_SUPERIOR_NASAL_RESTRICTION: 0
OS_SUPERIOR_TEMPORAL_RESTRICTION: 0
OS_INFERIOR_NASAL_RESTRICTION: 0
OS_INFERIOR_TEMPORAL_RESTRICTION: 0

## 2025-04-28 ASSESSMENT — REFRACTION_WEARINGRX
OD_SPHERE: +3.25
OS_CYLINDER: SPHERE
OS_SPHERE: +3.25
SPECS_TYPE: NEAR SINGLE VISION
OD_CYLINDER: SPHERE

## 2025-04-28 ASSESSMENT — EXTERNAL EXAM - LEFT EYE: OS_EXAM: NORMAL

## 2025-04-28 ASSESSMENT — TONOMETRY
OD_IOP_MMHG: 15
OS_IOP_MMHG: 15
IOP_METHOD: APPLANATION

## 2025-04-28 ASSESSMENT — EXTERNAL EXAM - RIGHT EYE: OD_EXAM: NORMAL

## 2025-05-13 ENCOUNTER — TELEPHONE (OUTPATIENT)
Dept: OPTOMETRY | Age: 80
End: 2025-05-13

## 2025-05-29 ENCOUNTER — TELEPHONE (OUTPATIENT)
Dept: OPTOMETRY | Age: 80
End: 2025-05-29

## 2025-06-10 ENCOUNTER — OFFICE VISIT (OUTPATIENT)
Dept: NEUROLOGY | Facility: CLINIC | Age: 80
End: 2025-06-10
Payer: MEDICARE

## 2025-06-10 VITALS
SYSTOLIC BLOOD PRESSURE: 118 MMHG | BODY MASS INDEX: 22 KG/M2 | RESPIRATION RATE: 16 BRPM | DIASTOLIC BLOOD PRESSURE: 60 MMHG | HEART RATE: 70 BPM | WEIGHT: 105 LBS

## 2025-06-10 DIAGNOSIS — R20.0 NUMBNESS AND TINGLING OF BOTH LEGS: ICD-10-CM

## 2025-06-10 DIAGNOSIS — R51.9 NONINTRACTABLE HEADACHE, UNSPECIFIED CHRONICITY PATTERN, UNSPECIFIED HEADACHE TYPE: Primary | ICD-10-CM

## 2025-06-10 DIAGNOSIS — R20.2 NUMBNESS AND TINGLING OF BOTH LEGS: ICD-10-CM

## 2025-06-10 DIAGNOSIS — R53.1 WEAKNESS: ICD-10-CM

## 2025-06-10 DIAGNOSIS — H53.2 DIPLOPIA: ICD-10-CM

## 2025-06-10 DIAGNOSIS — R41.3 MEMORY LOSS: ICD-10-CM

## 2025-06-10 PROCEDURE — 1160F RVW MEDS BY RX/DR IN RCRD: CPT | Performed by: OTHER

## 2025-06-10 PROCEDURE — 99215 OFFICE O/P EST HI 40 MIN: CPT | Performed by: OTHER

## 2025-06-10 PROCEDURE — 3078F DIAST BP <80 MM HG: CPT | Performed by: OTHER

## 2025-06-10 PROCEDURE — G2211 COMPLEX E/M VISIT ADD ON: HCPCS | Performed by: OTHER

## 2025-06-10 PROCEDURE — 1159F MED LIST DOCD IN RCRD: CPT | Performed by: OTHER

## 2025-06-10 PROCEDURE — 3074F SYST BP LT 130 MM HG: CPT | Performed by: OTHER

## 2025-06-10 RX ORDER — PREDNISONE 10 MG/1
TABLET ORAL
Qty: 15 TABLET | Refills: 0 | Status: SHIPPED | OUTPATIENT
Start: 2025-06-10

## 2025-06-10 RX ORDER — DIPHENHYDRAMINE HCL 25 MG
TABLET ORAL
Qty: 2 TABLET | Refills: 0 | Status: SHIPPED | OUTPATIENT
Start: 2025-06-10
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
## 2025-06-10 NOTE — PATIENT INSTRUCTIONS
Instructions from Dr. Lang:       Please call to schedule memory testing (neuropsychological testing)    Please call to schedule MRI brain and orbits, and make sure to take the prednisone and benadryl prior to the test due to your allergy history (prescriptions have been sent to your pharmacy)    Please call to schedule EEG (brain-wave test for seizures)    Please have blood tests today      See me in 3 months    ~~~~~~~~~~~~~~~~~~~~~~~     Refill policies:    Allow 2-3 business days for refills; controlled substances may take longer.  Contact your pharmacy at least 5 days prior to running out of medication and have them send an electronic request or submit request through the “request refill” option in your [employer] account.  Refills are not addressed on weekends; covering physicians do not authorize routine medications on weekends.  No narcotics or controlled substances are refilled after noon on Fridays or by on call physicians.  By law, narcotics must be electronically prescribed.  A 30 day supply with no refills is the maximum allowed.  If your prescription is due for a refill, you may be due for a follow up appointment.  To best provide you care, patients receiving routine medications need to be seen at least once a year.  Patients receiving narcotic/controlled substance medications need to be seen at least once every 3 months.  In the event that your preferred pharmacy does not have the requested medication in stock (e.g. Backordered), it is your responsibility to find another pharmacy that has the requested medication available.  We will gladly send a new prescription to that pharmacy at your request.    Scheduling Tests:    If your physician has ordered radiology tests such as MRI or CT scans, please contact Central Scheduling at [phone] right away to schedule the test.  Once scheduled, the [facility] Centralized Referral Team will work with your insurance carrier to obtain pre-certification or prior  authorization.  Depending on your insurance carrier, approval may take 3-10 days.  It is highly recommended patients assure they have received an authorization before having a test performed.  If test is done without insurance authorization, patient may be responsible for the entire amount billed.      Precertification and Prior Authorizations:  If your physician has recommended that you have a procedure or additional testing performed the [facility] Centralized Referral Team will contact your insurance carrier to obtain pre-certification or prior authorization.    You are strongly encouraged to contact your insurance carrier to verify that your procedure/test has been approved and is a COVERED benefit.  Although the [facility] Centralized Referral Team does its due diligence, the insurance carrier gives the disclaimer that \"Although the procedure is authorized, this does not guarantee payment.\"    Ultimately the patient is responsible for payment.   Thank you for your understanding in this matter.  Paperwork Completion:  If you require FMLA or disability paperwork for your recovery, please make sure to either drop it off or have it faxed to our office at [phone]. Be sure the form has your name and date of birth on it.  The form will be faxed to our Forms Department and they will complete it for you.  There is a 25$ fee for all forms that need to be filled out.  Please be aware there is a 10-14 day turnaround time.  You will need to sign a release of information (MARC) form if your paperwork does not come with one.  You may call the Forms Department with any questions at [phone].  Their fax number is [phone].

## 2025-06-20 ENCOUNTER — LAB ENCOUNTER (OUTPATIENT)
Dept: LAB | Age: 80
End: 2025-06-20
Attending: Other
Payer: MEDICARE

## 2025-06-20 DIAGNOSIS — R53.1 WEAKNESS: ICD-10-CM

## 2025-06-20 DIAGNOSIS — H53.2 DIPLOPIA: ICD-10-CM

## 2025-06-20 DIAGNOSIS — R41.3 MEMORY LOSS: ICD-10-CM

## 2025-06-20 LAB
FOLATE SERPL-MCNC: 31.5 NG/ML (ref 5.4–?)
T4 FREE SERPL-MCNC: 1.2 NG/DL (ref 0.8–1.7)
TSI SER-ACNC: 5.07 UIU/ML (ref 0.55–4.78)
VIT B12 SERPL-MCNC: 507 PG/ML (ref 211–911)

## 2025-06-20 PROCEDURE — 84439 ASSAY OF FREE THYROXINE: CPT

## 2025-06-20 PROCEDURE — 82607 VITAMIN B-12: CPT

## 2025-06-20 PROCEDURE — 83519 RIA NONANTIBODY: CPT

## 2025-06-20 PROCEDURE — 84425 ASSAY OF VITAMIN B-1: CPT

## 2025-06-20 PROCEDURE — 36415 COLL VENOUS BLD VENIPUNCTURE: CPT

## 2025-06-20 PROCEDURE — 86381 MITOCHONDRIAL ANTIBODY EACH: CPT

## 2025-06-20 PROCEDURE — 82746 ASSAY OF FOLIC ACID SERUM: CPT

## 2025-06-20 PROCEDURE — 84443 ASSAY THYROID STIM HORMONE: CPT

## 2025-06-25 LAB — VITAMIN B1 WHOLE BLD: 91.6 NMOL/L

## 2025-06-27 ENCOUNTER — TELEPHONE (OUTPATIENT)
Dept: CT IMAGING | Facility: HOSPITAL | Age: 80
End: 2025-06-27

## 2025-06-27 NOTE — TELEPHONE ENCOUNTER
Call to pt left detailed VM with callback number to radiology nurses at [phone]. Pt with listed allergy to CT. Calling to confirm patient has the ordered 13 hour prep meds. Pt instructed to have . Pt instructed to take prednisone 50mg at 0100, 0900, 1300 and benadryl 50mg at 1300. Asked pt to call back to confirm receipt of message, to confirm allergy to CT contrast, to confirm that she has received allergy pre-medications and if she has any additional questions. Awaiting call back.

## 2025-07-02 ENCOUNTER — HOSPITAL ENCOUNTER (OUTPATIENT)
Dept: CT IMAGING | Facility: HOSPITAL | Age: 80
Discharge: HOME | End: 2025-07-02
Attending: NURSE PRACTITIONER
Payer: MEDICARE

## 2025-07-02 DIAGNOSIS — H53.2 DIPLOPIA: ICD-10-CM

## 2025-07-02 LAB
CREAT BLD-MCNC: 0.6 MG/DL (ref 0.55–1.02)
EGFRCR SERPLBLD CKD-EPI 2021: 90 ML/MIN/1.73M2 (ref 60–?)

## 2025-07-02 PROCEDURE — 82565 ASSAY OF CREATININE: CPT

## 2025-07-02 PROCEDURE — 70482 CT ORBIT/EAR/FOSSA W/O&W/DYE: CPT | Performed by: NURSE PRACTITIONER

## 2025-07-07 ENCOUNTER — OFFICE VISIT (OUTPATIENT)
Facility: CLINIC | Age: 80
End: 2025-07-07
Payer: MEDICARE

## 2025-07-07 VITALS
HEART RATE: 73 BPM | OXYGEN SATURATION: 97 % | WEIGHT: 104 LBS | BODY MASS INDEX: 21.83 KG/M2 | HEIGHT: 58 IN | SYSTOLIC BLOOD PRESSURE: 116 MMHG | DIASTOLIC BLOOD PRESSURE: 64 MMHG | RESPIRATION RATE: 16 BRPM

## 2025-07-07 DIAGNOSIS — J45.901 MODERATE ASTHMA WITH ACUTE EXACERBATION, UNSPECIFIED WHETHER PERSISTENT (HCC): Primary | ICD-10-CM

## 2025-07-07 DIAGNOSIS — R06.09 DYSPNEA ON EXERTION: ICD-10-CM

## 2025-07-07 PROCEDURE — 3074F SYST BP LT 130 MM HG: CPT | Performed by: INTERNAL MEDICINE

## 2025-07-07 PROCEDURE — 3008F BODY MASS INDEX DOCD: CPT | Performed by: INTERNAL MEDICINE

## 2025-07-07 PROCEDURE — 1160F RVW MEDS BY RX/DR IN RCRD: CPT | Performed by: INTERNAL MEDICINE

## 2025-07-07 PROCEDURE — 1159F MED LIST DOCD IN RCRD: CPT | Performed by: INTERNAL MEDICINE

## 2025-07-07 PROCEDURE — 99214 OFFICE O/P EST MOD 30 MIN: CPT | Performed by: INTERNAL MEDICINE

## 2025-07-07 PROCEDURE — 3078F DIAST BP <80 MM HG: CPT | Performed by: INTERNAL MEDICINE

## 2025-07-07 RX ORDER — FLUTICASONE PROPIONATE AND SALMETEROL XINAFOATE 115; 21 UG/1; UG/1
2 AEROSOL, METERED RESPIRATORY (INHALATION) 2 TIMES DAILY
Qty: 1 EACH | Refills: 5 | Status: SHIPPED | OUTPATIENT
Start: 2025-07-07

## 2025-07-07 RX ORDER — ALBUTEROL SULFATE 90 UG/1
2 INHALANT RESPIRATORY (INHALATION) EVERY 4 HOURS PRN
Qty: 1 EACH | Refills: 11 | Status: SHIPPED | OUTPATIENT
Start: 2025-07-07
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
## 2025-07-07 NOTE — PATIENT INSTRUCTIONS
Plan- will send of prescription for advair in place of symbicort ---- call if too expensive    - continue rescue inhaler -- albuterol - 2 puffs every 4 hours as needed   -                 - see me  In 4 months       Mackenzie Maher MD  Pulmonary Medicine  7/7/2025

## 2025-07-08 LAB
ACHR BINDING ABS: <0.07 NMOL/L
ACHR BLOCKING ABS: 11 %
ACHR-MODULATING AB: 0 %
MUSK ABS, SERUM: <1 U/ML
STRIATIONAL ABS, SERUM: NEGATIVE

## 2025-07-21 ENCOUNTER — NURSE ONLY (OUTPATIENT)
Dept: ELECTROPHYSIOLOGY | Facility: HOSPITAL | Age: 80
End: 2025-07-21
Attending: Other
Payer: MEDICARE

## 2025-07-21 DIAGNOSIS — R41.3 MEMORY LOSS: ICD-10-CM

## 2025-07-21 PROCEDURE — 95813 EEG EXTND MNTR 61-119 MIN: CPT

## 2025-07-22 ENCOUNTER — HOSPITAL ENCOUNTER (OUTPATIENT)
Dept: MRI IMAGING | Facility: HOSPITAL | Age: 80
Discharge: HOME | End: 2025-07-22
Attending: Other
Payer: MEDICARE

## 2025-07-22 DIAGNOSIS — H53.2 DIPLOPIA: ICD-10-CM

## 2025-07-22 DIAGNOSIS — R51.9 NONINTRACTABLE HEADACHE, UNSPECIFIED CHRONICITY PATTERN, UNSPECIFIED HEADACHE TYPE: ICD-10-CM

## 2025-07-22 DIAGNOSIS — R41.3 MEMORY LOSS: ICD-10-CM

## 2025-07-22 PROCEDURE — 70543 MRI ORBT/FAC/NCK W/O &W/DYE: CPT | Performed by: OTHER

## 2025-07-22 PROCEDURE — 70553 MRI BRAIN STEM W/O & W/DYE: CPT | Performed by: OTHER

## 2025-07-22 PROCEDURE — A9575 INJ GADOTERATE MEGLUMI 0.1ML: HCPCS | Performed by: OTHER

## 2025-07-22 RX ORDER — GADOTERATE MEGLUMINE 376.9 MG/ML
15 INJECTION INTRAVENOUS
Status: COMPLETED | OUTPATIENT
Start: 2025-07-22 | End: 2025-07-22

## 2025-07-22 RX ADMIN — GADOTERATE MEGLUMINE 9 ML: 376.9 INJECTION INTRAVENOUS at 16:10:00

## 2025-08-05 ENCOUNTER — APPOINTMENT (OUTPATIENT)
Dept: OPHTHALMOLOGY | Age: 80
End: 2025-08-05

## 2025-08-18 ENCOUNTER — HOSPITAL ENCOUNTER (OUTPATIENT)
Dept: GENERAL RADIOLOGY | Age: 80
Discharge: HOME | End: 2025-08-18
Attending: NURSE PRACTITIONER

## 2025-08-18 DIAGNOSIS — M54.9 BACK PAIN: ICD-10-CM

## 2025-08-18 DIAGNOSIS — M25.511 RIGHT SHOULDER PAIN: ICD-10-CM

## 2025-08-18 PROCEDURE — 72040 X-RAY EXAM NECK SPINE 2-3 VW: CPT | Performed by: NURSE PRACTITIONER

## 2025-08-18 PROCEDURE — 73030 X-RAY EXAM OF SHOULDER: CPT | Performed by: NURSE PRACTITIONER

## (undated) DIAGNOSIS — R05.9 COUGH: Primary | ICD-10-CM

## (undated) DIAGNOSIS — Z00.00 ENCOUNTER FOR ANNUAL HEALTH EXAMINATION: Primary | ICD-10-CM

## (undated) DIAGNOSIS — R09.81 CONGESTION OF NASAL SINUS: ICD-10-CM

## (undated) DIAGNOSIS — H53.9 VISUAL DISTURBANCE: ICD-10-CM

## (undated) DIAGNOSIS — H93.19 TINNITUS, UNSPECIFIED LATERALITY: Primary | ICD-10-CM

## (undated) DIAGNOSIS — E03.9 ACQUIRED HYPOTHYROIDISM: ICD-10-CM

## (undated) NOTE — Clinical Note
Initial assessment completed with patient.  Appointment scheduled for 09/26/24 for virtual visit, patient tested COVID+ on 09/20/24.  Patient agrees to additional follow-up calls from nurse care manager.  Thank you!

## (undated) NOTE — Clinical Note
TCM call completed. Pt has an appt on 1/24/24. Pt did start C services over the weekend. Pt would like to discuss at her appt the need for Rheumatology and new medications. Thank you.

## (undated) NOTE — Clinical Note
Spoke with pt today for ER f/u graduation call--no readmission for 30 days. Patient has met goals, no further outreach needed. Thank you.  Future Appointments 10/3/2024  2:00 PM    Mackenzie Maher MD         [facility]           [facility] 11/15/2024 10:30 AM   Kathi Sow DO       [id number]          [facility]

## (undated) NOTE — LETTER
8/9/2024              Arianna DE LA PAZ Dixie        [address]        [place] [address]         Dear Arianna,      Medical Grade Compression Hose        Patient: Arianna Colin      YOB: 1945           Diagnosis: Varicose Veins with Pain- Bilateral: I83.813       Compression: 20-30mmHg- Moderate  Style: Thigh-High        Amount: X 2  HCPS: - Thigh High          Physician Signature: _____________________  Date:  08/09/24  Sincerely,    Vivian Rush, LIN

## (undated) NOTE — LETTER
06/18/21        [facility] Vish Colin  [address] [facility]      Dear Deborrah Landau records indicate that you have outstanding lab work and or testing that was ordered for you and has not yet been completed:  Orders Placed T

## (undated) NOTE — LETTER
To: Dr Clifford  Patient Name: Arianna Colin-Age / Sex: 1945-A: 79 y  female   Medical Records: [id number] [facility]: [id number]    Request for History & Physical for Radiology Procedure at [facility]    The above patient is scheduled to have a procedure performed in Radiology.  In order for the procedure to be performed safely, a comprehensive History & Physical, to include the Review of Systems, is required within 30 days of the scheduled appointment.      Procedure:    Date Scheduled:24   Ordered by (Ordering Physician):  Monica    Please FAX the completed H&P to [facility] at [phone].  For Questions: Call [facility] [phone]    If you cannot provide us with a comprehensive History & Physical prior to this appointment, you will need to cancel and reschedule the appointment by calling Central Scheduling [phone].  Thank you.

## (undated) NOTE — LETTER
2/14/2025                       Medical Grade Compression Hose        Patient: Arianna Colin      YOB: 1945           Diagnosis: Cool sensation of bilateral lower extremity R20.9      Compression: 20-30mmHg- Moderate  Style: Thigh-High        Amount: X 2  HCPS: - Thigh High          Physician Signature: Dr. Jamil Oakes                 Date:  02/14/25

## (undated) NOTE — ED AVS SNAPSHOT
Pham Nobles   MRN: [id number]    Department:  [facility] Emergency Department   Date of Visit:  3/20/2020           Disclosure     Insurance plans vary and the physician(s) referred by the ER may not be covered by your plan.  Please contact you tell this physician (or your personal doctor if your instructions are to return to your personal doctor) about any new or lasting problems. The primary care or specialist physician will see patients referred from the [facility] Emergency Department.  Darwin Castro

## (undated) NOTE — LETTER
1/10/2024    Arianna Colin  [address]  [place] [address]    Dear Arianna,    We would like to inform you that your account has been charged $40 for not showing up to the office for your scheduled appointment on 1/10/2024.    Our no-show policy is as follows: A 24-hour notice is required, or you may be charged a $40 No Show fee.      If you are unable to keep your scheduled appointment, please notify us at least 24 hours in advance so we can accommodate our other patients. You may also reschedule your appointment at that time.    On the third no-show, within a 12-month period, it will be the physician’s discretion as to whether a discharge letter will be sent out disengaging you from the practice and giving you 30 days to enroll with a new non [facility] physician.    If you need any assistance in attending your appointments, please call Patient Experience at [phone] so that we may help you identify possible solutions.    Sincerely,  [facility]

## (undated) NOTE — LETTER
06/14/21        Celestino Colin  [address] [facility]      Dear Yamileth Courser records indicate that you have outstanding lab work and or testing that was ordered for you and has not yet been completed:  Orders Placed T

## (undated) NOTE — Clinical Note
MICHAEL Pt completed TCM. Patient has upcoming TCM/HFU appointment scheduled on 6/6/24. Thank you.

## (undated) NOTE — Clinical Note
Micheal Clement! Hope you are doing well! Thank you for referring Ms. Clementina Galvan for rheumatologic evaluation. Please see the discussion portion of my note and let me know if you have any questions.      Rosa Isela Talbot,   [facility]  6/8/2022

## (undated) NOTE — Clinical Note
Spoke with pt today for EARL--please see notes. Patient declines ER f/u appt with you or partners--prefers to f/u with specialists only, at this time.  Sent TE to office staff as FYI/EALR protocol--thank you.

## (undated) NOTE — LETTER
08/04/21        Thaddeus Colin  [address] [facility]      Dear Arnold Chong records indicate that you have outstanding lab work and or testing that was ordered for you and has not yet been completed:  Orders Placed T